# Patient Record
Sex: MALE | Race: WHITE | Employment: FULL TIME | URBAN - METROPOLITAN AREA
[De-identification: names, ages, dates, MRNs, and addresses within clinical notes are randomized per-mention and may not be internally consistent; named-entity substitution may affect disease eponyms.]

---

## 2017-11-24 ENCOUNTER — HOSPITAL ENCOUNTER (EMERGENCY)
Age: 52
Discharge: HOME | End: 2017-11-24
Attending: EMERGENCY MEDICINE
Payer: COMMERCIAL

## 2017-11-24 VITALS
BODY MASS INDEX: 46.65 KG/M2 | WEIGHT: 315 LBS | OXYGEN SATURATION: 97 % | HEIGHT: 69 IN | DIASTOLIC BLOOD PRESSURE: 102 MMHG | HEART RATE: 94 BPM | TEMPERATURE: 98.4 F | RESPIRATION RATE: 14 BRPM | SYSTOLIC BLOOD PRESSURE: 193 MMHG

## 2017-11-24 DIAGNOSIS — M25.472 LEFT ANKLE SWELLING: Primary | ICD-10-CM

## 2017-11-24 PROCEDURE — 99283 EMERGENCY DEPT VISIT LOW MDM: CPT

## 2017-11-24 PROCEDURE — 6370000000 HC RX 637 (ALT 250 FOR IP): Performed by: PHYSICIAN ASSISTANT

## 2017-11-24 RX ORDER — HYDROCODONE BITARTRATE AND ACETAMINOPHEN 5; 325 MG/1; MG/1
2 TABLET ORAL ONCE
Status: COMPLETED | OUTPATIENT
Start: 2017-11-24 | End: 2017-11-24

## 2017-11-24 RX ORDER — HYDROCODONE BITARTRATE AND ACETAMINOPHEN 5; 325 MG/1; MG/1
1 TABLET ORAL EVERY 6 HOURS PRN
Qty: 10 TABLET | Refills: 0 | Status: SHIPPED | OUTPATIENT
Start: 2017-11-24 | End: 2017-11-24

## 2017-11-24 RX ORDER — PREDNISONE 20 MG/1
60 TABLET ORAL ONCE
Status: COMPLETED | OUTPATIENT
Start: 2017-11-24 | End: 2017-11-24

## 2017-11-24 RX ORDER — HYDROCODONE BITARTRATE AND ACETAMINOPHEN 5; 325 MG/1; MG/1
1 TABLET ORAL EVERY 6 HOURS PRN
Qty: 20 TABLET | Refills: 0 | Status: SHIPPED | OUTPATIENT
Start: 2017-11-24 | End: 2017-12-01

## 2017-11-24 RX ORDER — IBUPROFEN 200 MG
200 TABLET ORAL EVERY 6 HOURS PRN
Status: ON HOLD | COMMUNITY
End: 2019-03-11 | Stop reason: HOSPADM

## 2017-11-24 RX ORDER — INDOMETHACIN 50 MG/1
50 CAPSULE ORAL 2 TIMES DAILY WITH MEALS
Qty: 14 CAPSULE | Refills: 0 | Status: SHIPPED | OUTPATIENT
Start: 2017-11-24 | End: 2017-11-24 | Stop reason: CLARIF

## 2017-11-24 RX ADMIN — HYDROCODONE BITARTRATE AND ACETAMINOPHEN 2 TABLET: 5; 325 TABLET ORAL at 12:20

## 2017-11-24 RX ADMIN — PREDNISONE 60 MG: 20 TABLET ORAL at 12:20

## 2017-11-24 ASSESSMENT — PAIN DESCRIPTION - FREQUENCY: FREQUENCY: CONTINUOUS

## 2017-11-24 ASSESSMENT — PAIN DESCRIPTION - LOCATION: LOCATION: FOOT

## 2017-11-24 ASSESSMENT — PAIN DESCRIPTION - ORIENTATION: ORIENTATION: LEFT

## 2017-11-24 ASSESSMENT — PAIN DESCRIPTION - DESCRIPTORS: DESCRIPTORS: ACHING

## 2017-11-24 ASSESSMENT — PAIN SCALES - GENERAL
PAINLEVEL_OUTOF10: 10
PAINLEVEL_OUTOF10: 10

## 2017-11-24 NOTE — ED PROVIDER NOTES
[facility] ED  Emergency Department  Independent Attestation     Pt Name: Mi Barney  MRN: [id number]  [place] 1965  Date of evaluation: 11/24/17       Mi Barney is a 54 y.o. male who presents with No chief complaint on file. I was personally available for consultation in the Emergency Department. I have reviewed the chart and agree with the documentation as recorded by the [facility] AND [facility], including the assessment, treatment plan and disposition.     Gemma Parry DO  Attending Emergency Physician  [facility] ED      (Please note that portions of this note were completed with a voice recognition program.  Efforts were made to edit the dictations but occasionally words are mis-transcribed.)        Gemma Parry DO  11/24/17 1200

## 2017-11-24 NOTE — ED PROVIDER NOTES
[facility]  eMERGENCY dEPARTMENT eNCOUnter      Pt Name: Corina Gomes  MRN: [id number]  [place] 1965  Date of evaluation: 11/24/2017  Provider: Benita Sellers 128 COMPLAINT       Chief Complaint   Patient presents with    Foot Pain           HISTORY OF PRESENT ILLNESS  (Location/Symptom, Timing/Onset, Context/Setting, Quality, Duration, Modifying Factors, Severity.)   Corina Gomes is a 54 y.o. male who presents to the emergency department Complaining of left ankle pain for the past 2 days. Patient has a history of gout feels similar previous. Denies any injury, states that he just woke up with left ankle pain without any injury     Quality: aching  Duration: constant  Modifying Factors: worse with walking, better with rest and elevation  Severity: mild-moderate    Nursing Notes were reviewed. REVIEW OF SYSTEMS    (2-9 systems for level 4, 10 or more for level 5)     Review of Systems   Constitutional: Negative. Musculoskeletal: left ankle pain. Except as noted above the remainder of the review of systems was reviewed and negative.        PAST MEDICAL HISTORY         Diagnosis Date    Red's esophagus 15 years ago     gets scoped every 2-3 years    GERD (gastroesophageal reflux disease)     Gout     Hyperlipidemia     Hypertension     Morbid obesity ([facility] [address]     KVNG (obstructive sleep apnea)      None otherwise stated in nurses note    SURGICAL HISTORY           Procedure Laterality Date    ENDOSCOPY, COLON, DIAGNOSTIC      mulitple EGDs    HERNIA REPAIR  1982    hiatal hernia repair    TONSILLECTOMY       None otherwise stated in nurses note    CURRENT MEDICATIONS       Current Discharge Medication List      CONTINUE these medications which have NOT CHANGED    Details   ibuprofen (ADVIL;MOTRIN) 200 MG tablet Take 200 mg by mouth every 6 hours as needed for Pain      lansoprazole (PREVACID) 30 MG capsule Take 30 mg by mouth daily      hydrALAZINE (APRESOLINE) 25 MG tablet Take 1 tablet by mouth every 8 hours  Qty: 90 tablet, Refills: 0      diphenhydrAMINE (BENADRYL) 25 MG tablet Take 25 mg by mouth every 6 hours as needed for Itching             ALLERGIES     Lisinopril    FAMILY HISTORY           Problem Relation Age of Onset    Cancer Mother     Heart Disease Father     High Blood Pressure Father     Other Brother      parkinsons    Cancer Maternal Uncle     Diabetes Paternal Cousin      Family Status   Relation Status    Mother     Father     Brother     Maternal Uncle     Paternal Cousin       None otherwise stated in nurses note    SOCIAL HISTORY      reports that he has been smoking Cigars. He has never used smokeless tobacco. He reports that he does not drink alcohol or use drugs. Lives at home with others    PHYSICAL EXAM    (up to 7 for level 4, 8 or more for level 5)     ED Triage Vitals [11/24/17 1154]   BP Temp Temp Source Pulse Resp SpO2 Height Weight   (!) 181/97 98.4 °F (36.9 °C) Oral 98 14 97 % 5' 9\" (1.753 m) (!) 360 lb (163.3 kg)       Physical Exam   Nursing note and vitals reviewed. Constitutional: Oriented to person, place, and time and well-developed, well-nourished, and in no distress. Head: Normocephalic and atraumatic. Ear: External ears normal.   Nose: Nose normal and midline. Eyes: Conjunctivae and EOM are normal. Pupils are equal, round, and reactive to light. Cardiovascular: Normal rate, regular rhythm, normal heart sounds and intact distal pulses. Pulmonary/Chest: Effort normal and breath sounds normal. No respiratory distress. No wheezes. No rales. No chest tenderness. Musculoskeletal: Tender palpation lateral left ankle, range of motion is painful, slightly warm to touch. No rashes, no obvious deformity dislocations present. Neurological: Alert and oriented to person, place, and time. GCS score is 15. Skin: Skin is warm and dry. No rash noted. No erythema. No pallor.              DIAGNOSTIC RESULTS     RADIOLOGY:   All plain film, CT, MRI, and formal ultrasound images (except ED bedside ultrasound) are read by the radiologist and the images and interpretations are directly viewed by the emergency physician. No orders to display           No results found. LABS:  Labs Reviewed - No data to display    All other labs were within normal range or not returned as of this dictation. EMERGENCY DEPARTMENT COURSE and DIFFERENTIAL DIAGNOSIS/MDM:   Vitals:    Vitals:    11/24/17 1154 11/24/17 1217 11/24/17 1243   BP: (!) 181/97  (!) 193/102   Pulse: 98 92 94   Resp: 14     Temp: 98.4 °F (36.9 °C)     TempSrc: Oral     SpO2: 97%  97%   Weight: (!) 360 lb (163.3 kg)     Height: 5' 9\" (1.753 m)         Pre-hypertension/Hypertension: The patient has been informed that they may have pre-hypertension or Hypertension based on a blood pressure reading in the emergency department. I recommend that the patient call the primary care provider listed on their discharge instructions or a physician of their choice this week to arrange follow up for further evaluation of possible pre-hypertension or Hypertension    Patient hasn't had his blood pressure medication over a year, instructed him that he needs to see his doctor, will avoid prescribing any NSAIDs    Patient instructed to return to the emergency room if symptoms worsen, return, or any other concern right away which is agreed. MEDS  Orders Placed This Encounter   Medications    predniSONE (DELTASONE) tablet 60 mg    HYDROcodone-acetaminophen (NORCO) 5-325 MG per tablet 2 tablet    DISCONTD: indomethacin (INDOCIN) 50 MG capsule     Sig: Take 1 capsule by mouth 2 times daily (with meals)     Dispense:  14 capsule     Refill:  0    DISCONTD: HYDROcodone-acetaminophen (NORCO) 5-325 MG per tablet     Sig: Take 1 tablet by mouth every 6 hours as needed for Pain .      Dispense:  10 tablet     Refill:  0    HYDROcodone-acetaminophen (NORCO) 5-325 MG per tablet Sig: Take 1 tablet by mouth every 6 hours as needed for Pain . Dispense:  20 tablet     Refill:  0         CONSULTS:  None    PROCEDURES:  None        FINAL IMPRESSION      1. Left ankle swelling          DISPOSITION/PLAN   DISPOSITION Decision To Discharge    PATIENT REFERRED TO:  Mikki Win, [address] [place] [address] [address] [employer] [address] [facility]  [phone]    Schedule an appointment as soon as possible for a visit       [facility] ED  [address]  [address] [place] [address]  [phone]    For worsening symptoms, or any other concern      DISCHARGE MEDICATIONS:  Current Discharge Medication List      START taking these medications    Details   HYDROcodone-acetaminophen (NORCO) 5-325 MG per tablet Take 1 tablet by mouth every 6 hours as needed for Pain .   Qty: 20 tablet, Refills: 0             (Please note that portions of this note were completed with a voice recognition program.  Efforts were made to edit the dictations but occasionally words are mis-transcribed.)    TERE Reddy PA  11/24/17 1300

## 2018-01-04 ENCOUNTER — HOSPITAL ENCOUNTER (INPATIENT)
Age: 53
LOS: 5 days | Discharge: HOME | DRG: 554 | End: 2018-01-09
Attending: INTERNAL MEDICINE | Admitting: INTERNAL MEDICINE
Payer: COMMERCIAL

## 2018-01-04 ENCOUNTER — OFFICE VISIT (OUTPATIENT)
Dept: INTERNAL MEDICINE CLINIC | Age: 53
End: 2018-01-04
Payer: COMMERCIAL

## 2018-01-04 VITALS — RESPIRATION RATE: 18 BRPM | HEIGHT: 69 IN | HEART RATE: 86 BPM

## 2018-01-04 DIAGNOSIS — E66.01 MORBIDLY OBESE (HCC): ICD-10-CM

## 2018-01-04 DIAGNOSIS — M25.572 ACUTE LEFT ANKLE PAIN: Primary | ICD-10-CM

## 2018-01-04 DIAGNOSIS — I10 ESSENTIAL HYPERTENSION: ICD-10-CM

## 2018-01-04 PROBLEM — M00.9 SEPTIC ARTHRITIS OF ANKLE (HCC): Status: ACTIVE | Noted: 2018-01-04

## 2018-01-04 LAB
ABSOLUTE EOS #: 0.3 K/UL (ref 0–0.4)
ABSOLUTE IMMATURE GRANULOCYTE: ABNORMAL K/UL (ref 0–0.3)
ABSOLUTE LYMPH #: 2.4 K/UL (ref 1–4.8)
ABSOLUTE MONO #: 1 K/UL (ref 0.1–1.3)
ANION GAP SERPL CALCULATED.3IONS-SCNC: 12 MMOL/L (ref 9–17)
BASOPHILS # BLD: 1 % (ref 0–2)
BASOPHILS ABSOLUTE: 0.1 K/UL (ref 0–0.2)
BUN BLDV-MCNC: 14 MG/DL (ref 6–20)
BUN/CREAT BLD: ABNORMAL (ref 9–20)
C-REACTIVE PROTEIN: 14.1 MG/L (ref 0–5)
CALCIUM SERPL-MCNC: 9.1 MG/DL (ref 8.6–10.4)
CHLORIDE BLD-SCNC: 101 MMOL/L (ref 98–107)
CO2: 25 MMOL/L (ref 20–31)
CREAT SERPL-MCNC: 1.03 MG/DL (ref 0.7–1.2)
CREAT SERPL-MCNC: 1.05 MG/DL (ref 0.7–1.2)
DIFFERENTIAL TYPE: ABNORMAL
EOSINOPHILS RELATIVE PERCENT: 3 % (ref 0–4)
GFR AFRICAN AMERICAN: >60 ML/MIN
GFR AFRICAN AMERICAN: >60 ML/MIN
GFR NON-AFRICAN AMERICAN: >60 ML/MIN
GFR NON-AFRICAN AMERICAN: >60 ML/MIN
GFR SERPL CREATININE-BSD FRML MDRD: ABNORMAL ML/MIN/{1.73_M2}
GFR SERPL CREATININE-BSD FRML MDRD: ABNORMAL ML/MIN/{1.73_M2}
GFR SERPL CREATININE-BSD FRML MDRD: NORMAL ML/MIN/{1.73_M2}
GFR SERPL CREATININE-BSD FRML MDRD: NORMAL ML/MIN/{1.73_M2}
GLUCOSE BLD-MCNC: 148 MG/DL (ref 70–99)
HCT VFR BLD CALC: 41.1 % (ref 41–53)
HEMOGLOBIN: 13.7 G/DL (ref 13.5–17.5)
IMMATURE GRANULOCYTES: ABNORMAL %
LYMPHOCYTES # BLD: 21 % (ref 24–44)
MCH RBC QN AUTO: 27.8 PG (ref 26–34)
MCHC RBC AUTO-ENTMCNC: 33.5 G/DL (ref 31–37)
MCV RBC AUTO: 83.1 FL (ref 80–100)
MONOCYTES # BLD: 9 % (ref 1–7)
PDW BLD-RTO: 13.2 % (ref 11.5–14.9)
PLATELET # BLD: 248 K/UL (ref 150–450)
PLATELET ESTIMATE: ABNORMAL
PMV BLD AUTO: 8.2 FL (ref 6–12)
POTASSIUM SERPL-SCNC: 4.1 MMOL/L (ref 3.7–5.3)
RBC # BLD: 4.95 M/UL (ref 4.5–5.9)
RBC # BLD: ABNORMAL 10*6/UL
SEDIMENTATION RATE, ERYTHROCYTE: 22 MM (ref 0–15)
SEG NEUTROPHILS: 66 % (ref 36–66)
SEGMENTED NEUTROPHILS ABSOLUTE COUNT: 7.3 K/UL (ref 1.3–9.1)
SODIUM BLD-SCNC: 138 MMOL/L (ref 135–144)
URIC ACID: 6.8 MG/DL (ref 3.4–7)
WBC # BLD: 11.1 K/UL (ref 3.5–11)
WBC # BLD: ABNORMAL 10*3/UL

## 2018-01-04 PROCEDURE — 4004F PT TOBACCO SCREEN RCVD TLK: CPT | Performed by: INTERNAL MEDICINE

## 2018-01-04 PROCEDURE — 99214 OFFICE O/P EST MOD 30 MIN: CPT | Performed by: INTERNAL MEDICINE

## 2018-01-04 PROCEDURE — G8417 CALC BMI ABV UP PARAM F/U: HCPCS | Performed by: INTERNAL MEDICINE

## 2018-01-04 PROCEDURE — 1200000000 HC SEMI PRIVATE

## 2018-01-04 PROCEDURE — 36415 COLL VENOUS BLD VENIPUNCTURE: CPT

## 2018-01-04 PROCEDURE — 2580000003 HC RX 258: Performed by: INTERNAL MEDICINE

## 2018-01-04 PROCEDURE — 2580000003 HC RX 258: Performed by: FAMILY MEDICINE

## 2018-01-04 PROCEDURE — 3017F COLORECTAL CA SCREEN DOC REV: CPT | Performed by: INTERNAL MEDICINE

## 2018-01-04 PROCEDURE — 80048 BASIC METABOLIC PNL TOTAL CA: CPT

## 2018-01-04 PROCEDURE — G8484 FLU IMMUNIZE NO ADMIN: HCPCS | Performed by: INTERNAL MEDICINE

## 2018-01-04 PROCEDURE — 85651 RBC SED RATE NONAUTOMATED: CPT

## 2018-01-04 PROCEDURE — 82565 ASSAY OF CREATININE: CPT

## 2018-01-04 PROCEDURE — 84550 ASSAY OF BLOOD/URIC ACID: CPT

## 2018-01-04 PROCEDURE — 85025 COMPLETE CBC W/AUTO DIFF WBC: CPT

## 2018-01-04 PROCEDURE — 6370000000 HC RX 637 (ALT 250 FOR IP): Performed by: FAMILY MEDICINE

## 2018-01-04 PROCEDURE — 94660 CPAP INITIATION&MGMT: CPT

## 2018-01-04 PROCEDURE — 83036 HEMOGLOBIN GLYCOSYLATED A1C: CPT

## 2018-01-04 PROCEDURE — G8427 DOCREV CUR MEDS BY ELIG CLIN: HCPCS | Performed by: INTERNAL MEDICINE

## 2018-01-04 PROCEDURE — 86140 C-REACTIVE PROTEIN: CPT

## 2018-01-04 PROCEDURE — 6360000002 HC RX W HCPCS: Performed by: FAMILY MEDICINE

## 2018-01-04 PROCEDURE — 6360000002 HC RX W HCPCS: Performed by: INTERNAL MEDICINE

## 2018-01-04 PROCEDURE — 6370000000 HC RX 637 (ALT 250 FOR IP): Performed by: INTERNAL MEDICINE

## 2018-01-04 RX ORDER — LORAZEPAM 2 MG/ML
1 INJECTION INTRAMUSCULAR
Status: ACTIVE | OUTPATIENT
Start: 2018-01-04 | End: 2018-01-04

## 2018-01-04 RX ORDER — IBUPROFEN 400 MG/1
200 TABLET ORAL EVERY 6 HOURS PRN
Status: DISCONTINUED | OUTPATIENT
Start: 2018-01-04 | End: 2018-01-05

## 2018-01-04 RX ORDER — POTASSIUM CHLORIDE 20 MEQ/1
40 TABLET, EXTENDED RELEASE ORAL PRN
Status: DISCONTINUED | OUTPATIENT
Start: 2018-01-04 | End: 2018-01-09 | Stop reason: HOSPADM

## 2018-01-04 RX ORDER — DOCUSATE SODIUM 100 MG/1
100 CAPSULE, LIQUID FILLED ORAL 2 TIMES DAILY
Status: DISCONTINUED | OUTPATIENT
Start: 2018-01-04 | End: 2018-01-09 | Stop reason: HOSPADM

## 2018-01-04 RX ORDER — POTASSIUM CHLORIDE 7.45 MG/ML
10 INJECTION INTRAVENOUS PRN
Status: DISCONTINUED | OUTPATIENT
Start: 2018-01-04 | End: 2018-01-09 | Stop reason: HOSPADM

## 2018-01-04 RX ORDER — ONDANSETRON 2 MG/ML
4 INJECTION INTRAMUSCULAR; INTRAVENOUS EVERY 6 HOURS PRN
Status: DISCONTINUED | OUTPATIENT
Start: 2018-01-04 | End: 2018-01-09 | Stop reason: HOSPADM

## 2018-01-04 RX ORDER — ACETAMINOPHEN 325 MG/1
650 TABLET ORAL EVERY 4 HOURS PRN
Status: DISCONTINUED | OUTPATIENT
Start: 2018-01-04 | End: 2018-01-09 | Stop reason: HOSPADM

## 2018-01-04 RX ORDER — FAMOTIDINE 20 MG/1
20 TABLET, FILM COATED ORAL 2 TIMES DAILY
Status: DISCONTINUED | OUTPATIENT
Start: 2018-01-04 | End: 2018-01-07 | Stop reason: CLARIF

## 2018-01-04 RX ORDER — HYDRALAZINE HYDROCHLORIDE 25 MG/1
25 TABLET, FILM COATED ORAL EVERY 8 HOURS SCHEDULED
Status: DISCONTINUED | OUTPATIENT
Start: 2018-01-04 | End: 2018-01-04 | Stop reason: SDUPTHER

## 2018-01-04 RX ORDER — SODIUM CHLORIDE 0.9 % (FLUSH) 0.9 %
10 SYRINGE (ML) INJECTION PRN
Status: DISCONTINUED | OUTPATIENT
Start: 2018-01-04 | End: 2018-01-09 | Stop reason: HOSPADM

## 2018-01-04 RX ORDER — HYDRALAZINE HYDROCHLORIDE 25 MG/1
25 TABLET, FILM COATED ORAL EVERY 8 HOURS SCHEDULED
Status: DISCONTINUED | OUTPATIENT
Start: 2018-01-04 | End: 2018-01-09 | Stop reason: HOSPADM

## 2018-01-04 RX ORDER — SODIUM CHLORIDE 0.9 % (FLUSH) 0.9 %
10 SYRINGE (ML) INJECTION EVERY 12 HOURS SCHEDULED
Status: DISCONTINUED | OUTPATIENT
Start: 2018-01-04 | End: 2018-01-09 | Stop reason: HOSPADM

## 2018-01-04 RX ORDER — HYDROCODONE BITARTRATE AND ACETAMINOPHEN 5; 325 MG/1; MG/1
1 TABLET ORAL EVERY 6 HOURS PRN
Status: DISCONTINUED | OUTPATIENT
Start: 2018-01-04 | End: 2018-01-06

## 2018-01-04 RX ORDER — POTASSIUM CHLORIDE 20MEQ/15ML
40 LIQUID (ML) ORAL PRN
Status: DISCONTINUED | OUTPATIENT
Start: 2018-01-04 | End: 2018-01-09 | Stop reason: HOSPADM

## 2018-01-04 RX ORDER — OXYCODONE HYDROCHLORIDE AND ACETAMINOPHEN 5; 325 MG/1; MG/1
1 TABLET ORAL EVERY 4 HOURS PRN
Status: DISCONTINUED | OUTPATIENT
Start: 2018-01-04 | End: 2018-01-09 | Stop reason: HOSPADM

## 2018-01-04 RX ADMIN — FAMOTIDINE 20 MG: 20 TABLET ORAL at 20:16

## 2018-01-04 RX ADMIN — OXYCODONE HYDROCHLORIDE AND ACETAMINOPHEN 1 TABLET: 5; 325 TABLET ORAL at 20:15

## 2018-01-04 RX ADMIN — ENOXAPARIN SODIUM 40 MG: 40 INJECTION SUBCUTANEOUS at 20:16

## 2018-01-04 RX ADMIN — VANCOMYCIN HYDROCHLORIDE 1500 MG: 1 INJECTION, POWDER, LYOPHILIZED, FOR SOLUTION INTRAVENOUS at 20:22

## 2018-01-04 RX ADMIN — Medication 10 ML: at 20:16

## 2018-01-04 RX ADMIN — DOCUSATE SODIUM 100 MG: 100 CAPSULE, LIQUID FILLED ORAL at 20:16

## 2018-01-04 RX ADMIN — HYDRALAZINE HYDROCHLORIDE 25 MG: 25 TABLET, FILM COATED ORAL at 18:13

## 2018-01-04 RX ADMIN — HYDROCODONE BITARTRATE AND ACETAMINOPHEN 1 TABLET: 5; 325 TABLET ORAL at 17:44

## 2018-01-04 ASSESSMENT — PAIN SCALES - GENERAL
PAINLEVEL_OUTOF10: 10
PAINLEVEL_OUTOF10: 7
PAINLEVEL_OUTOF10: 10
PAINLEVEL_OUTOF10: 10

## 2018-01-04 ASSESSMENT — PAIN DESCRIPTION - PAIN TYPE
TYPE: ACUTE PAIN
TYPE: ACUTE PAIN

## 2018-01-04 ASSESSMENT — ENCOUNTER SYMPTOMS
EYE PAIN: 0
DIARRHEA: 0
COUGH: 0
WHEEZING: 0
ABDOMINAL DISTENTION: 0
DIARRHEA: 0
COLOR CHANGE: 0
SHORTNESS OF BREATH: 0
COUGH: 0
NAUSEA: 0
EYE DISCHARGE: 0
VOMITING: 0
SHORTNESS OF BREATH: 0
BLOOD IN STOOL: 0
ABDOMINAL PAIN: 0
TROUBLE SWALLOWING: 0
HEARTBURN: 1

## 2018-01-04 ASSESSMENT — PATIENT HEALTH QUESTIONNAIRE - PHQ9
SUM OF ALL RESPONSES TO PHQ QUESTIONS 1-9: 0
2. FEELING DOWN, DEPRESSED OR HOPELESS: 0
1. LITTLE INTEREST OR PLEASURE IN DOING THINGS: 0
SUM OF ALL RESPONSES TO PHQ9 QUESTIONS 1 & 2: 0

## 2018-01-04 ASSESSMENT — PAIN DESCRIPTION - ORIENTATION
ORIENTATION: LEFT
ORIENTATION: LEFT

## 2018-01-04 ASSESSMENT — PAIN DESCRIPTION - LOCATION
LOCATION: FOOT
LOCATION: ANKLE

## 2018-01-04 ASSESSMENT — PAIN DESCRIPTION - DESCRIPTORS: DESCRIPTORS: THROBBING;PINS AND NEEDLES

## 2018-01-04 NOTE — PROGRESS NOTES
ADMISSION NOTE       Patient admitted to room  2037. Time of admit:  [address] from:  Direct admit from Dr. Royce Deras    Reason for admission:  Septic arthritis? Where patient has been residing for the last 24 hrs:  Private residence     Has the patient been admitted to any facility in the last 4 weeks, which one:  no    Family at bedside:  no    Patient is currently resting in bed, vitals obtained, /110, bedside RN notified. no distress noted. Patient has been oriented to room, educated on how to use call light, and to call for assistance prior to getting up. Bed in lowest and locked position. 2 siderails up for safety. Call light within reach.

## 2018-01-04 NOTE — PROGRESS NOTES
for polydipsia and polyphagia. Genitourinary: Negative for difficulty urinating and frequency. Musculoskeletal: Positive for arthralgias. Negative for gait problem, myalgias and neck pain. Skin: Negative for color change and rash. Allergic/Immunologic: Negative for environmental allergies and food allergies. Neurological: Negative for dizziness and headaches. Hematological: Negative for adenopathy. Does not bruise/bleed easily. Psychiatric/Behavioral: Negative for behavioral problems and sleep disturbance. I have personally reviewed and agree with the patient Keri PAIGE Zackary is a 50 y.o. male who presents today for follow up on his chronic medical conditions as noted below. Venetta Plume is c/o of   Chief Complaint   Patient presents with    Ankle Pain     Left ankle pain. NKI to the area. pt did [facility] ER 2-3 weeks ago for this. No meds given.         Patient Active Problem List:     Morbidly obese (HCC)     GERD (gastroesophageal reflux disease)     HLD (hyperlipidemia)     HTN (hypertension)     KVNG (obstructive sleep apnea)     Anaphylactic reaction     Angioedema     Tongue swelling     Past Medical History:   Diagnosis Date    Red's esophagus 15 years ago     gets scoped every 2-3 years    GERD (gastroesophageal reflux disease)     Gout     Hyperlipidemia     Hypertension     Morbid obesity ([address]     KVNG (obstructive sleep apnea)       Past Surgical History:   Procedure Laterality Date    ENDOSCOPY, COLON, DIAGNOSTIC      mulitple EGDs    HERNIA REPAIR  1982    hiatal hernia repair    TONSILLECTOMY       Family History   Problem Relation Age of Onset    Cancer Mother     Heart Disease Father     High Blood Pressure Father     Other Brother      parkinsons    Cancer Maternal Uncle     Diabetes Paternal Cousin      Current Outpatient Prescriptions   Medication Sig Dispense Refill    lansoprazole (PREVACID) 30 MG capsule Take 30 mg by Right eye exhibits normal extraocular motion. Left eye exhibits normal extraocular motion. Neck: Normal range of motion. Neck supple. No JVD present. No edema and no erythema present. No thyroid mass and no thyromegaly present. Cardiovascular: Normal rate and regular rhythm. Exam reveals no friction rub. No murmur heard. Pulmonary/Chest: Effort normal and breath sounds normal. No accessory muscle usage. No tachypnea and no bradypnea. No respiratory distress. He has no wheezes. He has no rales. Abdominal: Soft. Bowel sounds are normal. He exhibits no distension. There is no tenderness. There is no rebound. Musculoskeletal: Normal range of motion. He exhibits no edema or tenderness. Left ankle pain swollen warm  Unable tobear wt on it     Lymphadenopathy:        Head (right side): No submental and no submandibular adenopathy present. Head (left side): No submental and no submandibular adenopathy present. He has no cervical adenopathy. Neurological: He is alert and oriented to person, place, and time. He displays no atrophy. No cranial nerve deficit or sensory deficit. He exhibits normal muscle tone. Coordination normal.   Skin: Skin is warm. No bruising, no ecchymosis and no rash noted. He is not diaphoretic. No pallor. Psychiatric: He has a normal mood and affect. His behavior is normal. His mood appears not anxious. His affect is not angry. His speech is not slurred. He is not aggressive. Cognition and memory are not impaired. He expresses no homicidal ideation. 1. Acute left ankle pain     2. Morbidly obese ([address]     3. Essential hypertension         Assessment / Plan:       No Follow-up on file. No orders of the defined types were placed in this encounter. No orders of the defined types were placed in this encounter.   Ankle pain left sever for two weeks  aggravted with walking  Ana with rest  No fever  No chills    gout vs sepitc ankle    Admit to [facility] for iv abx  Mri

## 2018-01-05 ENCOUNTER — APPOINTMENT (OUTPATIENT)
Dept: MRI IMAGING | Age: 53
DRG: 554 | End: 2018-01-05
Attending: INTERNAL MEDICINE
Payer: COMMERCIAL

## 2018-01-05 ENCOUNTER — APPOINTMENT (OUTPATIENT)
Dept: INTERVENTIONAL RADIOLOGY/VASCULAR | Age: 53
DRG: 554 | End: 2018-01-05
Attending: INTERNAL MEDICINE
Payer: COMMERCIAL

## 2018-01-05 LAB
ABSOLUTE EOS #: 0.4 K/UL (ref 0–0.4)
ABSOLUTE IMMATURE GRANULOCYTE: ABNORMAL K/UL (ref 0–0.3)
ABSOLUTE LYMPH #: 1.9 K/UL (ref 1–4.8)
ABSOLUTE MONO #: 0.7 K/UL (ref 0.1–1.3)
ANION GAP SERPL CALCULATED.3IONS-SCNC: 12 MMOL/L (ref 9–17)
BASOPHILS # BLD: 1 % (ref 0–2)
BASOPHILS ABSOLUTE: 0.1 K/UL (ref 0–0.2)
BUN BLDV-MCNC: 13 MG/DL (ref 6–20)
BUN/CREAT BLD: ABNORMAL (ref 9–20)
CALCIUM SERPL-MCNC: 8.6 MG/DL (ref 8.6–10.4)
CHLORIDE BLD-SCNC: 99 MMOL/L (ref 98–107)
CHOLESTEROL/HDL RATIO: 5.9
CHOLESTEROL: 177 MG/DL
CO2: 26 MMOL/L (ref 20–31)
CREAT SERPL-MCNC: 0.93 MG/DL (ref 0.7–1.2)
CULTURE: NORMAL
DIFFERENTIAL TYPE: ABNORMAL
DIRECT EXAM: NORMAL
EOSINOPHILS RELATIVE PERCENT: 4 % (ref 0–4)
ESTIMATED AVERAGE GLUCOSE: 128 MG/DL
GFR AFRICAN AMERICAN: >60 ML/MIN
GFR NON-AFRICAN AMERICAN: >60 ML/MIN
GFR SERPL CREATININE-BSD FRML MDRD: ABNORMAL ML/MIN/{1.73_M2}
GFR SERPL CREATININE-BSD FRML MDRD: ABNORMAL ML/MIN/{1.73_M2}
GLUCOSE BLD-MCNC: 171 MG/DL (ref 70–99)
HBA1C MFR BLD: 6.1 % (ref 4–6)
HCT VFR BLD CALC: 43 % (ref 41–53)
HDLC SERPL-MCNC: 30 MG/DL
HEMOGLOBIN: 14.3 G/DL (ref 13.5–17.5)
IMMATURE GRANULOCYTES: ABNORMAL %
LDL CHOLESTEROL: 108 MG/DL (ref 0–130)
LYMPHOCYTES # BLD: 23 % (ref 24–44)
Lab: NORMAL
MCH RBC QN AUTO: 28.1 PG (ref 26–34)
MCHC RBC AUTO-ENTMCNC: 33.3 G/DL (ref 31–37)
MCV RBC AUTO: 84.4 FL (ref 80–100)
MONOCYTES # BLD: 8 % (ref 1–7)
PDW BLD-RTO: 13.1 % (ref 11.5–14.9)
PLATELET # BLD: 245 K/UL (ref 150–450)
PLATELET ESTIMATE: ABNORMAL
PMV BLD AUTO: 8.1 FL (ref 6–12)
POTASSIUM SERPL-SCNC: 3.7 MMOL/L (ref 3.7–5.3)
RBC # BLD: 5.09 M/UL (ref 4.5–5.9)
RBC # BLD: ABNORMAL 10*6/UL
SEG NEUTROPHILS: 64 % (ref 36–66)
SEGMENTED NEUTROPHILS ABSOLUTE COUNT: 5.4 K/UL (ref 1.3–9.1)
SODIUM BLD-SCNC: 137 MMOL/L (ref 135–144)
SPECIMEN DESCRIPTION: NORMAL
SPECIMEN DESCRIPTION: NORMAL
STATUS: NORMAL
TRIGL SERPL-MCNC: 193 MG/DL
VANCOMYCIN TROUGH DATE LAST DOSE: NORMAL
VANCOMYCIN TROUGH DOSE AMOUNT: 1500
VANCOMYCIN TROUGH TIME LAST DOSE: 1237
VANCOMYCIN TROUGH: 14.3 UG/ML (ref 10–20)
VLDLC SERPL CALC-MCNC: ABNORMAL MG/DL (ref 1–30)
WBC # BLD: 8.3 K/UL (ref 3.5–11)
WBC # BLD: ABNORMAL 10*3/UL

## 2018-01-05 PROCEDURE — 87075 CULTR BACTERIA EXCEPT BLOOD: CPT

## 2018-01-05 PROCEDURE — 0Y9L30Z DRAINAGE OF LEFT ANKLE REGION WITH DRAINAGE DEVICE, PERCUTANEOUS APPROACH: ICD-10-PCS | Performed by: RADIOLOGY

## 2018-01-05 PROCEDURE — 73723 MRI JOINT LWR EXTR W/O&W/DYE: CPT

## 2018-01-05 PROCEDURE — 6360000002 HC RX W HCPCS: Performed by: FAMILY MEDICINE

## 2018-01-05 PROCEDURE — 36415 COLL VENOUS BLD VENIPUNCTURE: CPT

## 2018-01-05 PROCEDURE — 93971 EXTREMITY STUDY: CPT

## 2018-01-05 PROCEDURE — 6360000002 HC RX W HCPCS: Performed by: INTERNAL MEDICINE

## 2018-01-05 PROCEDURE — 80061 LIPID PANEL: CPT

## 2018-01-05 PROCEDURE — 87070 CULTURE OTHR SPECIMN AEROBIC: CPT

## 2018-01-05 PROCEDURE — 87205 SMEAR GRAM STAIN: CPT

## 2018-01-05 PROCEDURE — 80202 ASSAY OF VANCOMYCIN: CPT

## 2018-01-05 PROCEDURE — 6370000000 HC RX 637 (ALT 250 FOR IP): Performed by: INTERNAL MEDICINE

## 2018-01-05 PROCEDURE — 99254 IP/OBS CNSLTJ NEW/EST MOD 60: CPT | Performed by: INTERNAL MEDICINE

## 2018-01-05 PROCEDURE — 6360000004 HC RX CONTRAST MEDICATION: Performed by: INTERNAL MEDICINE

## 2018-01-05 PROCEDURE — 6370000000 HC RX 637 (ALT 250 FOR IP): Performed by: FAMILY MEDICINE

## 2018-01-05 PROCEDURE — 1200000000 HC SEMI PRIVATE

## 2018-01-05 PROCEDURE — 6360000002 HC RX W HCPCS: Performed by: NURSE PRACTITIONER

## 2018-01-05 PROCEDURE — 89060 EXAM SYNOVIAL FLUID CRYSTALS: CPT

## 2018-01-05 PROCEDURE — A9579 GAD-BASE MR CONTRAST NOS,1ML: HCPCS | Performed by: INTERNAL MEDICINE

## 2018-01-05 PROCEDURE — 87040 BLOOD CULTURE FOR BACTERIA: CPT

## 2018-01-05 PROCEDURE — 2580000003 HC RX 258: Performed by: INTERNAL MEDICINE

## 2018-01-05 PROCEDURE — 94660 CPAP INITIATION&MGMT: CPT

## 2018-01-05 PROCEDURE — 2580000003 HC RX 258: Performed by: FAMILY MEDICINE

## 2018-01-05 PROCEDURE — 77002 NEEDLE LOCALIZATION BY XRAY: CPT | Performed by: RADIOLOGY

## 2018-01-05 PROCEDURE — 10160 PNXR ASPIR ABSC HMTMA BULLA: CPT | Performed by: RADIOLOGY

## 2018-01-05 PROCEDURE — 99223 1ST HOSP IP/OBS HIGH 75: CPT | Performed by: INTERNAL MEDICINE

## 2018-01-05 PROCEDURE — 85025 COMPLETE CBC W/AUTO DIFF WBC: CPT

## 2018-01-05 PROCEDURE — 80048 BASIC METABOLIC PNL TOTAL CA: CPT

## 2018-01-05 RX ORDER — KETOROLAC TROMETHAMINE 30 MG/ML
30 INJECTION, SOLUTION INTRAMUSCULAR; INTRAVENOUS EVERY 6 HOURS PRN
Status: DISCONTINUED | OUTPATIENT
Start: 2018-01-05 | End: 2018-01-09 | Stop reason: HOSPADM

## 2018-01-05 RX ORDER — SODIUM CHLORIDE 0.9 % (FLUSH) 0.9 %
10 SYRINGE (ML) INJECTION PRN
Status: DISCONTINUED | OUTPATIENT
Start: 2018-01-05 | End: 2018-01-09 | Stop reason: HOSPADM

## 2018-01-05 RX ADMIN — VANCOMYCIN HYDROCHLORIDE 1500 MG: 1 INJECTION, POWDER, LYOPHILIZED, FOR SOLUTION INTRAVENOUS at 03:12

## 2018-01-05 RX ADMIN — IOHEXOL 20 ML: 180 INJECTION INTRAVENOUS at 16:58

## 2018-01-05 RX ADMIN — OXYCODONE HYDROCHLORIDE AND ACETAMINOPHEN 1 TABLET: 5; 325 TABLET ORAL at 06:00

## 2018-01-05 RX ADMIN — VANCOMYCIN HYDROCHLORIDE 1500 MG: 1 INJECTION, POWDER, LYOPHILIZED, FOR SOLUTION INTRAVENOUS at 12:37

## 2018-01-05 RX ADMIN — HYDRALAZINE HYDROCHLORIDE 25 MG: 25 TABLET, FILM COATED ORAL at 06:00

## 2018-01-05 RX ADMIN — ENOXAPARIN SODIUM 40 MG: 40 INJECTION SUBCUTANEOUS at 20:38

## 2018-01-05 RX ADMIN — KETOROLAC TROMETHAMINE 30 MG: 30 INJECTION, SOLUTION INTRAMUSCULAR at 20:39

## 2018-01-05 RX ADMIN — KETOROLAC TROMETHAMINE 30 MG: 30 INJECTION, SOLUTION INTRAMUSCULAR at 10:11

## 2018-01-05 RX ADMIN — DOCUSATE SODIUM 100 MG: 100 CAPSULE, LIQUID FILLED ORAL at 20:39

## 2018-01-05 RX ADMIN — VANCOMYCIN HYDROCHLORIDE 1500 MG: 1 INJECTION, POWDER, LYOPHILIZED, FOR SOLUTION INTRAVENOUS at 20:38

## 2018-01-05 RX ADMIN — FAMOTIDINE 20 MG: 20 TABLET ORAL at 20:40

## 2018-01-05 RX ADMIN — KETOROLAC TROMETHAMINE 30 MG: 30 INJECTION, SOLUTION INTRAMUSCULAR at 03:12

## 2018-01-05 RX ADMIN — HYDRALAZINE HYDROCHLORIDE 25 MG: 25 TABLET, FILM COATED ORAL at 23:07

## 2018-01-05 RX ADMIN — HYDRALAZINE HYDROCHLORIDE 25 MG: 25 TABLET, FILM COATED ORAL at 12:37

## 2018-01-05 RX ADMIN — Medication 10 ML: at 08:06

## 2018-01-05 RX ADMIN — HYDROCODONE BITARTRATE AND ACETAMINOPHEN 1 TABLET: 5; 325 TABLET ORAL at 20:40

## 2018-01-05 RX ADMIN — FAMOTIDINE 20 MG: 20 TABLET ORAL at 08:06

## 2018-01-05 RX ADMIN — OXYCODONE HYDROCHLORIDE AND ACETAMINOPHEN 1 TABLET: 5; 325 TABLET ORAL at 00:17

## 2018-01-05 RX ADMIN — OXYCODONE HYDROCHLORIDE AND ACETAMINOPHEN 1 TABLET: 5; 325 TABLET ORAL at 10:11

## 2018-01-05 RX ADMIN — DOCUSATE SODIUM 100 MG: 100 CAPSULE, LIQUID FILLED ORAL at 08:06

## 2018-01-05 RX ADMIN — ENOXAPARIN SODIUM 40 MG: 40 INJECTION SUBCUTANEOUS at 08:06

## 2018-01-05 RX ADMIN — HYDROCODONE BITARTRATE AND ACETAMINOPHEN 1 TABLET: 5; 325 TABLET ORAL at 15:43

## 2018-01-05 RX ADMIN — GADOTERIDOL 20 ML: 279.3 INJECTION, SOLUTION INTRAVENOUS at 12:19

## 2018-01-05 ASSESSMENT — PAIN DESCRIPTION - FREQUENCY
FREQUENCY: CONTINUOUS
FREQUENCY: CONTINUOUS

## 2018-01-05 ASSESSMENT — PAIN DESCRIPTION - LOCATION
LOCATION: FOOT

## 2018-01-05 ASSESSMENT — PAIN DESCRIPTION - DESCRIPTORS
DESCRIPTORS: SHOOTING;THROBBING
DESCRIPTORS: CONSTANT;THROBBING
DESCRIPTORS: CONSTANT;THROBBING

## 2018-01-05 ASSESSMENT — PAIN DESCRIPTION - ONSET
ONSET: ON-GOING
ONSET: ON-GOING

## 2018-01-05 ASSESSMENT — PAIN SCALES - GENERAL
PAINLEVEL_OUTOF10: 7
PAINLEVEL_OUTOF10: 4
PAINLEVEL_OUTOF10: 6
PAINLEVEL_OUTOF10: 7
PAINLEVEL_OUTOF10: 0
PAINLEVEL_OUTOF10: 7
PAINLEVEL_OUTOF10: 7
PAINLEVEL_OUTOF10: 3
PAINLEVEL_OUTOF10: 9
PAINLEVEL_OUTOF10: 7

## 2018-01-05 ASSESSMENT — PAIN DESCRIPTION - ORIENTATION
ORIENTATION: LEFT

## 2018-01-05 ASSESSMENT — PAIN DESCRIPTION - PAIN TYPE
TYPE: ACUTE PAIN

## 2018-01-05 ASSESSMENT — PAIN DESCRIPTION - PROGRESSION
CLINICAL_PROGRESSION: GRADUALLY WORSENING
CLINICAL_PROGRESSION: GRADUALLY IMPROVING

## 2018-01-05 NOTE — PROGRESS NOTES
Pt placed on Cpap 15 at this time. Pulse Ox 100% on Room Air   BS DIM     Skin score 0     Nasal gel pad available at bedside. Pt awake/alert/no distress noted.

## 2018-01-05 NOTE — FLOWSHEET NOTE
SC visit with patient and his parents;     01/05/18 8375   Encounter Summary   Services provided to: Patient and family together   Referral/Consult From: Rounding   Support System Parent   Continue Visiting (1/5/18)   Complexity of Encounter Low   Length of Encounter 15 minutes   Spiritual Assessment Completed Yes   Routine   Type Initial   Assessment Approachable; Hopeful;Coping   Intervention Active listening;Nurtured hope;Prayer;Sustaining presence/ Ministry of presence; Discussed illness/injury and it's impact; Discussed belief system/Anabaptist practices/maninder   Outcome Expressed gratitude;Engaged in conversation;Expressed feelings/needs/concerns;Coping; Hopeful;Receptive

## 2018-01-05 NOTE — PROGRESS NOTES
Pharmacy Note  Vancomycin Consult    Erickson Watts is a 50 y.o. male started on Vancomycin for septic arthritis; consult received from Dr. Sayra Rodriguez MD to manage therapy. Also receiving the following antibiotics: no other antibiotics. Patient Active Problem List   Diagnosis    Morbidly obese (HCC)    GERD (gastroesophageal reflux disease)    HLD (hyperlipidemia)    HTN (hypertension)    KVNG (obstructive sleep apnea)    Anaphylactic reaction    Angioedema    Tongue swelling    Septic arthritis of ankle (HCC)       Allergies:  Lisinopril     Temp max: 37.4    Recent Labs      01/04/18   1737   BUN  14       Recent Labs      01/04/18   1737   CREATININE  1.05  1.03       Recent Labs      01/04/18   1737   WBC  11.1*       No intake or output data in the 24 hours ending 01/04/18 1914    Culture Date      Source                       Results  None ordered    Ht Readings from Last 1 Encounters:   01/04/18 5' 9\" (1.753 m)        Wt Readings from Last 1 Encounters:   01/04/18 (!) 377 lb 3.2 oz (171.1 kg)         Body mass index is 55.7 kg/m². Estimated Creatinine Clearance: 132 mL/min (based on SCr of 1.05 mg/dL). Assessment/Plan:  Will initiate vancomycin 1500 mg IV every 8 hours. Timing of trough level will be determined based on culture results, renal function, and clinical response. Infectious disease consult ordered by physician. Thank you for the consult. Will continue to follow.   Alok Lombardi, [address]  1/4/2018  7:21 PM

## 2018-01-05 NOTE — PLAN OF CARE
Problem: Falls - Risk of  Goal: Absence of falls  Outcome: Ongoing  Pt remains free from falls this shift. Wheels of bed locked, bed in lowest position, bedside table and call light within reach. Will continue to monitor. Assessment remains the same at this time, RN will continue to monitor. Problem: Pain:  Goal: Pain level will decrease  Pain level will decrease   Outcome: Ongoing  Pt medicated per orders, see MAR for details.

## 2018-01-05 NOTE — CARE COORDINATION
CASE MANAGEMENT NOTE:    Admission Date:  1/4/2018 Farooq Tejeda is a 50 y.o.  male    Admitted for : Septic arthritis of ankle ([facility] [address] [M00.9]    Met with:  Patient    PCP:  Dr Linh Roberts:  [facility],       Current Residence/ Living Arrangements:  independently at home  - alone in [facility], 8-10 steps up           Current Services PTA:  No    Is patient agreeable to VNS: No    Freedom of choice provided: Yes         VNS chosen:  No    DME:  Cpap    Home Oxygen: No    Nebulizer: No    Supplier: N/A    Potential Assistance Needed: Yes- may need crutches / walker    SNF needed: No    Pharmacy:  [address] [place] [address] in [place]       Does Patient want to use MEDS to BEDS? No     Family Members/Caregivers that pt would like involved in their care:    No    If yes, list name here:      Transportation Provider:  Patient                      Discharge Plan:  Return home, pt is here for poss septic left ankle                Readmission Risk              Readmission Risk:        5       Age 72 or Greater:  0    Admitted from SNF or Requires Paid or Family Care:  0    Currently has CHF,COPD,ARF,CRI,or is on dialysis:  0    Takes more than 5 Prescription Medications:  0    Takes Digoxin,Insulin,Anticoagulants,Narcotics or ASA/Plavix:  [facility] in Past 12 Months:  0    On Disability:  0    Patient Considers own Health:  5          Electronically signed by:  Dheeraj Crowley RN on 1/5/2018 at 11:00 AM

## 2018-01-06 LAB
ABSOLUTE EOS #: 0.3 K/UL (ref 0–0.4)
ABSOLUTE IMMATURE GRANULOCYTE: ABNORMAL K/UL (ref 0–0.3)
ABSOLUTE LYMPH #: 1.7 K/UL (ref 1–4.8)
ABSOLUTE MONO #: 0.7 K/UL (ref 0.1–1.3)
ANION GAP SERPL CALCULATED.3IONS-SCNC: 11 MMOL/L (ref 9–17)
BASOPHILS # BLD: 1 % (ref 0–2)
BASOPHILS ABSOLUTE: 0.1 K/UL (ref 0–0.2)
BUN BLDV-MCNC: 14 MG/DL (ref 6–20)
BUN/CREAT BLD: ABNORMAL (ref 9–20)
CALCIUM SERPL-MCNC: 9.1 MG/DL (ref 8.6–10.4)
CHLORIDE BLD-SCNC: 100 MMOL/L (ref 98–107)
CO2: 27 MMOL/L (ref 20–31)
CREAT SERPL-MCNC: 0.82 MG/DL (ref 0.7–1.2)
DIFFERENTIAL TYPE: ABNORMAL
EOSINOPHILS RELATIVE PERCENT: 4 % (ref 0–4)
GFR AFRICAN AMERICAN: >60 ML/MIN
GFR NON-AFRICAN AMERICAN: >60 ML/MIN
GFR SERPL CREATININE-BSD FRML MDRD: ABNORMAL ML/MIN/{1.73_M2}
GFR SERPL CREATININE-BSD FRML MDRD: ABNORMAL ML/MIN/{1.73_M2}
GLUCOSE BLD-MCNC: 122 MG/DL (ref 70–99)
HCT VFR BLD CALC: 43.3 % (ref 41–53)
HEMOGLOBIN: 14.7 G/DL (ref 13.5–17.5)
IMMATURE GRANULOCYTES: ABNORMAL %
LYMPHOCYTES # BLD: 21 % (ref 24–44)
MCH RBC QN AUTO: 28.4 PG (ref 26–34)
MCHC RBC AUTO-ENTMCNC: 33.9 G/DL (ref 31–37)
MCV RBC AUTO: 83.9 FL (ref 80–100)
MONOCYTES # BLD: 8 % (ref 1–7)
PDW BLD-RTO: 13.3 % (ref 11.5–14.9)
PLATELET # BLD: 237 K/UL (ref 150–450)
PLATELET ESTIMATE: ABNORMAL
PMV BLD AUTO: 8 FL (ref 6–12)
POTASSIUM SERPL-SCNC: 4.1 MMOL/L (ref 3.7–5.3)
RBC # BLD: 5.16 M/UL (ref 4.5–5.9)
RBC # BLD: ABNORMAL 10*6/UL
SEG NEUTROPHILS: 66 % (ref 36–66)
SEGMENTED NEUTROPHILS ABSOLUTE COUNT: 5.3 K/UL (ref 1.3–9.1)
SODIUM BLD-SCNC: 138 MMOL/L (ref 135–144)
WBC # BLD: 8.1 K/UL (ref 3.5–11)
WBC # BLD: ABNORMAL 10*3/UL

## 2018-01-06 PROCEDURE — 6370000000 HC RX 637 (ALT 250 FOR IP): Performed by: FAMILY MEDICINE

## 2018-01-06 PROCEDURE — 94762 N-INVAS EAR/PLS OXIMTRY CONT: CPT

## 2018-01-06 PROCEDURE — 97530 THERAPEUTIC ACTIVITIES: CPT

## 2018-01-06 PROCEDURE — 36415 COLL VENOUS BLD VENIPUNCTURE: CPT

## 2018-01-06 PROCEDURE — G8980 MOBILITY D/C STATUS: HCPCS

## 2018-01-06 PROCEDURE — G8979 MOBILITY GOAL STATUS: HCPCS

## 2018-01-06 PROCEDURE — 80048 BASIC METABOLIC PNL TOTAL CA: CPT

## 2018-01-06 PROCEDURE — 2580000003 HC RX 258: Performed by: INTERNAL MEDICINE

## 2018-01-06 PROCEDURE — G8978 MOBILITY CURRENT STATUS: HCPCS

## 2018-01-06 PROCEDURE — 2580000003 HC RX 258: Performed by: FAMILY MEDICINE

## 2018-01-06 PROCEDURE — 99232 SBSQ HOSP IP/OBS MODERATE 35: CPT | Performed by: INTERNAL MEDICINE

## 2018-01-06 PROCEDURE — 85025 COMPLETE CBC W/AUTO DIFF WBC: CPT

## 2018-01-06 PROCEDURE — 6360000002 HC RX W HCPCS: Performed by: FAMILY MEDICINE

## 2018-01-06 PROCEDURE — 6370000000 HC RX 637 (ALT 250 FOR IP): Performed by: INTERNAL MEDICINE

## 2018-01-06 PROCEDURE — 94660 CPAP INITIATION&MGMT: CPT

## 2018-01-06 PROCEDURE — 1200000000 HC SEMI PRIVATE

## 2018-01-06 PROCEDURE — 6360000002 HC RX W HCPCS: Performed by: INTERNAL MEDICINE

## 2018-01-06 PROCEDURE — 99233 SBSQ HOSP IP/OBS HIGH 50: CPT | Performed by: INTERNAL MEDICINE

## 2018-01-06 PROCEDURE — 97165 OT EVAL LOW COMPLEX 30 MIN: CPT

## 2018-01-06 PROCEDURE — 97116 GAIT TRAINING THERAPY: CPT

## 2018-01-06 PROCEDURE — 97162 PT EVAL MOD COMPLEX 30 MIN: CPT

## 2018-01-06 RX ORDER — METHYLPREDNISOLONE SODIUM SUCCINATE 125 MG/2ML
80 INJECTION, POWDER, LYOPHILIZED, FOR SOLUTION INTRAMUSCULAR; INTRAVENOUS EVERY 6 HOURS
Status: DISCONTINUED | OUTPATIENT
Start: 2018-01-06 | End: 2018-01-09 | Stop reason: HOSPADM

## 2018-01-06 RX ORDER — COLCHICINE 0.6 MG/1
0.6 TABLET ORAL DAILY
Status: DISCONTINUED | OUTPATIENT
Start: 2018-01-06 | End: 2018-01-09 | Stop reason: HOSPADM

## 2018-01-06 RX ADMIN — HYDRALAZINE HYDROCHLORIDE 25 MG: 25 TABLET, FILM COATED ORAL at 22:14

## 2018-01-06 RX ADMIN — DOCUSATE SODIUM 100 MG: 100 CAPSULE, LIQUID FILLED ORAL at 09:03

## 2018-01-06 RX ADMIN — VANCOMYCIN HYDROCHLORIDE 1500 MG: 1 INJECTION, POWDER, LYOPHILIZED, FOR SOLUTION INTRAVENOUS at 19:39

## 2018-01-06 RX ADMIN — Medication 10 ML: at 19:40

## 2018-01-06 RX ADMIN — DOCUSATE SODIUM 100 MG: 100 CAPSULE, LIQUID FILLED ORAL at 22:14

## 2018-01-06 RX ADMIN — OXYCODONE HYDROCHLORIDE AND ACETAMINOPHEN 1 TABLET: 5; 325 TABLET ORAL at 11:17

## 2018-01-06 RX ADMIN — VANCOMYCIN HYDROCHLORIDE 1500 MG: 1 INJECTION, POWDER, LYOPHILIZED, FOR SOLUTION INTRAVENOUS at 04:17

## 2018-01-06 RX ADMIN — METHYLPREDNISOLONE SODIUM SUCCINATE 80 MG: 125 INJECTION, POWDER, FOR SOLUTION INTRAMUSCULAR; INTRAVENOUS at 19:39

## 2018-01-06 RX ADMIN — ENOXAPARIN SODIUM 40 MG: 40 INJECTION SUBCUTANEOUS at 22:14

## 2018-01-06 RX ADMIN — OXYCODONE HYDROCHLORIDE AND ACETAMINOPHEN 1 TABLET: 5; 325 TABLET ORAL at 15:23

## 2018-01-06 RX ADMIN — HYDRALAZINE HYDROCHLORIDE 25 MG: 25 TABLET, FILM COATED ORAL at 14:30

## 2018-01-06 RX ADMIN — HYDROCODONE BITARTRATE AND ACETAMINOPHEN 1 TABLET: 5; 325 TABLET ORAL at 04:17

## 2018-01-06 RX ADMIN — VANCOMYCIN HYDROCHLORIDE 1500 MG: 1 INJECTION, POWDER, LYOPHILIZED, FOR SOLUTION INTRAVENOUS at 11:51

## 2018-01-06 RX ADMIN — HYDRALAZINE HYDROCHLORIDE 25 MG: 25 TABLET, FILM COATED ORAL at 06:49

## 2018-01-06 RX ADMIN — FAMOTIDINE 20 MG: 20 TABLET ORAL at 22:14

## 2018-01-06 RX ADMIN — Medication 10 ML: at 09:03

## 2018-01-06 RX ADMIN — COLCHICINE 0.6 MG: 0.6 TABLET, FILM COATED ORAL at 15:23

## 2018-01-06 RX ADMIN — ENOXAPARIN SODIUM 40 MG: 40 INJECTION SUBCUTANEOUS at 09:03

## 2018-01-06 RX ADMIN — METHYLPREDNISOLONE SODIUM SUCCINATE 80 MG: 125 INJECTION, POWDER, FOR SOLUTION INTRAMUSCULAR; INTRAVENOUS at 14:31

## 2018-01-06 RX ADMIN — FAMOTIDINE 20 MG: 20 TABLET ORAL at 09:03

## 2018-01-06 RX ADMIN — OXYCODONE HYDROCHLORIDE AND ACETAMINOPHEN 1 TABLET: 5; 325 TABLET ORAL at 19:39

## 2018-01-06 ASSESSMENT — PAIN DESCRIPTION - LOCATION
LOCATION: ANKLE;FOOT
LOCATION: ANKLE

## 2018-01-06 ASSESSMENT — PAIN DESCRIPTION - ONSET
ONSET: ON-GOING

## 2018-01-06 ASSESSMENT — PAIN DESCRIPTION - ORIENTATION
ORIENTATION: LEFT
ORIENTATION: LEFT;OUTER
ORIENTATION: LEFT
ORIENTATION: LEFT

## 2018-01-06 ASSESSMENT — PAIN SCALES - GENERAL
PAINLEVEL_OUTOF10: 8
PAINLEVEL_OUTOF10: 10
PAINLEVEL_OUTOF10: 9
PAINLEVEL_OUTOF10: 4
PAINLEVEL_OUTOF10: 7
PAINLEVEL_OUTOF10: 8
PAINLEVEL_OUTOF10: 8
PAINLEVEL_OUTOF10: 7
PAINLEVEL_OUTOF10: 8
PAINLEVEL_OUTOF10: 7

## 2018-01-06 ASSESSMENT — PAIN DESCRIPTION - FREQUENCY
FREQUENCY: CONTINUOUS

## 2018-01-06 ASSESSMENT — PAIN DESCRIPTION - DESCRIPTORS
DESCRIPTORS: NAGGING;SORE
DESCRIPTORS: ACHING
DESCRIPTORS: SORE;ACHING
DESCRIPTORS: ACHING;SHARP

## 2018-01-06 ASSESSMENT — PAIN DESCRIPTION - PAIN TYPE
TYPE: ACUTE PAIN

## 2018-01-06 ASSESSMENT — PAIN DESCRIPTION - PROGRESSION
CLINICAL_PROGRESSION: GRADUALLY WORSENING
CLINICAL_PROGRESSION: NOT CHANGED
CLINICAL_PROGRESSION: GRADUALLY IMPROVING
CLINICAL_PROGRESSION: NOT CHANGED

## 2018-01-06 NOTE — PLAN OF CARE
Problem: Falls - Risk of  Goal: Absence of falls  Outcome: Met This Shift  Call light in reach, 2 top side rails are up. Bed is locked and in lowest position. Uses call light appropriately. Safety maintained and will continue to monitor. Up per self with steady gait noted. Problem: Infection:  Goal: Will remain free from infection  Will remain free from infection   Outcome: Ongoing  Patient remains afebrile. Redness to top of left foot has decreased somewhat. Remains slightly swollen. No drainage from puncture site. Problem: Pain:  Goal: Pain level will decrease  Pain level will decrease   Outcome: Ongoing  Pain assessed with each interaction. Medication offered as needed. Assisted with/encouraged position of comfort. Pain reassessment done and will continue to monitor. Medicated as able. Patient states that medication helps \"a little bit\".

## 2018-01-06 NOTE — CARE COORDINATION
ONGOING DISCHARGE PLAN:    Spoke with patient regarding discharge plan and patient confirms that plan is still home alone. The patient declines VNS and discharge needs. Patient stated that a cane was affordable to him to purchase over the counter per physical therapy's recommendation. The patient had a left ankle aspiration yesterday and is on IV vanco.     Will continue to follow for additional discharge needs.     Electronically signed by Steph Gaviria RN on 1/6/2018 at 10:31 AM

## 2018-01-06 NOTE — PROGRESS NOTES
Infectious disease Consult Note      Patient: Catalino Trujillo  : 1965  Acct#:  [id number]     Date:  2018    Subjective:       History of Present Illness  Patient is a 50 y.o.  male admitted with Septic arthritis of ankle who is seen in consult for the same . He presented with left ankle swelling ,pain and redness worsening over 2 weeks to the point he was not able to walk , no other joints involvement. Patient denied fever or chills. MRI showed   Moderate ankle joint effusion with mild increased enhancement could indicate   septic arthritis     S/P joint aspiration yesterday . No new complaints . Past Medical History:   Diagnosis Date    Red's esophagus 15 years ago     gets scoped every 2-3 years    GERD (gastroesophageal reflux disease)     Gout     Hyperlipidemia     Hypertension     Morbid obesity ([facility] [address]     KVNG (obstructive sleep apnea)       Past Surgical History:   Procedure Laterality Date    ENDOSCOPY, COLON, DIAGNOSTIC      mulitple EGDs    HERNIA REPAIR      hiatal hernia repair    TONSILLECTOMY            Admission Meds  No current facility-administered medications on file prior to encounter.       Current Outpatient Prescriptions on File Prior to Encounter   Medication Sig Dispense Refill    lansoprazole (PREVACID) 30 MG capsule Take 30 mg by mouth daily      ibuprofen (ADVIL;MOTRIN) 200 MG tablet Take 200 mg by mouth every 6 hours as needed for Pain      hydrALAZINE (APRESOLINE) 25 MG tablet Take 1 tablet by mouth every 8 hours (Patient taking differently: Take 25 mg by mouth every 8 hours Out for about 1 year) 90 tablet 0           Allergies  Allergies   Allergen Reactions    Lisinopril Anaphylaxis        Social   Social History   Substance Use Topics    Smoking status: Current Some Day Smoker     Types: Cigars    Smokeless tobacco: Never Used    Alcohol use Yes      Comment: used to be heavy drinker 15 years ago - occasionally

## 2018-01-06 NOTE — PROGRESS NOTES
Physical Therapy    Facility/Department: [facility] MED SURG  Initial Assessment    NAME: Robin Castellanos  : 1965  MRN: [id number]    Date of Service: 2018    Patient Diagnosis(es): There were no encounter diagnoses. has a past medical history of Red's esophagus; GERD (gastroesophageal reflux disease); Gout; Hyperlipidemia; Hypertension; Morbid obesity ([address] and KVNG (obstructive sleep apnea).   has a past surgical history that includes Tonsillectomy; Endoscopy, colon, diagnostic; and hernia repair (). Pt admitted with L ankle pain and swelling 18, underwent aspiration via IR. Awaiting results. MRI indicating septic arthritis, no fracture. Restrictions  Restrictions/Precautions  Restrictions/Precautions: General Precautions, Fall Risk (no weight bearing restrictions L ankle)  Required Braces or Orthoses?: No  Vision/Hearing  Vision: Within Functional Limits  Hearing: Within functional limits     Subjective  General  Chart Reviewed: Yes  Patient assessed for rehabilitation services?: Yes  Response To Previous Treatment: Not applicable  Family / Caregiver Present: No  Referral Date : 18  Diagnosis: L ankle pain, aspiration  L ankle per IR  Follows Commands: Within Functional Limits  Subjective  Subjective: Pt seen for PT eval with OT, pt up at EOB following, awaiting shower. Ortho consult is pending. Per Emil Newell RN, pt has been OOB and ambulating in room per self  Pain Screening  Patient Currently in Pain: Yes  Pain Assessment  Pain Assessment: 0-10  Pain Level: 8  Pain Type: Acute pain  Pain Location: Ankle  Pain Orientation: Left; Outer  Pain Descriptors: Aching; Sharp  Pain Frequency: Continuous  Pain Onset: On-going  Clinical Progression: Gradually worsening (due to ambulation, increased to 9/10, pain meds not due)  Effect of Pain on Daily Activities: painful mobility  Patient's Stated Pain Goal: No pain  Pain Intervention(s): Cold applied;Elevation;Repositioned  Response to Pain Intervention: None  Vital Signs  Patient Currently in Pain: Yes       Orientation     Pt is alert and oriented x4, mildly impulsivity with mobility. Social/Functional History  Social/Functional History  Lives With: Alone  Type of Home: Apartment  Home Layout: One level  Home Access: Stairs to enter with rails  Entrance Stairs - Number of Steps: 10  Entrance Stairs - Rails:  (unclear, likely 1 rail)  Bathroom Shower/Tub: Tub/Shower unit  Bathroom Toilet: Standard  Bathroom Equipment:  (NO DME)  Bathroom Accessibility: Zee Ignacio accessible (He thinks?)  Home Equipment:  (none)  Receives Help From:  (pt states he has no outside help, he helps his parents)  ADL Assistance: Independent  Homemaking Assistance: Independent  Homemaking Responsibilities: Yes  Ambulation Assistance: Independent  Transfer Assistance: Independent  Active : Yes  Mode of Transportation: Car  Occupation: Full time employment  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 Objective     Observation/Palpation  Observation: pt appears anxious, and in pain. Moves about quickly in room despite his ankle pain and swelling  Edema: L ankle and lower leg 2+    AROM RLE (degrees)  RLE AROM: WNL  AROM LLE (degrees)  LLE AROM : Exceptions  L Ankle Dorsiflexion 0-20: observed moving to  Neutral only, on observation only.  Pt unable to tolerate passive stretch  AROM RUE (degrees)  RUE AROM : WNL  AROM LUE (degrees)  LUE AROM : WNL  Strength RLE  Strength RLE: WNL  Strength LLE  Strength LLE: Exception  L Ankle Dorsiflexion: 3-/5  L Ankle Plantar Flexion: 3-/5  L Ankle Inversion: 3-/5  L Ankle Eversion: 3-/5  Strength RUE  Strength RUE: WNL  Strength LUE  Strength LUE: WNL  Tone RLE  RLE Tone: Normotonic  Tone LLE  LLE Tone: Normotonic  Sensation  Overall Sensation Status: Impaired  Additional Comments: numbness

## 2018-01-07 LAB
ABSOLUTE EOS #: 0 K/UL (ref 0–0.4)
ABSOLUTE IMMATURE GRANULOCYTE: ABNORMAL K/UL (ref 0–0.3)
ABSOLUTE LYMPH #: 0.7 K/UL (ref 1–4.8)
ABSOLUTE MONO #: 0.1 K/UL (ref 0.1–1.3)
ANION GAP SERPL CALCULATED.3IONS-SCNC: 16 MMOL/L (ref 9–17)
BASOPHILS # BLD: 1 % (ref 0–2)
BASOPHILS ABSOLUTE: 0.1 K/UL (ref 0–0.2)
BUN BLDV-MCNC: 12 MG/DL (ref 6–20)
BUN/CREAT BLD: ABNORMAL (ref 9–20)
CALCIUM SERPL-MCNC: 9.5 MG/DL (ref 8.6–10.4)
CHLORIDE BLD-SCNC: 99 MMOL/L (ref 98–107)
CO2: 20 MMOL/L (ref 20–31)
CREAT SERPL-MCNC: 0.79 MG/DL (ref 0.7–1.2)
DIFFERENTIAL TYPE: ABNORMAL
EOSINOPHILS RELATIVE PERCENT: 0 % (ref 0–4)
GFR AFRICAN AMERICAN: >60 ML/MIN
GFR NON-AFRICAN AMERICAN: >60 ML/MIN
GFR SERPL CREATININE-BSD FRML MDRD: ABNORMAL ML/MIN/{1.73_M2}
GFR SERPL CREATININE-BSD FRML MDRD: ABNORMAL ML/MIN/{1.73_M2}
GLUCOSE BLD-MCNC: 202 MG/DL (ref 70–99)
HCT VFR BLD CALC: 46.1 % (ref 41–53)
HEMOGLOBIN: 15.6 G/DL (ref 13.5–17.5)
IMMATURE GRANULOCYTES: ABNORMAL %
LYMPHOCYTES # BLD: 9 % (ref 24–44)
MCH RBC QN AUTO: 28.3 PG (ref 26–34)
MCHC RBC AUTO-ENTMCNC: 33.7 G/DL (ref 31–37)
MCV RBC AUTO: 84 FL (ref 80–100)
MONOCYTES # BLD: 1 % (ref 1–7)
PDW BLD-RTO: 12.8 % (ref 11.5–14.9)
PLATELET # BLD: 307 K/UL (ref 150–450)
PLATELET ESTIMATE: ABNORMAL
PMV BLD AUTO: 8.5 FL (ref 6–12)
POTASSIUM SERPL-SCNC: 4.5 MMOL/L (ref 3.7–5.3)
RBC # BLD: 5.49 M/UL (ref 4.5–5.9)
RBC # BLD: ABNORMAL 10*6/UL
SEG NEUTROPHILS: 89 % (ref 36–66)
SEGMENTED NEUTROPHILS ABSOLUTE COUNT: 7.1 K/UL (ref 1.3–9.1)
SODIUM BLD-SCNC: 135 MMOL/L (ref 135–144)
WBC # BLD: 7.9 K/UL (ref 3.5–11)
WBC # BLD: ABNORMAL 10*3/UL

## 2018-01-07 PROCEDURE — 94660 CPAP INITIATION&MGMT: CPT

## 2018-01-07 PROCEDURE — 97116 GAIT TRAINING THERAPY: CPT

## 2018-01-07 PROCEDURE — 2580000003 HC RX 258: Performed by: INTERNAL MEDICINE

## 2018-01-07 PROCEDURE — 2580000003 HC RX 258: Performed by: FAMILY MEDICINE

## 2018-01-07 PROCEDURE — 85025 COMPLETE CBC W/AUTO DIFF WBC: CPT

## 2018-01-07 PROCEDURE — 94762 N-INVAS EAR/PLS OXIMTRY CONT: CPT

## 2018-01-07 PROCEDURE — 20605 DRAIN/INJ JOINT/BURSA W/O US: CPT | Performed by: PODIATRIST

## 2018-01-07 PROCEDURE — 6370000000 HC RX 637 (ALT 250 FOR IP): Performed by: INTERNAL MEDICINE

## 2018-01-07 PROCEDURE — 99232 SBSQ HOSP IP/OBS MODERATE 35: CPT | Performed by: INTERNAL MEDICINE

## 2018-01-07 PROCEDURE — 6360000002 HC RX W HCPCS: Performed by: INTERNAL MEDICINE

## 2018-01-07 PROCEDURE — 6370000000 HC RX 637 (ALT 250 FOR IP): Performed by: FAMILY MEDICINE

## 2018-01-07 PROCEDURE — 99233 SBSQ HOSP IP/OBS HIGH 50: CPT | Performed by: INTERNAL MEDICINE

## 2018-01-07 PROCEDURE — 99223 1ST HOSP IP/OBS HIGH 75: CPT | Performed by: PODIATRIST

## 2018-01-07 PROCEDURE — 1200000000 HC SEMI PRIVATE

## 2018-01-07 PROCEDURE — 6360000002 HC RX W HCPCS: Performed by: FAMILY MEDICINE

## 2018-01-07 PROCEDURE — 80048 BASIC METABOLIC PNL TOTAL CA: CPT

## 2018-01-07 PROCEDURE — 36415 COLL VENOUS BLD VENIPUNCTURE: CPT

## 2018-01-07 PROCEDURE — 97110 THERAPEUTIC EXERCISES: CPT

## 2018-01-07 RX ORDER — CIPROFLOXACIN 500 MG/1
500 TABLET, FILM COATED ORAL EVERY 12 HOURS SCHEDULED
Status: DISCONTINUED | OUTPATIENT
Start: 2018-01-07 | End: 2018-01-09 | Stop reason: HOSPADM

## 2018-01-07 RX ADMIN — VANCOMYCIN HYDROCHLORIDE 1500 MG: 1 INJECTION, POWDER, LYOPHILIZED, FOR SOLUTION INTRAVENOUS at 12:43

## 2018-01-07 RX ADMIN — OXYCODONE HYDROCHLORIDE AND ACETAMINOPHEN 1 TABLET: 5; 325 TABLET ORAL at 17:05

## 2018-01-07 RX ADMIN — METHYLPREDNISOLONE SODIUM SUCCINATE 80 MG: 125 INJECTION, POWDER, FOR SOLUTION INTRAMUSCULAR; INTRAVENOUS at 08:07

## 2018-01-07 RX ADMIN — HYDRALAZINE HYDROCHLORIDE 25 MG: 25 TABLET, FILM COATED ORAL at 06:37

## 2018-01-07 RX ADMIN — METHYLPREDNISOLONE SODIUM SUCCINATE 80 MG: 125 INJECTION, POWDER, FOR SOLUTION INTRAMUSCULAR; INTRAVENOUS at 21:35

## 2018-01-07 RX ADMIN — COLCHICINE 0.6 MG: 0.6 TABLET, FILM COATED ORAL at 08:07

## 2018-01-07 RX ADMIN — Medication 10 ML: at 21:36

## 2018-01-07 RX ADMIN — ENOXAPARIN SODIUM 40 MG: 40 INJECTION SUBCUTANEOUS at 21:36

## 2018-01-07 RX ADMIN — LANSOPRAZOLE 30 MG: 30 TABLET, ORALLY DISINTEGRATING, DELAYED RELEASE ORAL at 21:36

## 2018-01-07 RX ADMIN — ENOXAPARIN SODIUM 40 MG: 40 INJECTION SUBCUTANEOUS at 08:07

## 2018-01-07 RX ADMIN — HYDRALAZINE HYDROCHLORIDE 25 MG: 25 TABLET, FILM COATED ORAL at 15:45

## 2018-01-07 RX ADMIN — CIPROFLOXACIN HYDROCHLORIDE 500 MG: 500 TABLET, FILM COATED ORAL at 21:35

## 2018-01-07 RX ADMIN — OXYCODONE HYDROCHLORIDE AND ACETAMINOPHEN 1 TABLET: 5; 325 TABLET ORAL at 21:35

## 2018-01-07 RX ADMIN — OXYCODONE HYDROCHLORIDE AND ACETAMINOPHEN 1 TABLET: 5; 325 TABLET ORAL at 12:43

## 2018-01-07 RX ADMIN — VANCOMYCIN HYDROCHLORIDE 1500 MG: 1 INJECTION, POWDER, LYOPHILIZED, FOR SOLUTION INTRAVENOUS at 21:36

## 2018-01-07 RX ADMIN — DOCUSATE SODIUM 100 MG: 100 CAPSULE, LIQUID FILLED ORAL at 08:06

## 2018-01-07 RX ADMIN — METHYLPREDNISOLONE SODIUM SUCCINATE 80 MG: 125 INJECTION, POWDER, FOR SOLUTION INTRAMUSCULAR; INTRAVENOUS at 02:22

## 2018-01-07 RX ADMIN — HYDRALAZINE HYDROCHLORIDE 25 MG: 25 TABLET, FILM COATED ORAL at 21:35

## 2018-01-07 RX ADMIN — DOCUSATE SODIUM 100 MG: 100 CAPSULE, LIQUID FILLED ORAL at 21:35

## 2018-01-07 RX ADMIN — Medication 10 ML: at 08:13

## 2018-01-07 RX ADMIN — FAMOTIDINE 20 MG: 20 TABLET ORAL at 08:06

## 2018-01-07 RX ADMIN — METHYLPREDNISOLONE SODIUM SUCCINATE 80 MG: 125 INJECTION, POWDER, FOR SOLUTION INTRAMUSCULAR; INTRAVENOUS at 14:30

## 2018-01-07 RX ADMIN — OXYCODONE HYDROCHLORIDE AND ACETAMINOPHEN 1 TABLET: 5; 325 TABLET ORAL at 08:06

## 2018-01-07 RX ADMIN — VANCOMYCIN HYDROCHLORIDE 1500 MG: 1 INJECTION, POWDER, LYOPHILIZED, FOR SOLUTION INTRAVENOUS at 06:01

## 2018-01-07 ASSESSMENT — PAIN SCALES - GENERAL
PAINLEVEL_OUTOF10: 0
PAINLEVEL_OUTOF10: 3
PAINLEVEL_OUTOF10: 3
PAINLEVEL_OUTOF10: 6
PAINLEVEL_OUTOF10: 7
PAINLEVEL_OUTOF10: 4
PAINLEVEL_OUTOF10: 6

## 2018-01-07 ASSESSMENT — PAIN DESCRIPTION - LOCATION
LOCATION: ANKLE

## 2018-01-07 ASSESSMENT — PAIN DESCRIPTION - ORIENTATION
ORIENTATION: LEFT

## 2018-01-07 ASSESSMENT — PAIN DESCRIPTION - PAIN TYPE
TYPE: ACUTE PAIN
TYPE: ACUTE PAIN

## 2018-01-07 NOTE — PROGRESS NOTES
Physical Therapy  [address]   Physical Therapy Progress Note    Date: 18  Patient Name: Erickson Watts       Room: 5126/1868-91  MRN: [id number]   Account: [de-identified]   : 1965  (50 y.o.)   Gender: male     Referring Practitioner: Dr. Kingston Ramachandran  Diagnosis: Septic arthritis L ankle; s/p aspiration 18  Restrictions/Precautions: General Precautions, Fall Risk (no weight bearing restrictions L ankle)   Past Medical History:  has a past medical history of Red's esophagus; GERD (gastroesophageal reflux disease); Gout; Hyperlipidemia; Hypertension; Morbid obesity ([facility] [address] and KVNG (obstructive sleep apnea). Past Surgical History:   has a past surgical history that includes Tonsillectomy; Endoscopy, colon, diagnostic; and hernia repair (). Restrictions/Precautions  Restrictions/Precautions: General Precautions; Fall Risk (no weight bearing restrictions L ankle)  Required Braces or Orthoses?: No            Vital Signs  Patient Currently in Pain: Yes  Pain Assessment: 0-10  Pain Level: 6  Pain Location: Ankle  Pain Orientation: Left  Pain Intervention(s): Medication (see eMar)  Response to Pain Intervention: Patient Satisfied         Bed Mobility:   Bed Mobility  Bridging: Independent  Scooting: Independent  Bed mobility  Bridging: Independent  Scooting: Independent    Transfers:  Sit to Stand: Modified independent  Stand to sit: Modified independent  Bed to Chair: Modified independent      Ambulation 1  Surface: level tile  Device:  (IV pole)  Assistance: Stand by assistance  Quality of Gait: wide RICK , 1 large weight shift to right due to right ankle supination  Distance: 200 ft        Stairs/Curb  Stairs?: No (pt deferred reviewed step to sequence)        Posture: Good  Sitting - Static: Good  Sitting - Dynamic: Good  Standing - Static: Fair;+  Standing - Dynamic: Fair;+     Other exercises 1: left ankle AROM DF/PF , inversion/eversion , CW/CCW           Activity Tolerance: Patient limited by pain; Patient Tolerated treatment well;Patient limited by endurance  Activity Tolerance: pt SOB, possibly related to pain and body habitus  PT Equipment Recommendations  Equipment Needed: Yes  Vendor Name: straight cane  Cane: Straight Cane       Assessment  Activity Tolerance: Patient limited by pain; Patient Tolerated treatment well;Patient limited by endurance   Body structures, Functions, Activity limitations: Decreased functional mobility ; Decreased strength;Decreased ROM; Decreased balance  Assessment: pt with L ankle pain and edema, s/p aspiration of the joint. Pt reports new numbness in both feet. Prognosis: Good  Discharge Recommendations: Home independently; Outpatient PT (unless deferred by MD)     Type of devices: All fall risk precautions in place; Patient at risk for falls; Left in bed;Call light within reach;Nurse notified     Plan  Times per week: 5  Times per day: Daily  Current Treatment Recommendations: Strengthening, ROM, Stair training, Gait Training, Balance Training, Functional Mobility Training, Safety Education & Training, Home Exercise Program    Patient Education  New Education Provided: ankle ex, RICE  ( ice pack provided) stair climbing techniques  Learner:patient  Method: explanation       Outcome: demonstrated understanding     Goals  Short term goals  Time Frame for Short term goals: 2-3 days  Short term goal 1: mod ind with straight cane community distances with safe technique  Short term goal 2: independent with HEP     PT Individual Minutes  Time In: (P) 7306  Time Out: (P) 0840  Minutes: (P) 29    Electronically signed by Palak Renner PTA on 1/7/18 at 8:38 AM         01/07/18 0837   PT Individual Minutes   Time In 8744   Time Out 0840   Minutes 29

## 2018-01-08 ENCOUNTER — ANESTHESIA EVENT (OUTPATIENT)
Dept: OPERATING ROOM | Age: 53
DRG: 554 | End: 2018-01-08
Payer: COMMERCIAL

## 2018-01-08 ENCOUNTER — ANESTHESIA (OUTPATIENT)
Dept: OPERATING ROOM | Age: 53
DRG: 554 | End: 2018-01-08
Payer: COMMERCIAL

## 2018-01-08 LAB
ABSOLUTE EOS #: 0 K/UL (ref 0–0.4)
ABSOLUTE IMMATURE GRANULOCYTE: ABNORMAL K/UL (ref 0–0.3)
ABSOLUTE LYMPH #: 1.26 K/UL (ref 1–4.8)
ABSOLUTE MONO #: 0.31 K/UL (ref 0.1–1.3)
ANION GAP SERPL CALCULATED.3IONS-SCNC: 13 MMOL/L (ref 9–17)
BASOPHILS # BLD: 0 % (ref 0–2)
BASOPHILS ABSOLUTE: 0 K/UL (ref 0–0.2)
BUN BLDV-MCNC: 20 MG/DL (ref 6–20)
BUN/CREAT BLD: ABNORMAL (ref 9–20)
CALCIUM SERPL-MCNC: 8.9 MG/DL (ref 8.6–10.4)
CHLORIDE BLD-SCNC: 101 MMOL/L (ref 98–107)
CO2: 22 MMOL/L (ref 20–31)
CREAT SERPL-MCNC: 0.97 MG/DL (ref 0.7–1.2)
CRYSTALS, FLUID: POSITIVE
DIFFERENTIAL TYPE: ABNORMAL
EOSINOPHILS RELATIVE PERCENT: 0 % (ref 0–4)
GFR AFRICAN AMERICAN: >60 ML/MIN
GFR NON-AFRICAN AMERICAN: >60 ML/MIN
GFR SERPL CREATININE-BSD FRML MDRD: ABNORMAL ML/MIN/{1.73_M2}
GFR SERPL CREATININE-BSD FRML MDRD: ABNORMAL ML/MIN/{1.73_M2}
GLUCOSE BLD-MCNC: 202 MG/DL (ref 70–99)
HCT VFR BLD CALC: 41.4 % (ref 41–53)
HEMOGLOBIN: 13.7 G/DL (ref 13.5–17.5)
IMMATURE GRANULOCYTES: ABNORMAL %
LYMPHOCYTES # BLD: 8 % (ref 24–44)
MCH RBC QN AUTO: 28 PG (ref 26–34)
MCHC RBC AUTO-ENTMCNC: 33 G/DL (ref 31–37)
MCV RBC AUTO: 84.8 FL (ref 80–100)
MONOCYTES # BLD: 2 % (ref 1–7)
MORPHOLOGY: NORMAL
PDW BLD-RTO: 13.1 % (ref 11.5–14.9)
PLATELET # BLD: 302 K/UL (ref 150–450)
PLATELET ESTIMATE: ABNORMAL
PMV BLD AUTO: 8.7 FL (ref 6–12)
POTASSIUM SERPL-SCNC: 4.5 MMOL/L (ref 3.7–5.3)
RBC # BLD: 4.89 M/UL (ref 4.5–5.9)
RBC # BLD: ABNORMAL 10*6/UL
SEG NEUTROPHILS: 90 % (ref 36–66)
SEGMENTED NEUTROPHILS ABSOLUTE COUNT: 14.13 K/UL (ref 1.3–9.1)
SODIUM BLD-SCNC: 136 MMOL/L (ref 135–144)
SPECIMEN TYPE: NORMAL
WBC # BLD: 15.7 K/UL (ref 3.5–11)
WBC # BLD: ABNORMAL 10*3/UL

## 2018-01-08 PROCEDURE — 2580000003 HC RX 258: Performed by: INTERNAL MEDICINE

## 2018-01-08 PROCEDURE — 6360000002 HC RX W HCPCS: Performed by: FAMILY MEDICINE

## 2018-01-08 PROCEDURE — 6370000000 HC RX 637 (ALT 250 FOR IP): Performed by: INTERNAL MEDICINE

## 2018-01-08 PROCEDURE — 6370000000 HC RX 637 (ALT 250 FOR IP): Performed by: FAMILY MEDICINE

## 2018-01-08 PROCEDURE — 99232 SBSQ HOSP IP/OBS MODERATE 35: CPT | Performed by: INTERNAL MEDICINE

## 2018-01-08 PROCEDURE — 6370000000 HC RX 637 (ALT 250 FOR IP): Performed by: STUDENT IN AN ORGANIZED HEALTH CARE EDUCATION/TRAINING PROGRAM

## 2018-01-08 PROCEDURE — 94660 CPAP INITIATION&MGMT: CPT

## 2018-01-08 PROCEDURE — 6360000002 HC RX W HCPCS: Performed by: INTERNAL MEDICINE

## 2018-01-08 PROCEDURE — 80048 BASIC METABOLIC PNL TOTAL CA: CPT

## 2018-01-08 PROCEDURE — 2580000003 HC RX 258: Performed by: FAMILY MEDICINE

## 2018-01-08 PROCEDURE — 6360000002 HC RX W HCPCS: Performed by: NURSE PRACTITIONER

## 2018-01-08 PROCEDURE — 85025 COMPLETE CBC W/AUTO DIFF WBC: CPT

## 2018-01-08 PROCEDURE — 94762 N-INVAS EAR/PLS OXIMTRY CONT: CPT

## 2018-01-08 PROCEDURE — 36415 COLL VENOUS BLD VENIPUNCTURE: CPT

## 2018-01-08 PROCEDURE — 1200000000 HC SEMI PRIVATE

## 2018-01-08 PROCEDURE — 99233 SBSQ HOSP IP/OBS HIGH 50: CPT | Performed by: INTERNAL MEDICINE

## 2018-01-08 PROCEDURE — 97116 GAIT TRAINING THERAPY: CPT

## 2018-01-08 RX ORDER — FEBUXOSTAT 40 MG/1
40 TABLET, FILM COATED ORAL DAILY
Status: DISCONTINUED | OUTPATIENT
Start: 2018-01-08 | End: 2018-01-09 | Stop reason: HOSPADM

## 2018-01-08 RX ADMIN — DOCUSATE SODIUM 100 MG: 100 CAPSULE, LIQUID FILLED ORAL at 08:04

## 2018-01-08 RX ADMIN — OXYCODONE HYDROCHLORIDE AND ACETAMINOPHEN 1 TABLET: 5; 325 TABLET ORAL at 03:49

## 2018-01-08 RX ADMIN — CIPROFLOXACIN HYDROCHLORIDE 500 MG: 500 TABLET, FILM COATED ORAL at 21:17

## 2018-01-08 RX ADMIN — LANSOPRAZOLE 30 MG: 30 TABLET, ORALLY DISINTEGRATING, DELAYED RELEASE ORAL at 08:03

## 2018-01-08 RX ADMIN — METHYLPREDNISOLONE SODIUM SUCCINATE 80 MG: 125 INJECTION, POWDER, FOR SOLUTION INTRAMUSCULAR; INTRAVENOUS at 21:18

## 2018-01-08 RX ADMIN — COLCHICINE 0.6 MG: 0.6 TABLET, FILM COATED ORAL at 08:04

## 2018-01-08 RX ADMIN — Medication 10 ML: at 21:36

## 2018-01-08 RX ADMIN — OXYCODONE HYDROCHLORIDE AND ACETAMINOPHEN 1 TABLET: 5; 325 TABLET ORAL at 21:17

## 2018-01-08 RX ADMIN — VANCOMYCIN HYDROCHLORIDE 1500 MG: 1 INJECTION, POWDER, LYOPHILIZED, FOR SOLUTION INTRAVENOUS at 21:31

## 2018-01-08 RX ADMIN — VANCOMYCIN HYDROCHLORIDE 1500 MG: 1 INJECTION, POWDER, LYOPHILIZED, FOR SOLUTION INTRAVENOUS at 04:52

## 2018-01-08 RX ADMIN — FEBUXOSTAT 40 MG: 40 TABLET ORAL at 17:27

## 2018-01-08 RX ADMIN — OXYCODONE HYDROCHLORIDE AND ACETAMINOPHEN 1 TABLET: 5; 325 TABLET ORAL at 08:02

## 2018-01-08 RX ADMIN — DOCUSATE SODIUM 100 MG: 100 CAPSULE, LIQUID FILLED ORAL at 21:17

## 2018-01-08 RX ADMIN — METHYLPREDNISOLONE SODIUM SUCCINATE 80 MG: 125 INJECTION, POWDER, FOR SOLUTION INTRAMUSCULAR; INTRAVENOUS at 03:42

## 2018-01-08 RX ADMIN — ENOXAPARIN SODIUM 40 MG: 40 INJECTION SUBCUTANEOUS at 08:05

## 2018-01-08 RX ADMIN — KETOROLAC TROMETHAMINE 30 MG: 30 INJECTION, SOLUTION INTRAMUSCULAR at 11:55

## 2018-01-08 RX ADMIN — HYDRALAZINE HYDROCHLORIDE 25 MG: 25 TABLET, FILM COATED ORAL at 06:31

## 2018-01-08 RX ADMIN — ENOXAPARIN SODIUM 40 MG: 40 INJECTION SUBCUTANEOUS at 21:17

## 2018-01-08 RX ADMIN — METHYLPREDNISOLONE SODIUM SUCCINATE 80 MG: 125 INJECTION, POWDER, FOR SOLUTION INTRAMUSCULAR; INTRAVENOUS at 10:00

## 2018-01-08 RX ADMIN — VANCOMYCIN HYDROCHLORIDE 1500 MG: 1 INJECTION, POWDER, LYOPHILIZED, FOR SOLUTION INTRAVENOUS at 12:18

## 2018-01-08 RX ADMIN — OXYCODONE HYDROCHLORIDE AND ACETAMINOPHEN 1 TABLET: 5; 325 TABLET ORAL at 17:27

## 2018-01-08 RX ADMIN — HYDRALAZINE HYDROCHLORIDE 25 MG: 25 TABLET, FILM COATED ORAL at 21:17

## 2018-01-08 RX ADMIN — HYDRALAZINE HYDROCHLORIDE 25 MG: 25 TABLET, FILM COATED ORAL at 17:27

## 2018-01-08 RX ADMIN — CIPROFLOXACIN HYDROCHLORIDE 500 MG: 500 TABLET, FILM COATED ORAL at 08:03

## 2018-01-08 ASSESSMENT — PAIN DESCRIPTION - LOCATION: LOCATION: ANKLE

## 2018-01-08 ASSESSMENT — PAIN SCALES - GENERAL
PAINLEVEL_OUTOF10: 6
PAINLEVEL_OUTOF10: 6
PAINLEVEL_OUTOF10: 3
PAINLEVEL_OUTOF10: 3
PAINLEVEL_OUTOF10: 4
PAINLEVEL_OUTOF10: 0
PAINLEVEL_OUTOF10: 6
PAINLEVEL_OUTOF10: 7
PAINLEVEL_OUTOF10: 6
PAINLEVEL_OUTOF10: 6

## 2018-01-08 ASSESSMENT — LIFESTYLE VARIABLES: SMOKING_STATUS: 1

## 2018-01-08 ASSESSMENT — PAIN DESCRIPTION - ORIENTATION: ORIENTATION: LEFT

## 2018-01-08 ASSESSMENT — PAIN - FUNCTIONAL ASSESSMENT: PAIN_FUNCTIONAL_ASSESSMENT: 0-10

## 2018-01-08 ASSESSMENT — PAIN DESCRIPTION - PAIN TYPE: TYPE: ACUTE PAIN

## 2018-01-08 NOTE — PROGRESS NOTES
Podiatry Progress Note. HPI:     Pt seen and examined at bedside. Pt reports swelling has improved to left ankle. Denies N/F/V/C     Review of Systems  All systems reviewed as negative unless otherwise stated in HPI    PHYSICAL EXAM:     Vitals:   Vitals:    01/08/18 0607   BP: (!) 150/95   Pulse: 76   Resp: 16   Temp: 97.3 °F (36.3 °C)   SpO2: 97%     General: AAO x 3 in NAD. Lower Extremity Physical Exam:    Vascular: DP/PT pulses are palpable Bilateral.  CFT <3 seconds to all digits. Global edema present, Left. Erythema to lateral ankle associated with mild increase in calor. Neurological: Protective sensation intact level of digits    Musculoskeletal: Muscle strength 5/5 for all lower extremity muscle groups, Bilateral.  Pain present upon palpation of anterior  Lateral ankle, Left. No POP to posterior talus. No POP to sinus tarsi, left        Dermatologic: Skin is noted to be warm, dry, and supple, Bilateral.  Interdigital maceration noted to 1-4, nadia. No open wounds noted to feet or ankle, nadia. Global non-pitting edema noted to L ankle. Erythema noted to lateral left ankle with mild increase in calor. No fluctuance, crepitus, or induration. Labs:  Lab Results   Component Value Date    WBC 15.7 (H) 01/08/2018    HGB 13.7 01/08/2018    HCT 41.4 01/08/2018     01/08/2018    LYMPHOPCT 8 (L) 01/08/2018    MONOPCT 2 01/08/2018    BASOPCT 0 01/08/2018     Lab Results   Component Value Date     01/08/2018    K 4.5 01/08/2018     01/08/2018    CO2 22 01/08/2018    BUN 20 01/08/2018    CREATININE 0.97 01/08/2018    GLUCOSE 202 (H) 01/08/2018    CALCIUM 8.9 01/08/2018       Imaging:   IR ARTHR/ASP/INJ INT JT/BURSA WO US   Final Result   Successful fluoroscopy guided fluid aspiration left ankle joint with limited   arthrogram, as above.          VL Lower Extremity Venous Left   Final Result      MRI ANKLE LEFT W WO CONTRAST   Final Result   Moderate ankle joint effusion with mild

## 2018-01-08 NOTE — PROGRESS NOTES
Physical Therapy  Facility/Department: [facility] MED SURG  Daily Treatment Note  NAME: Catalino Trujillo  : 1965  MRN: [id number]    Date of Service: 2018    Patient Diagnosis(es):   Patient Active Problem List    Diagnosis Date Noted    Septic arthritis of left ankle ([facility] [address]     Septic arthritis of ankle ([facility] 2018    Anaphylactic reaction 2015    Angioedema     Tongue swelling     Morbidly obese ([facility] 2015    GERD (gastroesophageal reflux disease) 2015    HLD (hyperlipidemia) 2015    HTN (hypertension) 2015    KVNG (obstructive sleep apnea) 2015       Past Medical History:   Diagnosis Date    Red's esophagus 15 years ago     gets scoped every 2-3 years    GERD (gastroesophageal reflux disease)     Gout     Hyperlipidemia     Hypertension     Morbid obesity ([facility]     KVNG (obstructive sleep apnea)      Past Surgical History:   Procedure Laterality Date    ENDOSCOPY, COLON, DIAGNOSTIC      mulitple EGDs    HERNIA REPAIR  1982    hiatal hernia repair    TONSILLECTOMY         Restrictions  Restrictions/Precautions  Restrictions/Precautions: General Precautions, Fall Risk  Required Braces or Orthoses?: No  Subjective   Pain Screening  Patient Currently in Pain: Yes  Pain Assessment  Pain Assessment: 0-10  Pain Level: 3  Pain Type: Acute pain  Pain Location: Ankle  Pain Orientation: Left  Vital Signs  Patient Currently in Pain: Yes       Orientation  Orientation  Overall Orientation Status: Within Functional Limits  Objective      Transfers  Sit to Stand: Modified independent  Stand to sit: Modified independent  Bed to Chair: Modified independent  Ambulation  Ambulation?: Yes  More Ambulation?: No  Ambulation 1  Surface: level tile  Device: No Device  Assistance: Supervision  Quality of Gait: even gait, no loss of balance  Distance: 200 ft, 40 ft  Stairs/Curb  Stairs?: Yes  Stairs  # Steps : 6  Stairs Height: 6\"  Rails: Bilateral  Assistance: Supervision  Comment: tolerated well     Balance  Posture: Good  Sitting - Static: Good  Sitting - Dynamic: Good  Standing - Static: Good  Standing - Dynamic: Fair;+  Other exercises  Other exercises 1: left ankle AROM DF/PF , inversion/eversion , CW/CCW  Other exercises 2: Nustep level 2 x 4 minutes            Assessment   Body structures, Functions, Activity limitations: Decreased functional mobility ; Decreased strength;Decreased ROM; Decreased balance  Assessment: Pt ambulating indep in halls, going for open L ankle arthrotomy later this afternoon, will see after for changes in gait/mobility  REQUIRES PT FOLLOW UP: Yes  Activity Tolerance  Activity Tolerance: Patient Tolerated treatment well  PT Equipment Recommendations  Equipment Needed: No       Discharge Recommendations:  Home independently, Outpatient PT    G-Code    Goals  Short term goals  Time Frame for Short term goals: 2-3 days  Short term goal 1: mod ind with straight cane community distances with safe technique  Short term goal 2: independent with HEP   Patient Goals   Patient goals : return home with minimal ankle pain    Plan    Plan  Times per week: 5  Times per day: Daily  Current Treatment Recommendations: Strengthening, ROM, Stair training, Gait Training, Balance Training, Functional Mobility Training, Safety Education & Training, Home Exercise Program  Safety Devices  Type of devices: Patient at risk for falls, Left in bed, Call light within reach, Nurse notified     Therapy Time   Individual Concurrent Group Co-treatment   Time In 1035         Time Out 1050         Minutes Abi, PT

## 2018-01-08 NOTE — PROGRESS NOTES
Surgery cancelled. Crystal fluid analysis came back positive for uric acid crystals. Recommend continuing Colcrys and starting Uloric.     Electronically signed by Delmar Chaidez DPM on 1/8/2018 at 4:12 PM

## 2018-01-08 NOTE — ANESTHESIA PRE PROCEDURE
last 72 hours. Coags:   Lab Results   Component Value Date    PROTIME 10.3 09/23/2015    INR 1.0 09/23/2015       HCG (If Applicable): No results found for: PREGTESTUR, PREGSERUM, HCG, HCGQUANT     ABGs: No results found for: PHART, PO2ART, KER8FLJ, XVT5GFM, BEART, Q2TZXCIP     Type & Screen (If Applicable):  No results found for: LABABO, [address]    Anesthesia Evaluation  Patient summary reviewed and Nursing notes reviewed no history of anesthetic complications:   Airway: Mallampati: III  TM distance: >3 FB   Neck ROM: full  Mouth opening: > = 3 FB Dental: normal exam         Pulmonary:normal exam  breath sounds clear to auscultation  (+) sleep apnea:  current smoker                           Cardiovascular:    (+) hypertension:, hyperlipidemia        Rhythm: regular  Rate: normal                    Neuro/Psych:   Negative Neuro/Psych ROS              GI/Hepatic/Renal:   (+) GERD:, morbid obesity          Endo/Other: Negative Endo/Other ROS                    Abdominal:   (+) obese,         Vascular: negative vascular ROS. Anesthesia Plan      general     ASA 3     (Patient had Lovenox this morning, will do GA and patient requests GA too.)  Induction: intravenous. MIPS: Postoperative opioids intended and Prophylactic antiemetics administered. Anesthetic plan and risks discussed with patient.                       Flora Morin MD   1/8/2018

## 2018-01-08 NOTE — PROGRESS NOTES
Comment: used to be heavy drinker 15 years ago - occasionally             Family History   Problem Relation Age of Onset    Cancer Mother     Heart Disease Father     High Blood Pressure Father     Other Brother      parkinsons    Cancer Maternal Uncle     Diabetes Paternal Cousin           Review of Systems  No fever / chills / sweats. No oral lesion, sore throat, dysphagia. Denies cough / sputum. Denies chest pain, palpitations. Tolerating antibiotics. Physical Exam  BP (!) 147/82   Pulse 70   Temp 97.7 °F (36.5 °C) (Oral)   Resp 16   Ht 5' 9\" (1.753 m)   Wt (!) 377 lb 3.2 oz (171.1 kg)   SpO2 98%   BMI 55.70 kg/m²           General Appearance: alert and oriented to person, place and time, well-developed and well-nourished, in no acute distress  Skin: warm and dry, no rash   Head: normocephalic and atraumatic  Eyes: pupils equal, round, and reactive to light  ENT: hearing grossly normal bilaterally  Neck: neck supple and non tender    Pulmonary/Chest: clear to auscultation bilaterally- no   Abdomen: soft, non-tender, non-distended. Extremities: no cyanosis, clubbing   Musculoskeletal:  left ankle swelling with decreased range of motion normal range of motion,  joint swelling, and tenderness, mild warmth and erythema better . Neurologic:  muscle strength normal    Data Review:    Recent Labs      01/06/18   0733  01/07/18   0648  01/08/18   0623   WBC  8.1  7.9  15.7*   HGB  14.7  15.6  13.7   HCT  43.3  46.1  41.4   MCV  83.9  84.0  84.8   PLT  237  307  302     Recent Labs      01/06/18   0929  01/07/18   0648  01/08/18   0623   NA  138  135  136   K  4.1  4.5  4.5   CL  100  99  101   CO2  27  20  22   BUN  14  12  20   CREATININE  0.82  0.79  0.97         Imaging Studies:                           All appropriate imaging studies and reports reviewed: Yes                 Assessment:     Left ankle possible septic arthritis vs gout   .   Leukocytosis today ,may be steroids

## 2018-01-08 NOTE — PROGRESS NOTES
[address]    PROGRESS NOTE             Date:   1/8/2018  Patient name:  Franco Kidd  Date of admission:  1/4/2018  3:11 PM  MRN:   [id number]  YOB: 1965    CHIEF COMPLAINT     History Obtained From:  Patient and chart review. HISTORY OF PRESENT ILLNESS      The patient is a 50 y.o.  male who is admitted to the hospital for rt septic ankle   no fever   podiatry seingpost drainage    On colchicine   ? Gout     PAST MEDICAL HISTORY       has a past medical history of Red's esophagus; GERD (gastroesophageal reflux disease); Gout; Hyperlipidemia; Hypertension; Morbid obesity ([facility] [address] and KVNG (obstructive sleep apnea). PAST SURGICAL HISTORY       has a past surgical history that includes Tonsillectomy; Endoscopy, colon, diagnostic; and hernia repair (1982). HOME MEDICATIONS        Prior to Admission medications    Medication Sig Start Date End Date Taking? Authorizing Provider   lansoprazole (PREVACID) 30 MG capsule Take 30 mg by mouth daily   Yes Historical Provider, MD   ibuprofen (ADVIL;MOTRIN) 200 MG tablet Take 200 mg by mouth every 6 hours as needed for Pain    Historical Provider, MD   hydrALAZINE (APRESOLINE) 25 MG tablet Take 1 tablet by mouth every 8 hours  Patient taking differently: Take 25 mg by mouth every 8 hours Out for about 1 year 9/24/15   Yvon Barlow MD       ALLERGIES      Lisinopril    SOCIAL HISTORY       reports that he has been smoking Cigars. He has never used smokeless tobacco. He reports that he drinks alcohol. He reports that he does not use drugs. FAMILY HISTORY      family history includes Cancer in his maternal uncle and mother; Diabetes in his paternal cousin; Heart Disease in his father; High Blood Pressure in his father; Other in his brother.     REVIEW OF SYSTEMS      · Constitutional: Negative for weight loss  · Eyes: Negative for visual changes, diplopia, scleral icterus. · ENT: Negative for Headaches, hearing loss, vertigo, mouth sores, sore throat. · Cardiovascular: Negative for lightheadedness/orthostatic symptoms ,chest pain, dyspnea on exertion, palpitations or loss of consciousness. · Respiratory: Negative for cough or wheezing, sputum production, hemoptysis, pleuritic pain. · Gastrointestinal: Negative for nausea/vomiting, change in bowel habits, abdominal pain, dysphagia/appetite loss, hematemesis, blood in stools. · Genitourinary:Negative for change in bladder habits, dysuria, trouble voiding, hematuria. · Musculoskeletal: Negative for gait disturbance, weakness, joint complaints. · Integumentary: Negative for rash, pruritis. · Neurological: Negative for headache, dizziness, change in muscle strength, numbness/tingling, change in gait, balance, coordination,   · Psychiatric: negative for change in mood, affect, memory, mentation, behavior. · Endocrine: negative for temperature intolerance, excessive thirst, fluid intake, or urination, tremor. · Hematologic/Lymphatic: negative for abnormal bruising or bleeding, blood clots, swollen lymph nodes. · Allergic/Immunologic: negative for nasal congestion, pruritis, hives. PHYSICAL EXAM      BP (!) 147/82   Pulse 70   Temp 97.7 °F (36.5 °C) (Oral)   Resp 16   Ht 5' 9\" (1.753 m)   Wt (!) 377 lb 3.2 oz (171.1 kg)   SpO2 98%   BMI 55.70 kg/m²      · General appearance: well nourished  · HEENT: Head: Normocephalic, no lesions, without obvious abnormality. · Lungs: clear to auscultation bilaterally  · Heart: regular rate and rhythm, S1, S2 normal, no murmur, click, rub or gallop  · Abdomen: soft, non-tender; bowel sounds normal; no masses,  no organomegaly  · Extremities: extremities normal, atraumatic, no cyanosis or edema  · Neurological: Gait normal. Reflexes normal and symmetric.  Sensation grossly normal  · Skin - no rash, no lump   · Eye no icterus no redness  · Psych-normal affect   · NEURO-no limb

## 2018-01-09 VITALS
BODY MASS INDEX: 46.65 KG/M2 | RESPIRATION RATE: 18 BRPM | HEIGHT: 69 IN | HEART RATE: 81 BPM | DIASTOLIC BLOOD PRESSURE: 79 MMHG | WEIGHT: 315 LBS | SYSTOLIC BLOOD PRESSURE: 152 MMHG | OXYGEN SATURATION: 99 % | TEMPERATURE: 97.5 F

## 2018-01-09 LAB
ABSOLUTE EOS #: 0 K/UL (ref 0–0.4)
ABSOLUTE IMMATURE GRANULOCYTE: ABNORMAL K/UL (ref 0–0.3)
ABSOLUTE LYMPH #: 1 K/UL (ref 1–4.8)
ABSOLUTE MONO #: 0.5 K/UL (ref 0.1–1.3)
ANION GAP SERPL CALCULATED.3IONS-SCNC: 14 MMOL/L (ref 9–17)
BASOPHILS # BLD: 0 % (ref 0–2)
BASOPHILS ABSOLUTE: 0 K/UL (ref 0–0.2)
BUN BLDV-MCNC: 30 MG/DL (ref 6–20)
BUN/CREAT BLD: ABNORMAL (ref 9–20)
CALCIUM SERPL-MCNC: 9.1 MG/DL (ref 8.6–10.4)
CHLORIDE BLD-SCNC: 102 MMOL/L (ref 98–107)
CO2: 23 MMOL/L (ref 20–31)
CREAT SERPL-MCNC: 1.2 MG/DL (ref 0.7–1.2)
DIFFERENTIAL TYPE: ABNORMAL
EOSINOPHILS RELATIVE PERCENT: 0 % (ref 0–4)
GFR AFRICAN AMERICAN: >60 ML/MIN
GFR NON-AFRICAN AMERICAN: >60 ML/MIN
GFR SERPL CREATININE-BSD FRML MDRD: ABNORMAL ML/MIN/{1.73_M2}
GFR SERPL CREATININE-BSD FRML MDRD: ABNORMAL ML/MIN/{1.73_M2}
GLUCOSE BLD-MCNC: 222 MG/DL (ref 70–99)
HCT VFR BLD CALC: 42.2 % (ref 41–53)
HEMOGLOBIN: 13.9 G/DL (ref 13.5–17.5)
IMMATURE GRANULOCYTES: ABNORMAL %
LYMPHOCYTES # BLD: 7 % (ref 24–44)
MCH RBC QN AUTO: 28.2 PG (ref 26–34)
MCHC RBC AUTO-ENTMCNC: 32.9 G/DL (ref 31–37)
MCV RBC AUTO: 85.6 FL (ref 80–100)
MONOCYTES # BLD: 4 % (ref 1–7)
PDW BLD-RTO: 13.5 % (ref 11.5–14.9)
PLATELET # BLD: 290 K/UL (ref 150–450)
PLATELET ESTIMATE: ABNORMAL
PMV BLD AUTO: 9 FL (ref 6–12)
POTASSIUM SERPL-SCNC: 4.8 MMOL/L (ref 3.7–5.3)
RBC # BLD: 4.93 M/UL (ref 4.5–5.9)
RBC # BLD: ABNORMAL 10*6/UL
SEG NEUTROPHILS: 89 % (ref 36–66)
SEGMENTED NEUTROPHILS ABSOLUTE COUNT: 11.7 K/UL (ref 1.3–9.1)
SODIUM BLD-SCNC: 139 MMOL/L (ref 135–144)
WBC # BLD: 13.1 K/UL (ref 3.5–11)
WBC # BLD: ABNORMAL 10*3/UL

## 2018-01-09 PROCEDURE — 36415 COLL VENOUS BLD VENIPUNCTURE: CPT

## 2018-01-09 PROCEDURE — 6370000000 HC RX 637 (ALT 250 FOR IP): Performed by: INTERNAL MEDICINE

## 2018-01-09 PROCEDURE — 6360000002 HC RX W HCPCS: Performed by: INTERNAL MEDICINE

## 2018-01-09 PROCEDURE — 80048 BASIC METABOLIC PNL TOTAL CA: CPT

## 2018-01-09 PROCEDURE — 99239 HOSP IP/OBS DSCHRG MGMT >30: CPT | Performed by: INTERNAL MEDICINE

## 2018-01-09 PROCEDURE — 6370000000 HC RX 637 (ALT 250 FOR IP): Performed by: STUDENT IN AN ORGANIZED HEALTH CARE EDUCATION/TRAINING PROGRAM

## 2018-01-09 PROCEDURE — 6370000000 HC RX 637 (ALT 250 FOR IP): Performed by: FAMILY MEDICINE

## 2018-01-09 PROCEDURE — 6360000002 HC RX W HCPCS: Performed by: FAMILY MEDICINE

## 2018-01-09 PROCEDURE — 2580000003 HC RX 258: Performed by: INTERNAL MEDICINE

## 2018-01-09 PROCEDURE — 85025 COMPLETE CBC W/AUTO DIFF WBC: CPT

## 2018-01-09 RX ORDER — METHYLPREDNISOLONE 4 MG/1
TABLET ORAL
Qty: 1 KIT | Refills: 0 | Status: SHIPPED | OUTPATIENT
Start: 2018-01-09 | End: 2018-01-15

## 2018-01-09 RX ORDER — COLCHICINE 0.6 MG/1
0.6 TABLET ORAL DAILY
Qty: 30 TABLET | Refills: 3 | Status: SHIPPED | OUTPATIENT
Start: 2018-01-10 | End: 2019-11-19

## 2018-01-09 RX ORDER — FEBUXOSTAT 40 MG/1
40 TABLET, FILM COATED ORAL DAILY
Qty: 30 TABLET | Refills: 2 | Status: SHIPPED | OUTPATIENT
Start: 2018-01-10 | End: 2019-11-19

## 2018-01-09 RX ORDER — COLCHICINE 0.6 MG/1
0.6 CAPSULE ORAL DAILY
Qty: 30 CAPSULE | Refills: 3 | Status: ON HOLD | OUTPATIENT
Start: 2018-01-09 | End: 2019-03-11 | Stop reason: HOSPADM

## 2018-01-09 RX ADMIN — DOCUSATE SODIUM 100 MG: 100 CAPSULE, LIQUID FILLED ORAL at 10:00

## 2018-01-09 RX ADMIN — METHYLPREDNISOLONE SODIUM SUCCINATE 80 MG: 125 INJECTION, POWDER, FOR SOLUTION INTRAMUSCULAR; INTRAVENOUS at 03:56

## 2018-01-09 RX ADMIN — OXYCODONE HYDROCHLORIDE AND ACETAMINOPHEN 1 TABLET: 5; 325 TABLET ORAL at 10:59

## 2018-01-09 RX ADMIN — ENOXAPARIN SODIUM 40 MG: 40 INJECTION SUBCUTANEOUS at 10:00

## 2018-01-09 RX ADMIN — OXYCODONE HYDROCHLORIDE AND ACETAMINOPHEN 1 TABLET: 5; 325 TABLET ORAL at 06:10

## 2018-01-09 RX ADMIN — LANSOPRAZOLE 30 MG: 30 TABLET, ORALLY DISINTEGRATING, DELAYED RELEASE ORAL at 06:10

## 2018-01-09 RX ADMIN — HYDRALAZINE HYDROCHLORIDE 25 MG: 25 TABLET, FILM COATED ORAL at 06:10

## 2018-01-09 RX ADMIN — FEBUXOSTAT 40 MG: 40 TABLET ORAL at 09:59

## 2018-01-09 RX ADMIN — CIPROFLOXACIN HYDROCHLORIDE 500 MG: 500 TABLET, FILM COATED ORAL at 10:00

## 2018-01-09 RX ADMIN — METHYLPREDNISOLONE SODIUM SUCCINATE 80 MG: 125 INJECTION, POWDER, FOR SOLUTION INTRAMUSCULAR; INTRAVENOUS at 10:00

## 2018-01-09 RX ADMIN — VANCOMYCIN HYDROCHLORIDE 1500 MG: 1 INJECTION, POWDER, LYOPHILIZED, FOR SOLUTION INTRAVENOUS at 03:58

## 2018-01-09 RX ADMIN — COLCHICINE 0.6 MG: 0.6 TABLET, FILM COATED ORAL at 09:59

## 2018-01-09 ASSESSMENT — PAIN SCALES - GENERAL
PAINLEVEL_OUTOF10: 6
PAINLEVEL_OUTOF10: 3
PAINLEVEL_OUTOF10: 4

## 2018-01-09 NOTE — CARE COORDINATION
ONGOING DISCHARGE PLAN:    Spoke with patient regarding discharge plan and patient confirms that plan is still home without needs. Pt did NOT end up having open left ankle arthrotomy yesterday as cultures came back positive for uric acid crystals. Pt on Colchicine and Uloric, and will need to continue as outpatient. Ok to be discharged per podiatry stand point and follow up with Dr. Adelita Herrera in one week. Will continue to follow for additional discharge needs. Electronically signed by Noelle Bar RN on 1/9/2018 at 11:12 AM     Follow up appt made with Dr. Adelita Herrera for 1/17 @ 1130am.  Attempted to make follow up with Dr. Maya Stewart however office was closed.     Electronically signed by Noelle Bar RN on 1/9/2018 at 11:21 AM

## 2018-01-10 ENCOUNTER — TELEPHONE (OUTPATIENT)
Dept: INTERNAL MEDICINE CLINIC | Age: 53
End: 2018-01-10

## 2018-01-10 LAB
CULTURE: ABNORMAL
CULTURE: ABNORMAL
DIRECT EXAM: ABNORMAL
DIRECT EXAM: ABNORMAL
Lab: ABNORMAL
SPECIMEN DESCRIPTION: ABNORMAL
STATUS: ABNORMAL

## 2018-01-12 LAB
CULTURE: NORMAL
CULTURE: NORMAL
Lab: NORMAL
Lab: NORMAL
SPECIMEN DESCRIPTION: NORMAL
SPECIMEN DESCRIPTION: NORMAL
STATUS: NORMAL

## 2018-11-01 ENCOUNTER — TELEPHONE (OUTPATIENT)
Dept: GASTROENTEROLOGY | Age: 53
End: 2018-11-01

## 2019-03-08 ENCOUNTER — HOSPITAL ENCOUNTER (INPATIENT)
Age: 54
LOS: 3 days | Discharge: HOME | DRG: 305 | End: 2019-03-11
Attending: INTERNAL MEDICINE | Admitting: INTERNAL MEDICINE
Payer: COMMERCIAL

## 2019-03-08 ENCOUNTER — APPOINTMENT (OUTPATIENT)
Dept: CT IMAGING | Age: 54
End: 2019-03-08
Payer: COMMERCIAL

## 2019-03-08 ENCOUNTER — HOSPITAL ENCOUNTER (EMERGENCY)
Age: 54
Discharge: ANOTHER HOSPITAL | End: 2019-03-08
Attending: EMERGENCY MEDICINE
Payer: COMMERCIAL

## 2019-03-08 ENCOUNTER — APPOINTMENT (OUTPATIENT)
Dept: GENERAL RADIOLOGY | Age: 54
End: 2019-03-08
Payer: COMMERCIAL

## 2019-03-08 VITALS
SYSTOLIC BLOOD PRESSURE: 132 MMHG | WEIGHT: 315 LBS | OXYGEN SATURATION: 90 % | HEIGHT: 69 IN | HEART RATE: 72 BPM | DIASTOLIC BLOOD PRESSURE: 71 MMHG | BODY MASS INDEX: 46.65 KG/M2 | TEMPERATURE: 98.9 F | RESPIRATION RATE: 16 BRPM

## 2019-03-08 DIAGNOSIS — I16.0 HYPERTENSIVE URGENCY: Primary | ICD-10-CM

## 2019-03-08 DIAGNOSIS — I77.9 AORTIC DISEASE (HCC): ICD-10-CM

## 2019-03-08 DIAGNOSIS — R10.84 GENERALIZED ABDOMINAL PAIN: ICD-10-CM

## 2019-03-08 PROBLEM — I16.1 HYPERTENSIVE EMERGENCY: Status: ACTIVE | Noted: 2019-03-08

## 2019-03-08 LAB
-: ABNORMAL
ALBUMIN SERPL-MCNC: 4.6 G/DL (ref 3.5–5.2)
ALBUMIN/GLOBULIN RATIO: ABNORMAL (ref 1–2.5)
ALP BLD-CCNC: 109 U/L (ref 40–129)
ALT SERPL-CCNC: 36 U/L (ref 5–41)
AMORPHOUS: ABNORMAL
ANION GAP SERPL CALCULATED.3IONS-SCNC: 17 MMOL/L (ref 9–17)
AST SERPL-CCNC: 30 U/L
BACTERIA: ABNORMAL
BILIRUB SERPL-MCNC: 0.86 MG/DL (ref 0.3–1.2)
BILIRUBIN URINE: ABNORMAL
BUN BLDV-MCNC: 19 MG/DL (ref 6–20)
BUN/CREAT BLD: ABNORMAL (ref 9–20)
CALCIUM SERPL-MCNC: 9.7 MG/DL (ref 8.6–10.4)
CASTS UA: ABNORMAL /LPF
CHLORIDE BLD-SCNC: 99 MMOL/L (ref 98–107)
CO2: 22 MMOL/L (ref 20–31)
COLOR: ABNORMAL
COMMENT UA: ABNORMAL
CREAT SERPL-MCNC: 1.17 MG/DL (ref 0.7–1.2)
CRYSTALS, UA: ABNORMAL /HPF
EPITHELIAL CELLS UA: ABNORMAL /HPF
GFR AFRICAN AMERICAN: >60 ML/MIN
GFR NON-AFRICAN AMERICAN: >60 ML/MIN
GFR SERPL CREATININE-BSD FRML MDRD: ABNORMAL ML/MIN/{1.73_M2}
GFR SERPL CREATININE-BSD FRML MDRD: ABNORMAL ML/MIN/{1.73_M2}
GLUCOSE BLD-MCNC: 133 MG/DL (ref 70–99)
GLUCOSE URINE: NEGATIVE
HCT VFR BLD CALC: 50.9 % (ref 41–53)
HEMOGLOBIN: 17.4 G/DL (ref 13.5–17.5)
KETONES, URINE: ABNORMAL
LEUKOCYTE ESTERASE, URINE: NEGATIVE
LIPASE: 21 U/L (ref 13–60)
MAGNESIUM: 2.3 MG/DL (ref 1.6–2.6)
MCH RBC QN AUTO: 27.9 PG (ref 26–34)
MCHC RBC AUTO-ENTMCNC: 34.2 G/DL (ref 31–37)
MCV RBC AUTO: 81.6 FL (ref 80–100)
MUCUS: ABNORMAL
NITRITE, URINE: NEGATIVE
NRBC AUTOMATED: ABNORMAL PER 100 WBC
OTHER OBSERVATIONS UA: ABNORMAL
PDW BLD-RTO: 13.9 % (ref 11.5–14.9)
PH UA: 5.5 (ref 5–8)
PLATELET # BLD: 254 K/UL (ref 150–450)
PMV BLD AUTO: 8.6 FL (ref 6–12)
POTASSIUM SERPL-SCNC: 3.5 MMOL/L (ref 3.7–5.3)
PROTEIN UA: ABNORMAL
RBC # BLD: 6.24 M/UL (ref 4.5–5.9)
RBC UA: ABNORMAL /HPF
RENAL EPITHELIAL, UA: ABNORMAL /HPF
SODIUM BLD-SCNC: 138 MMOL/L (ref 135–144)
SPECIFIC GRAVITY UA: 1.08 (ref 1–1.03)
TOTAL PROTEIN: 7.8 G/DL (ref 6.4–8.3)
TRICHOMONAS: ABNORMAL
TROPONIN INTERP: NORMAL
TROPONIN INTERP: NORMAL
TROPONIN T: NORMAL NG/ML
TROPONIN T: NORMAL NG/ML
TROPONIN, HIGH SENSITIVITY: 10 NG/L (ref 0–22)
TROPONIN, HIGH SENSITIVITY: 10 NG/L (ref 0–22)
TURBIDITY: CLEAR
URINE HGB: NEGATIVE
UROBILINOGEN, URINE: NORMAL
WBC # BLD: 11.6 K/UL (ref 3.5–11)
WBC UA: ABNORMAL /HPF
YEAST: ABNORMAL

## 2019-03-08 PROCEDURE — 71275 CT ANGIOGRAPHY CHEST: CPT

## 2019-03-08 PROCEDURE — 96365 THER/PROPH/DIAG IV INF INIT: CPT

## 2019-03-08 PROCEDURE — 84484 ASSAY OF TROPONIN QUANT: CPT

## 2019-03-08 PROCEDURE — 96375 TX/PRO/DX INJ NEW DRUG ADDON: CPT

## 2019-03-08 PROCEDURE — 83735 ASSAY OF MAGNESIUM: CPT

## 2019-03-08 PROCEDURE — 2060000000 HC ICU INTERMEDIATE R&B

## 2019-03-08 PROCEDURE — 96376 TX/PRO/DX INJ SAME DRUG ADON: CPT

## 2019-03-08 PROCEDURE — 74174 CTA ABD&PLVS W/CONTRAST: CPT

## 2019-03-08 PROCEDURE — 80053 COMPREHEN METABOLIC PANEL: CPT

## 2019-03-08 PROCEDURE — 2580000003 HC RX 258: Performed by: EMERGENCY MEDICINE

## 2019-03-08 PROCEDURE — 6360000004 HC RX CONTRAST MEDICATION: Performed by: EMERGENCY MEDICINE

## 2019-03-08 PROCEDURE — 71045 X-RAY EXAM CHEST 1 VIEW: CPT

## 2019-03-08 PROCEDURE — 99285 EMERGENCY DEPT VISIT HI MDM: CPT

## 2019-03-08 PROCEDURE — 6360000002 HC RX W HCPCS: Performed by: EMERGENCY MEDICINE

## 2019-03-08 PROCEDURE — 83690 ASSAY OF LIPASE: CPT

## 2019-03-08 PROCEDURE — 85027 COMPLETE CBC AUTOMATED: CPT

## 2019-03-08 PROCEDURE — 6370000000 HC RX 637 (ALT 250 FOR IP): Performed by: EMERGENCY MEDICINE

## 2019-03-08 PROCEDURE — 81001 URINALYSIS AUTO W/SCOPE: CPT

## 2019-03-08 PROCEDURE — 96366 THER/PROPH/DIAG IV INF ADDON: CPT

## 2019-03-08 PROCEDURE — 2500000003 HC RX 250 WO HCPCS: Performed by: EMERGENCY MEDICINE

## 2019-03-08 PROCEDURE — 93005 ELECTROCARDIOGRAM TRACING: CPT

## 2019-03-08 PROCEDURE — 36415 COLL VENOUS BLD VENIPUNCTURE: CPT

## 2019-03-08 RX ORDER — AMLODIPINE BESYLATE 5 MG/1
5 TABLET ORAL DAILY
Status: DISCONTINUED | OUTPATIENT
Start: 2019-03-09 | End: 2019-03-10

## 2019-03-08 RX ORDER — SODIUM CHLORIDE 0.9 % (FLUSH) 0.9 %
10 SYRINGE (ML) INJECTION EVERY 12 HOURS SCHEDULED
Status: DISCONTINUED | OUTPATIENT
Start: 2019-03-09 | End: 2019-03-11 | Stop reason: HOSPADM

## 2019-03-08 RX ORDER — 0.9 % SODIUM CHLORIDE 0.9 %
80 INTRAVENOUS SOLUTION INTRAVENOUS ONCE
Status: COMPLETED | OUTPATIENT
Start: 2019-03-08 | End: 2019-03-08

## 2019-03-08 RX ORDER — MORPHINE SULFATE 2 MG/ML
2 INJECTION, SOLUTION INTRAMUSCULAR; INTRAVENOUS
Status: DISCONTINUED | OUTPATIENT
Start: 2019-03-08 | End: 2019-03-09

## 2019-03-08 RX ORDER — FEBUXOSTAT 40 MG/1
40 TABLET, FILM COATED ORAL DAILY
Status: DISCONTINUED | OUTPATIENT
Start: 2019-03-09 | End: 2019-03-11 | Stop reason: HOSPADM

## 2019-03-08 RX ORDER — ONDANSETRON 2 MG/ML
4 INJECTION INTRAMUSCULAR; INTRAVENOUS ONCE
Status: COMPLETED | OUTPATIENT
Start: 2019-03-08 | End: 2019-03-08

## 2019-03-08 RX ORDER — SODIUM CHLORIDE 0.9 % (FLUSH) 0.9 %
10 SYRINGE (ML) INJECTION PRN
Status: DISCONTINUED | OUTPATIENT
Start: 2019-03-08 | End: 2019-03-08 | Stop reason: HOSPADM

## 2019-03-08 RX ORDER — PANTOPRAZOLE SODIUM 40 MG/1
40 TABLET, DELAYED RELEASE ORAL
Status: DISCONTINUED | OUTPATIENT
Start: 2019-03-09 | End: 2019-03-11 | Stop reason: HOSPADM

## 2019-03-08 RX ORDER — ASPIRIN 81 MG/1
324 TABLET, CHEWABLE ORAL ONCE
Status: COMPLETED | OUTPATIENT
Start: 2019-03-08 | End: 2019-03-08

## 2019-03-08 RX ORDER — LABETALOL HYDROCHLORIDE 5 MG/ML
20 INJECTION, SOLUTION INTRAVENOUS ONCE
Status: COMPLETED | OUTPATIENT
Start: 2019-03-08 | End: 2019-03-08

## 2019-03-08 RX ORDER — ONDANSETRON 2 MG/ML
4 INJECTION INTRAMUSCULAR; INTRAVENOUS EVERY 6 HOURS PRN
Status: DISCONTINUED | OUTPATIENT
Start: 2019-03-08 | End: 2019-03-11 | Stop reason: HOSPADM

## 2019-03-08 RX ORDER — MORPHINE SULFATE 4 MG/ML
4 INJECTION, SOLUTION INTRAMUSCULAR; INTRAVENOUS ONCE
Status: COMPLETED | OUTPATIENT
Start: 2019-03-08 | End: 2019-03-08

## 2019-03-08 RX ORDER — COLCHICINE 0.6 MG/1
0.6 TABLET ORAL DAILY
Status: DISCONTINUED | OUTPATIENT
Start: 2019-03-09 | End: 2019-03-11 | Stop reason: HOSPADM

## 2019-03-08 RX ORDER — LABETALOL HYDROCHLORIDE 5 MG/ML
20 INJECTION, SOLUTION INTRAVENOUS ONCE
Status: DISCONTINUED | OUTPATIENT
Start: 2019-03-08 | End: 2019-03-08 | Stop reason: HOSPADM

## 2019-03-08 RX ORDER — SODIUM CHLORIDE 0.9 % (FLUSH) 0.9 %
10 SYRINGE (ML) INJECTION PRN
Status: DISCONTINUED | OUTPATIENT
Start: 2019-03-08 | End: 2019-03-11 | Stop reason: HOSPADM

## 2019-03-08 RX ADMIN — NICARDIPINE HYDROCHLORIDE 5 MG/HR: 0.2 INJECTION, SOLUTION INTRAVENOUS at 17:24

## 2019-03-08 RX ADMIN — IOVERSOL 100 ML: 741 INJECTION INTRA-ARTERIAL; INTRAVENOUS at 13:48

## 2019-03-08 RX ADMIN — SODIUM CHLORIDE 80 ML: 9 INJECTION, SOLUTION INTRAVENOUS at 13:48

## 2019-03-08 RX ADMIN — ONDANSETRON 4 MG: 2 INJECTION INTRAMUSCULAR; INTRAVENOUS at 18:05

## 2019-03-08 RX ADMIN — LABETALOL 20 MG/4 ML (5 MG/ML) INTRAVENOUS SYRINGE 20 MG: at 16:29

## 2019-03-08 RX ADMIN — Medication 10 ML: at 13:49

## 2019-03-08 RX ADMIN — HYDROMORPHONE HYDROCHLORIDE 1 MG: 1 INJECTION, SOLUTION INTRAMUSCULAR; INTRAVENOUS; SUBCUTANEOUS at 18:05

## 2019-03-08 RX ADMIN — MORPHINE SULFATE 4 MG: 4 INJECTION INTRAVENOUS at 13:19

## 2019-03-08 RX ADMIN — ASPIRIN 81 MG 324 MG: 81 TABLET ORAL at 12:44

## 2019-03-08 RX ADMIN — HYDROMORPHONE HYDROCHLORIDE 0.5 MG: 1 INJECTION, SOLUTION INTRAMUSCULAR; INTRAVENOUS; SUBCUTANEOUS at 20:59

## 2019-03-08 RX ADMIN — MORPHINE SULFATE 4 MG: 4 INJECTION INTRAVENOUS at 15:20

## 2019-03-08 RX ADMIN — ONDANSETRON 4 MG: 2 INJECTION INTRAMUSCULAR; INTRAVENOUS at 13:53

## 2019-03-08 RX ADMIN — LABETALOL 20 MG/4 ML (5 MG/ML) INTRAVENOUS SYRINGE 20 MG: at 15:10

## 2019-03-08 ASSESSMENT — ENCOUNTER SYMPTOMS
BACK PAIN: 0
VOMITING: 0
CHEST TIGHTNESS: 1
SHORTNESS OF BREATH: 1
DIARRHEA: 0
NAUSEA: 1
ABDOMINAL PAIN: 1

## 2019-03-08 ASSESSMENT — PAIN SCALES - GENERAL
PAINLEVEL_OUTOF10: 8
PAINLEVEL_OUTOF10: 1

## 2019-03-08 ASSESSMENT — PAIN DESCRIPTION - LOCATION
LOCATION: ABDOMEN
LOCATION: ABDOMEN
LOCATION: CHEST;ABDOMEN

## 2019-03-08 ASSESSMENT — PAIN DESCRIPTION - FREQUENCY: FREQUENCY: CONTINUOUS

## 2019-03-08 ASSESSMENT — PAIN DESCRIPTION - PAIN TYPE
TYPE: ACUTE PAIN
TYPE: ACUTE PAIN

## 2019-03-08 ASSESSMENT — PAIN DESCRIPTION - DESCRIPTORS
DESCRIPTORS: SHOOTING;SPASM
DESCRIPTORS: SHOOTING

## 2019-03-08 ASSESSMENT — PAIN DESCRIPTION - ONSET: ONSET: ON-GOING

## 2019-03-08 ASSESSMENT — PAIN DESCRIPTION - ORIENTATION: ORIENTATION: MID

## 2019-03-09 LAB
ANION GAP SERPL CALCULATED.3IONS-SCNC: 17 MMOL/L (ref 9–17)
BUN BLDV-MCNC: 19 MG/DL (ref 6–20)
BUN/CREAT BLD: ABNORMAL (ref 9–20)
CALCIUM SERPL-MCNC: 9.3 MG/DL (ref 8.6–10.4)
CHLORIDE BLD-SCNC: 102 MMOL/L (ref 98–107)
CO2: 22 MMOL/L (ref 20–31)
CREAT SERPL-MCNC: 1.29 MG/DL (ref 0.7–1.2)
GFR AFRICAN AMERICAN: >60 ML/MIN
GFR NON-AFRICAN AMERICAN: 59 ML/MIN
GFR SERPL CREATININE-BSD FRML MDRD: ABNORMAL ML/MIN/{1.73_M2}
GFR SERPL CREATININE-BSD FRML MDRD: ABNORMAL ML/MIN/{1.73_M2}
GLUCOSE BLD-MCNC: 127 MG/DL (ref 70–99)
HCT VFR BLD CALC: 45.1 % (ref 40.7–50.3)
HEMOGLOBIN: 15.1 G/DL (ref 13–17)
LACTIC ACID, WHOLE BLOOD: 1.9 MMOL/L (ref 0.7–2.1)
LACTIC ACID, WHOLE BLOOD: 3.4 MMOL/L (ref 0.7–2.1)
LACTIC ACID, WHOLE BLOOD: 5.4 MMOL/L (ref 0.7–2.1)
MAGNESIUM: 2.5 MG/DL (ref 1.6–2.6)
MCH RBC QN AUTO: 28.6 PG (ref 25.2–33.5)
MCHC RBC AUTO-ENTMCNC: 33.5 G/DL (ref 28.4–34.8)
MCV RBC AUTO: 85.4 FL (ref 82.6–102.9)
NRBC AUTOMATED: 0 PER 100 WBC
PDW BLD-RTO: 13.4 % (ref 11.8–14.4)
PLATELET # BLD: 230 K/UL (ref 138–453)
PMV BLD AUTO: 10.4 FL (ref 8.1–13.5)
POTASSIUM SERPL-SCNC: 3.5 MMOL/L (ref 3.7–5.3)
RBC # BLD: 5.28 M/UL (ref 4.21–5.77)
SODIUM BLD-SCNC: 141 MMOL/L (ref 135–144)
TROPONIN INTERP: NORMAL
TROPONIN T: NORMAL NG/ML
TROPONIN, HIGH SENSITIVITY: 10 NG/L (ref 0–22)
WBC # BLD: 10.7 K/UL (ref 3.5–11.3)

## 2019-03-09 PROCEDURE — 99254 IP/OBS CNSLTJ NEW/EST MOD 60: CPT | Performed by: INTERNAL MEDICINE

## 2019-03-09 PROCEDURE — 83735 ASSAY OF MAGNESIUM: CPT

## 2019-03-09 PROCEDURE — 93005 ELECTROCARDIOGRAM TRACING: CPT

## 2019-03-09 PROCEDURE — 80307 DRUG TEST PRSMV CHEM ANLYZR: CPT

## 2019-03-09 PROCEDURE — 94660 CPAP INITIATION&MGMT: CPT

## 2019-03-09 PROCEDURE — 6360000002 HC RX W HCPCS: Performed by: INTERNAL MEDICINE

## 2019-03-09 PROCEDURE — APPNB15 APP NON BILLABLE TIME 0-15 MINS: Performed by: INTERNAL MEDICINE

## 2019-03-09 PROCEDURE — 6370000000 HC RX 637 (ALT 250 FOR IP): Performed by: STUDENT IN AN ORGANIZED HEALTH CARE EDUCATION/TRAINING PROGRAM

## 2019-03-09 PROCEDURE — 2060000000 HC ICU INTERMEDIATE R&B

## 2019-03-09 PROCEDURE — 2580000003 HC RX 258: Performed by: INTERNAL MEDICINE

## 2019-03-09 PROCEDURE — 83605 ASSAY OF LACTIC ACID: CPT

## 2019-03-09 PROCEDURE — 36415 COLL VENOUS BLD VENIPUNCTURE: CPT

## 2019-03-09 PROCEDURE — 6370000000 HC RX 637 (ALT 250 FOR IP): Performed by: INTERNAL MEDICINE

## 2019-03-09 PROCEDURE — 94762 N-INVAS EAR/PLS OXIMTRY CONT: CPT

## 2019-03-09 PROCEDURE — 83036 HEMOGLOBIN GLYCOSYLATED A1C: CPT

## 2019-03-09 PROCEDURE — 6360000002 HC RX W HCPCS: Performed by: NURSE PRACTITIONER

## 2019-03-09 PROCEDURE — 80048 BASIC METABOLIC PNL TOTAL CA: CPT

## 2019-03-09 PROCEDURE — 84484 ASSAY OF TROPONIN QUANT: CPT

## 2019-03-09 PROCEDURE — 6370000000 HC RX 637 (ALT 250 FOR IP): Performed by: NURSE PRACTITIONER

## 2019-03-09 PROCEDURE — 99222 1ST HOSP IP/OBS MODERATE 55: CPT | Performed by: INTERNAL MEDICINE

## 2019-03-09 PROCEDURE — 85027 COMPLETE CBC AUTOMATED: CPT

## 2019-03-09 RX ORDER — POTASSIUM CHLORIDE 20 MEQ/1
40 TABLET, EXTENDED RELEASE ORAL PRN
Status: DISCONTINUED | OUTPATIENT
Start: 2019-03-09 | End: 2019-03-11 | Stop reason: HOSPADM

## 2019-03-09 RX ORDER — POLYETHYLENE GLYCOL 3350 17 G/17G
17 POWDER, FOR SOLUTION ORAL ONCE
Status: COMPLETED | OUTPATIENT
Start: 2019-03-09 | End: 2019-03-09

## 2019-03-09 RX ORDER — HYDROCODONE BITARTRATE AND ACETAMINOPHEN 5; 325 MG/1; MG/1
1 TABLET ORAL EVERY 6 HOURS PRN
Status: DISCONTINUED | OUTPATIENT
Start: 2019-03-09 | End: 2019-03-11 | Stop reason: HOSPADM

## 2019-03-09 RX ORDER — POTASSIUM CHLORIDE 20MEQ/15ML
40 LIQUID (ML) ORAL PRN
Status: DISCONTINUED | OUTPATIENT
Start: 2019-03-09 | End: 2019-03-11 | Stop reason: HOSPADM

## 2019-03-09 RX ORDER — DOCUSATE SODIUM 100 MG/1
100 CAPSULE, LIQUID FILLED ORAL 2 TIMES DAILY
Status: DISCONTINUED | OUTPATIENT
Start: 2019-03-09 | End: 2019-03-11 | Stop reason: HOSPADM

## 2019-03-09 RX ORDER — SODIUM CHLORIDE 9 MG/ML
INJECTION, SOLUTION INTRAVENOUS CONTINUOUS
Status: DISCONTINUED | OUTPATIENT
Start: 2019-03-09 | End: 2019-03-10

## 2019-03-09 RX ORDER — SUCRALFATE 1 G/1
1 TABLET ORAL EVERY 6 HOURS SCHEDULED
Status: DISCONTINUED | OUTPATIENT
Start: 2019-03-09 | End: 2019-03-11 | Stop reason: HOSPADM

## 2019-03-09 RX ORDER — POTASSIUM CHLORIDE 7.45 MG/ML
10 INJECTION INTRAVENOUS PRN
Status: DISCONTINUED | OUTPATIENT
Start: 2019-03-09 | End: 2019-03-11 | Stop reason: HOSPADM

## 2019-03-09 RX ADMIN — HYDROCODONE BITARTRATE AND ACETAMINOPHEN 1 TABLET: 5; 325 TABLET ORAL at 20:30

## 2019-03-09 RX ADMIN — POTASSIUM CHLORIDE 40 MEQ: 20 TABLET, EXTENDED RELEASE ORAL at 11:09

## 2019-03-09 RX ADMIN — FEBUXOSTAT 40 MG: 40 TABLET ORAL at 09:38

## 2019-03-09 RX ADMIN — SUCRALFATE 1 G: 1 TABLET ORAL at 23:07

## 2019-03-09 RX ADMIN — POTASSIUM CHLORIDE 40 MEQ: 20 TABLET, EXTENDED RELEASE ORAL at 05:36

## 2019-03-09 RX ADMIN — HYDROMORPHONE HYDROCHLORIDE 0.5 MG: 1 INJECTION, SOLUTION INTRAMUSCULAR; INTRAVENOUS; SUBCUTANEOUS at 16:47

## 2019-03-09 RX ADMIN — COLCHICINE 0.6 MG: 0.6 TABLET, FILM COATED ORAL at 09:39

## 2019-03-09 RX ADMIN — Medication 10 ML: at 09:42

## 2019-03-09 RX ADMIN — POLYETHYLENE GLYCOL 3350 17 G: 17 POWDER, FOR SOLUTION ORAL at 11:51

## 2019-03-09 RX ADMIN — SUCRALFATE 1 G: 1 TABLET ORAL at 16:48

## 2019-03-09 RX ADMIN — PANTOPRAZOLE SODIUM 40 MG: 40 TABLET, DELAYED RELEASE ORAL at 05:36

## 2019-03-09 RX ADMIN — ENOXAPARIN SODIUM 40 MG: 40 INJECTION SUBCUTANEOUS at 09:38

## 2019-03-09 RX ADMIN — HYDROMORPHONE HYDROCHLORIDE 0.5 MG: 1 INJECTION, SOLUTION INTRAMUSCULAR; INTRAVENOUS; SUBCUTANEOUS at 11:09

## 2019-03-09 RX ADMIN — HYDROMORPHONE HYDROCHLORIDE 0.5 MG: 1 INJECTION, SOLUTION INTRAMUSCULAR; INTRAVENOUS; SUBCUTANEOUS at 01:47

## 2019-03-09 RX ADMIN — HYDROMORPHONE HYDROCHLORIDE 0.5 MG: 1 INJECTION, SOLUTION INTRAMUSCULAR; INTRAVENOUS; SUBCUTANEOUS at 07:09

## 2019-03-09 RX ADMIN — AMLODIPINE BESYLATE 5 MG: 5 TABLET ORAL at 09:39

## 2019-03-09 RX ADMIN — SODIUM CHLORIDE: 9 INJECTION, SOLUTION INTRAVENOUS at 11:54

## 2019-03-09 RX ADMIN — DOCUSATE SODIUM 100 MG: 100 CAPSULE, LIQUID FILLED ORAL at 11:50

## 2019-03-09 RX ADMIN — DOCUSATE SODIUM 100 MG: 100 CAPSULE, LIQUID FILLED ORAL at 20:30

## 2019-03-09 ASSESSMENT — PAIN SCALES - GENERAL
PAINLEVEL_OUTOF10: 8
PAINLEVEL_OUTOF10: 8
PAINLEVEL_OUTOF10: 7

## 2019-03-09 ASSESSMENT — PAIN - FUNCTIONAL ASSESSMENT
PAIN_FUNCTIONAL_ASSESSMENT: 0-10

## 2019-03-09 ASSESSMENT — PAIN DESCRIPTION - ONSET: ONSET: ON-GOING

## 2019-03-09 ASSESSMENT — PAIN DESCRIPTION - FREQUENCY: FREQUENCY: CONTINUOUS

## 2019-03-09 ASSESSMENT — PAIN DESCRIPTION - PAIN TYPE: TYPE: ACUTE PAIN

## 2019-03-09 ASSESSMENT — PAIN DESCRIPTION - DESCRIPTORS: DESCRIPTORS: SHOOTING

## 2019-03-09 ASSESSMENT — PAIN DESCRIPTION - LOCATION: LOCATION: ABDOMEN

## 2019-03-10 LAB
ABSOLUTE EOS #: 0.37 K/UL (ref 0–0.44)
ABSOLUTE IMMATURE GRANULOCYTE: <0.03 K/UL (ref 0–0.3)
ABSOLUTE LYMPH #: 2.54 K/UL (ref 1.1–3.7)
ABSOLUTE MONO #: 0.92 K/UL (ref 0.1–1.2)
AMPHETAMINE SCREEN URINE: NEGATIVE
ANION GAP SERPL CALCULATED.3IONS-SCNC: 10 MMOL/L (ref 9–17)
BARBITURATE SCREEN URINE: NEGATIVE
BASOPHILS # BLD: 1 % (ref 0–2)
BASOPHILS ABSOLUTE: 0.09 K/UL (ref 0–0.2)
BENZODIAZEPINE SCREEN, URINE: NEGATIVE
BUN BLDV-MCNC: 18 MG/DL (ref 6–20)
BUN/CREAT BLD: ABNORMAL (ref 9–20)
BUPRENORPHINE URINE: ABNORMAL
CALCIUM SERPL-MCNC: 8.5 MG/DL (ref 8.6–10.4)
CANNABINOID SCREEN URINE: NEGATIVE
CHLORIDE BLD-SCNC: 105 MMOL/L (ref 98–107)
CO2: 22 MMOL/L (ref 20–31)
COCAINE METABOLITE, URINE: POSITIVE
CREAT SERPL-MCNC: 0.98 MG/DL (ref 0.7–1.2)
DIFFERENTIAL TYPE: ABNORMAL
EOSINOPHILS RELATIVE PERCENT: 5 % (ref 1–4)
GFR AFRICAN AMERICAN: >60 ML/MIN
GFR NON-AFRICAN AMERICAN: >60 ML/MIN
GFR SERPL CREATININE-BSD FRML MDRD: ABNORMAL ML/MIN/{1.73_M2}
GFR SERPL CREATININE-BSD FRML MDRD: ABNORMAL ML/MIN/{1.73_M2}
GLUCOSE BLD-MCNC: 110 MG/DL (ref 70–99)
HCT VFR BLD CALC: 45.3 % (ref 40.7–50.3)
HEMOGLOBIN: 14.3 G/DL (ref 13–17)
IMMATURE GRANULOCYTES: 0 %
LACTIC ACID, WHOLE BLOOD: 1.5 MMOL/L (ref 0.7–2.1)
LYMPHOCYTES # BLD: 33 % (ref 24–43)
MCH RBC QN AUTO: 27.9 PG (ref 25.2–33.5)
MCHC RBC AUTO-ENTMCNC: 31.6 G/DL (ref 28.4–34.8)
MCV RBC AUTO: 88.3 FL (ref 82.6–102.9)
MDMA URINE: ABNORMAL
METHADONE SCREEN, URINE: NEGATIVE
METHAMPHETAMINE, URINE: ABNORMAL
MONOCYTES # BLD: 12 % (ref 3–12)
NRBC AUTOMATED: 0 PER 100 WBC
OPIATES, URINE: POSITIVE
OXYCODONE SCREEN URINE: NEGATIVE
PDW BLD-RTO: 13.2 % (ref 11.8–14.4)
PHENCYCLIDINE, URINE: NEGATIVE
PLATELET # BLD: 181 K/UL (ref 138–453)
PLATELET ESTIMATE: ABNORMAL
PMV BLD AUTO: 10.4 FL (ref 8.1–13.5)
POTASSIUM SERPL-SCNC: 3.7 MMOL/L (ref 3.7–5.3)
PROPOXYPHENE, URINE: ABNORMAL
RBC # BLD: 5.13 M/UL (ref 4.21–5.77)
RBC # BLD: ABNORMAL 10*6/UL
SEG NEUTROPHILS: 49 % (ref 36–65)
SEGMENTED NEUTROPHILS ABSOLUTE COUNT: 3.86 K/UL (ref 1.5–8.1)
SODIUM BLD-SCNC: 137 MMOL/L (ref 135–144)
TEST INFORMATION: ABNORMAL
TRICYCLIC ANTIDEPRESSANTS, UR: ABNORMAL
WBC # BLD: 7.8 K/UL (ref 3.5–11.3)
WBC # BLD: ABNORMAL 10*3/UL

## 2019-03-10 PROCEDURE — 6370000000 HC RX 637 (ALT 250 FOR IP): Performed by: INTERNAL MEDICINE

## 2019-03-10 PROCEDURE — 99232 SBSQ HOSP IP/OBS MODERATE 35: CPT | Performed by: INTERNAL MEDICINE

## 2019-03-10 PROCEDURE — 85025 COMPLETE CBC W/AUTO DIFF WBC: CPT

## 2019-03-10 PROCEDURE — 80048 BASIC METABOLIC PNL TOTAL CA: CPT

## 2019-03-10 PROCEDURE — 83605 ASSAY OF LACTIC ACID: CPT

## 2019-03-10 PROCEDURE — 6360000002 HC RX W HCPCS: Performed by: INTERNAL MEDICINE

## 2019-03-10 PROCEDURE — APPNB45 APP NON BILLABLE 31-45 MINUTES: Performed by: INTERNAL MEDICINE

## 2019-03-10 PROCEDURE — 2060000000 HC ICU INTERMEDIATE R&B

## 2019-03-10 PROCEDURE — 6370000000 HC RX 637 (ALT 250 FOR IP): Performed by: STUDENT IN AN ORGANIZED HEALTH CARE EDUCATION/TRAINING PROGRAM

## 2019-03-10 PROCEDURE — 2580000003 HC RX 258: Performed by: INTERNAL MEDICINE

## 2019-03-10 PROCEDURE — 36415 COLL VENOUS BLD VENIPUNCTURE: CPT

## 2019-03-10 RX ORDER — MAGNESIUM HYDROXIDE 1200 MG/15ML
LIQUID ORAL
Status: DISPENSED
Start: 2019-03-10 | End: 2019-03-10

## 2019-03-10 RX ORDER — AMLODIPINE BESYLATE 10 MG/1
10 TABLET ORAL DAILY
Status: DISCONTINUED | OUTPATIENT
Start: 2019-03-11 | End: 2019-03-11 | Stop reason: HOSPADM

## 2019-03-10 RX ORDER — HYDRALAZINE HYDROCHLORIDE 20 MG/ML
10 INJECTION INTRAMUSCULAR; INTRAVENOUS EVERY 6 HOURS PRN
Status: DISCONTINUED | OUTPATIENT
Start: 2019-03-10 | End: 2019-03-11 | Stop reason: HOSPADM

## 2019-03-10 RX ADMIN — HYDROCODONE BITARTRATE AND ACETAMINOPHEN 1 TABLET: 5; 325 TABLET ORAL at 04:05

## 2019-03-10 RX ADMIN — DOCUSATE SODIUM 100 MG: 100 CAPSULE, LIQUID FILLED ORAL at 08:12

## 2019-03-10 RX ADMIN — DOCUSATE SODIUM 100 MG: 100 CAPSULE, LIQUID FILLED ORAL at 19:35

## 2019-03-10 RX ADMIN — COLCHICINE 0.6 MG: 0.6 TABLET, FILM COATED ORAL at 08:12

## 2019-03-10 RX ADMIN — MAGNESIUM HYDROXIDE 30 ML: 400 SUSPENSION ORAL at 10:46

## 2019-03-10 RX ADMIN — FEBUXOSTAT 40 MG: 40 TABLET ORAL at 08:12

## 2019-03-10 RX ADMIN — SUCRALFATE 1 G: 1 TABLET ORAL at 16:52

## 2019-03-10 RX ADMIN — HYDROCODONE BITARTRATE AND ACETAMINOPHEN 1 TABLET: 5; 325 TABLET ORAL at 23:05

## 2019-03-10 RX ADMIN — AMLODIPINE BESYLATE 5 MG: 5 TABLET ORAL at 08:12

## 2019-03-10 RX ADMIN — HYDROCODONE BITARTRATE AND ACETAMINOPHEN 1 TABLET: 5; 325 TABLET ORAL at 16:52

## 2019-03-10 RX ADMIN — SUCRALFATE 1 G: 1 TABLET ORAL at 13:27

## 2019-03-10 RX ADMIN — Medication 10 ML: at 19:35

## 2019-03-10 RX ADMIN — HYDRALAZINE HYDROCHLORIDE 10 MG: 20 INJECTION INTRAMUSCULAR; INTRAVENOUS at 18:31

## 2019-03-10 RX ADMIN — ENOXAPARIN SODIUM 40 MG: 40 INJECTION SUBCUTANEOUS at 08:12

## 2019-03-10 RX ADMIN — PANTOPRAZOLE SODIUM 40 MG: 40 TABLET, DELAYED RELEASE ORAL at 06:02

## 2019-03-10 RX ADMIN — SUCRALFATE 1 G: 1 TABLET ORAL at 06:02

## 2019-03-10 RX ADMIN — HYDROCODONE BITARTRATE AND ACETAMINOPHEN 1 TABLET: 5; 325 TABLET ORAL at 10:47

## 2019-03-10 RX ADMIN — SUCRALFATE 1 G: 1 TABLET ORAL at 23:05

## 2019-03-10 ASSESSMENT — PAIN SCALES - GENERAL
PAINLEVEL_OUTOF10: 6
PAINLEVEL_OUTOF10: 6
PAINLEVEL_OUTOF10: 7
PAINLEVEL_OUTOF10: 7
PAINLEVEL_OUTOF10: 6

## 2019-03-10 ASSESSMENT — PAIN DESCRIPTION - PAIN TYPE: TYPE: ACUTE PAIN

## 2019-03-10 ASSESSMENT — PAIN DESCRIPTION - LOCATION: LOCATION: ABDOMEN

## 2019-03-11 ENCOUNTER — ANESTHESIA (OUTPATIENT)
Dept: ENDOSCOPY | Age: 54
DRG: 305 | End: 2019-03-11
Payer: COMMERCIAL

## 2019-03-11 ENCOUNTER — ANESTHESIA EVENT (OUTPATIENT)
Dept: ENDOSCOPY | Age: 54
DRG: 305 | End: 2019-03-11
Payer: COMMERCIAL

## 2019-03-11 VITALS
TEMPERATURE: 97.5 F | WEIGHT: 315 LBS | SYSTOLIC BLOOD PRESSURE: 155 MMHG | BODY MASS INDEX: 46.65 KG/M2 | HEART RATE: 83 BPM | RESPIRATION RATE: 38 BRPM | OXYGEN SATURATION: 97 % | HEIGHT: 69 IN | DIASTOLIC BLOOD PRESSURE: 88 MMHG

## 2019-03-11 VITALS
SYSTOLIC BLOOD PRESSURE: 149 MMHG | OXYGEN SATURATION: 98 % | RESPIRATION RATE: 19 BRPM | DIASTOLIC BLOOD PRESSURE: 86 MMHG

## 2019-03-11 PROBLEM — I16.1 HYPERTENSIVE EMERGENCY: Status: RESOLVED | Noted: 2019-03-08 | Resolved: 2019-03-11

## 2019-03-11 LAB
ANION GAP SERPL CALCULATED.3IONS-SCNC: 10 MMOL/L (ref 9–17)
BUN BLDV-MCNC: 13 MG/DL (ref 6–20)
BUN/CREAT BLD: ABNORMAL (ref 9–20)
CALCIUM SERPL-MCNC: 8.6 MG/DL (ref 8.6–10.4)
CHLORIDE BLD-SCNC: 108 MMOL/L (ref 98–107)
CO2: 21 MMOL/L (ref 20–31)
CREAT SERPL-MCNC: 0.83 MG/DL (ref 0.7–1.2)
EKG ATRIAL RATE: 66 BPM
EKG P AXIS: 43 DEGREES
EKG P-R INTERVAL: 202 MS
EKG Q-T INTERVAL: 448 MS
EKG QRS DURATION: 108 MS
EKG QTC CALCULATION (BAZETT): 469 MS
EKG R AXIS: 56 DEGREES
EKG T AXIS: 128 DEGREES
EKG VENTRICULAR RATE: 66 BPM
ESTIMATED AVERAGE GLUCOSE: 100 MG/DL
GFR AFRICAN AMERICAN: >60 ML/MIN
GFR NON-AFRICAN AMERICAN: >60 ML/MIN
GFR SERPL CREATININE-BSD FRML MDRD: ABNORMAL ML/MIN/{1.73_M2}
GFR SERPL CREATININE-BSD FRML MDRD: ABNORMAL ML/MIN/{1.73_M2}
GLUCOSE BLD-MCNC: 95 MG/DL (ref 70–99)
HBA1C MFR BLD: 5.1 % (ref 4–6)
LV EF: 55 %
LVEF MODALITY: NORMAL
POTASSIUM SERPL-SCNC: 4.3 MMOL/L (ref 3.7–5.3)
SODIUM BLD-SCNC: 139 MMOL/L (ref 135–144)

## 2019-03-11 PROCEDURE — 88342 IMHCHEM/IMCYTCHM 1ST ANTB: CPT

## 2019-03-11 PROCEDURE — 2580000003 HC RX 258: Performed by: NURSE ANESTHETIST, CERTIFIED REGISTERED

## 2019-03-11 PROCEDURE — 93306 TTE W/DOPPLER COMPLETE: CPT

## 2019-03-11 PROCEDURE — 99238 HOSP IP/OBS DSCHRG MGMT 30/<: CPT | Performed by: INTERNAL MEDICINE

## 2019-03-11 PROCEDURE — 36415 COLL VENOUS BLD VENIPUNCTURE: CPT

## 2019-03-11 PROCEDURE — 2580000003 HC RX 258: Performed by: INTERNAL MEDICINE

## 2019-03-11 PROCEDURE — 7100000010 HC PHASE II RECOVERY - FIRST 15 MIN: Performed by: INTERNAL MEDICINE

## 2019-03-11 PROCEDURE — 3609012400 HC EGD TRANSORAL BIOPSY SINGLE/MULTIPLE: Performed by: INTERNAL MEDICINE

## 2019-03-11 PROCEDURE — 7100000011 HC PHASE II RECOVERY - ADDTL 15 MIN: Performed by: INTERNAL MEDICINE

## 2019-03-11 PROCEDURE — 6370000000 HC RX 637 (ALT 250 FOR IP): Performed by: INTERNAL MEDICINE

## 2019-03-11 PROCEDURE — 6360000002 HC RX W HCPCS: Performed by: INTERNAL MEDICINE

## 2019-03-11 PROCEDURE — 43239 EGD BIOPSY SINGLE/MULTIPLE: CPT | Performed by: INTERNAL MEDICINE

## 2019-03-11 PROCEDURE — 0DB48ZX EXCISION OF ESOPHAGOGASTRIC JUNCTION, VIA NATURAL OR ARTIFICIAL OPENING ENDOSCOPIC, DIAGNOSTIC: ICD-10-PCS | Performed by: INTERNAL MEDICINE

## 2019-03-11 PROCEDURE — 0DB68ZX EXCISION OF STOMACH, VIA NATURAL OR ARTIFICIAL OPENING ENDOSCOPIC, DIAGNOSTIC: ICD-10-PCS | Performed by: INTERNAL MEDICINE

## 2019-03-11 PROCEDURE — 80048 BASIC METABOLIC PNL TOTAL CA: CPT

## 2019-03-11 PROCEDURE — 88305 TISSUE EXAM BY PATHOLOGIST: CPT

## 2019-03-11 PROCEDURE — 2709999900 HC NON-CHARGEABLE SUPPLY: Performed by: INTERNAL MEDICINE

## 2019-03-11 PROCEDURE — 6360000002 HC RX W HCPCS: Performed by: NURSE ANESTHETIST, CERTIFIED REGISTERED

## 2019-03-11 PROCEDURE — 2500000003 HC RX 250 WO HCPCS: Performed by: NURSE ANESTHETIST, CERTIFIED REGISTERED

## 2019-03-11 PROCEDURE — 3700000000 HC ANESTHESIA ATTENDED CARE: Performed by: INTERNAL MEDICINE

## 2019-03-11 RX ORDER — GLYCOPYRROLATE 1 MG/5 ML
SYRINGE (ML) INTRAVENOUS PRN
Status: DISCONTINUED | OUTPATIENT
Start: 2019-03-11 | End: 2019-03-11 | Stop reason: SDUPTHER

## 2019-03-11 RX ORDER — AMLODIPINE BESYLATE 10 MG/1
10 TABLET ORAL DAILY
Qty: 30 TABLET | Refills: 3 | Status: SHIPPED | OUTPATIENT
Start: 2019-03-12 | End: 2019-11-19

## 2019-03-11 RX ORDER — LACTULOSE 10 G/15ML
10 SOLUTION ORAL ONCE
Status: DISCONTINUED | OUTPATIENT
Start: 2019-03-11 | End: 2019-03-11 | Stop reason: HOSPADM

## 2019-03-11 RX ORDER — PROPOFOL 10 MG/ML
INJECTION, EMULSION INTRAVENOUS PRN
Status: DISCONTINUED | OUTPATIENT
Start: 2019-03-11 | End: 2019-03-11 | Stop reason: SDUPTHER

## 2019-03-11 RX ORDER — LIDOCAINE HYDROCHLORIDE 10 MG/ML
INJECTION, SOLUTION INFILTRATION; PERINEURAL PRN
Status: DISCONTINUED | OUTPATIENT
Start: 2019-03-11 | End: 2019-03-11 | Stop reason: SDUPTHER

## 2019-03-11 RX ORDER — SODIUM CHLORIDE 9 MG/ML
INJECTION, SOLUTION INTRAVENOUS CONTINUOUS PRN
Status: DISCONTINUED | OUTPATIENT
Start: 2019-03-11 | End: 2019-03-11 | Stop reason: SDUPTHER

## 2019-03-11 RX ORDER — LANSOPRAZOLE 30 MG/1
30 CAPSULE, DELAYED RELEASE ORAL DAILY
Qty: 30 CAPSULE | Refills: 3 | Status: SHIPPED | OUTPATIENT
Start: 2019-03-11 | End: 2019-11-19 | Stop reason: DRUGHIGH

## 2019-03-11 RX ORDER — PSEUDOEPHEDRINE HCL 30 MG
100 TABLET ORAL 2 TIMES DAILY PRN
Qty: 60 CAPSULE | Refills: 0 | Status: SHIPPED | OUTPATIENT
Start: 2019-03-11 | End: 2019-11-19

## 2019-03-11 RX ADMIN — AMLODIPINE BESYLATE 10 MG: 10 TABLET ORAL at 12:58

## 2019-03-11 RX ADMIN — ENOXAPARIN SODIUM 40 MG: 40 INJECTION SUBCUTANEOUS at 12:57

## 2019-03-11 RX ADMIN — DOCUSATE SODIUM 100 MG: 100 CAPSULE, LIQUID FILLED ORAL at 12:58

## 2019-03-11 RX ADMIN — SUCRALFATE 1 G: 1 TABLET ORAL at 12:57

## 2019-03-11 RX ADMIN — Medication 10 ML: at 08:52

## 2019-03-11 RX ADMIN — PANTOPRAZOLE SODIUM 40 MG: 40 TABLET, DELAYED RELEASE ORAL at 12:59

## 2019-03-11 RX ADMIN — FEBUXOSTAT 40 MG: 40 TABLET ORAL at 12:57

## 2019-03-11 RX ADMIN — HYDROCODONE BITARTRATE AND ACETAMINOPHEN 1 TABLET: 5; 325 TABLET ORAL at 13:22

## 2019-03-11 RX ADMIN — COLCHICINE 0.6 MG: 0.6 TABLET, FILM COATED ORAL at 13:00

## 2019-03-11 RX ADMIN — SODIUM CHLORIDE: 9 INJECTION, SOLUTION INTRAVENOUS at 10:53

## 2019-03-11 RX ADMIN — PROPOFOL 250 MG: 10 INJECTION, EMULSION INTRAVENOUS at 11:02

## 2019-03-11 RX ADMIN — LIDOCAINE HYDROCHLORIDE 100 MG: 10 INJECTION, SOLUTION INFILTRATION; PERINEURAL at 11:02

## 2019-03-11 RX ADMIN — SODIUM CHLORIDE: 9 INJECTION, SOLUTION INTRAVENOUS at 10:50

## 2019-03-11 RX ADMIN — Medication 0.2 MG: at 11:02

## 2019-03-11 ASSESSMENT — PAIN DESCRIPTION - LOCATION
LOCATION: ABDOMEN
LOCATION: ABDOMEN

## 2019-03-11 ASSESSMENT — PAIN SCALES - GENERAL
PAINLEVEL_OUTOF10: 8
PAINLEVEL_OUTOF10: 0
PAINLEVEL_OUTOF10: 6
PAINLEVEL_OUTOF10: 7
PAINLEVEL_OUTOF10: 6
PAINLEVEL_OUTOF10: 6

## 2019-03-11 ASSESSMENT — PAIN DESCRIPTION - PAIN TYPE
TYPE: ACUTE PAIN
TYPE: CHRONIC PAIN

## 2019-03-11 ASSESSMENT — PAIN DESCRIPTION - ORIENTATION: ORIENTATION: UPPER;MID

## 2019-03-11 ASSESSMENT — PAIN DESCRIPTION - FREQUENCY: FREQUENCY: CONTINUOUS

## 2019-03-11 ASSESSMENT — PAIN - FUNCTIONAL ASSESSMENT: PAIN_FUNCTIONAL_ASSESSMENT: 0-10

## 2019-03-11 ASSESSMENT — PAIN DESCRIPTION - ONSET: ONSET: ON-GOING

## 2019-03-11 ASSESSMENT — PAIN DESCRIPTION - DESCRIPTORS: DESCRIPTORS: SHOOTING

## 2019-03-12 ENCOUNTER — TELEPHONE (OUTPATIENT)
Dept: INTERNAL MEDICINE CLINIC | Age: 54
End: 2019-03-12

## 2019-03-13 ENCOUNTER — TELEPHONE (OUTPATIENT)
Dept: INTERNAL MEDICINE CLINIC | Age: 54
End: 2019-03-13

## 2019-03-13 LAB — SURGICAL PATHOLOGY REPORT: NORMAL

## 2019-03-26 LAB
EKG ATRIAL RATE: 95 BPM
EKG P AXIS: 40 DEGREES
EKG P-R INTERVAL: 182 MS
EKG Q-T INTERVAL: 382 MS
EKG QRS DURATION: 98 MS
EKG QTC CALCULATION (BAZETT): 480 MS
EKG R AXIS: -104 DEGREES
EKG T AXIS: 115 DEGREES
EKG VENTRICULAR RATE: 95 BPM

## 2019-11-19 ENCOUNTER — APPOINTMENT (OUTPATIENT)
Dept: CT IMAGING | Age: 54
DRG: 281 | End: 2019-11-19
Payer: COMMERCIAL

## 2019-11-19 ENCOUNTER — HOSPITAL ENCOUNTER (INPATIENT)
Age: 54
LOS: 1 days | Discharge: HOME | DRG: 281 | End: 2019-11-21
Attending: EMERGENCY MEDICINE | Admitting: INTERNAL MEDICINE
Payer: COMMERCIAL

## 2019-11-19 ENCOUNTER — APPOINTMENT (OUTPATIENT)
Dept: GENERAL RADIOLOGY | Age: 54
DRG: 281 | End: 2019-11-19
Payer: COMMERCIAL

## 2019-11-19 DIAGNOSIS — R07.9 CHEST PAIN, UNSPECIFIED TYPE: Primary | ICD-10-CM

## 2019-11-19 DIAGNOSIS — F14.10 NONDEPENDENT COCAINE ABUSE (HCC): ICD-10-CM

## 2019-11-19 DIAGNOSIS — I21.4 NSTEMI (NON-ST ELEVATED MYOCARDIAL INFARCTION) (HCC): ICD-10-CM

## 2019-11-19 DIAGNOSIS — I16.0 HYPERTENSIVE URGENCY: ICD-10-CM

## 2019-11-19 PROBLEM — Z72.0 TOBACCO ABUSE: Status: ACTIVE | Noted: 2019-11-19

## 2019-11-19 LAB
ABSOLUTE EOS #: 0.1 K/UL (ref 0–0.4)
ABSOLUTE IMMATURE GRANULOCYTE: ABNORMAL K/UL (ref 0–0.3)
ABSOLUTE LYMPH #: 2.3 K/UL (ref 1–4.8)
ABSOLUTE MONO #: 0.7 K/UL (ref 0.1–1.3)
ALBUMIN SERPL-MCNC: 4.2 G/DL (ref 3.5–5.2)
ALBUMIN/GLOBULIN RATIO: ABNORMAL (ref 1–2.5)
ALP BLD-CCNC: 92 U/L (ref 40–129)
ALT SERPL-CCNC: 21 U/L (ref 5–41)
AMPHETAMINE SCREEN URINE: NEGATIVE
ANION GAP SERPL CALCULATED.3IONS-SCNC: 15 MMOL/L (ref 9–17)
AST SERPL-CCNC: 19 U/L
BARBITURATE SCREEN URINE: NEGATIVE
BASOPHILS # BLD: 1 % (ref 0–2)
BASOPHILS ABSOLUTE: 0.1 K/UL (ref 0–0.2)
BENZODIAZEPINE SCREEN, URINE: NEGATIVE
BILIRUB SERPL-MCNC: 0.49 MG/DL (ref 0.3–1.2)
BUN BLDV-MCNC: 15 MG/DL (ref 6–20)
BUN/CREAT BLD: ABNORMAL (ref 9–20)
BUPRENORPHINE URINE: ABNORMAL
CALCIUM SERPL-MCNC: 9.3 MG/DL (ref 8.6–10.4)
CANNABINOID SCREEN URINE: NEGATIVE
CHLORIDE BLD-SCNC: 105 MMOL/L (ref 98–107)
CO2: 21 MMOL/L (ref 20–31)
COCAINE METABOLITE, URINE: POSITIVE
CREAT SERPL-MCNC: 0.74 MG/DL (ref 0.7–1.2)
DIFFERENTIAL TYPE: ABNORMAL
EOSINOPHILS RELATIVE PERCENT: 1 % (ref 0–4)
GFR AFRICAN AMERICAN: >60 ML/MIN
GFR NON-AFRICAN AMERICAN: >60 ML/MIN
GFR SERPL CREATININE-BSD FRML MDRD: ABNORMAL ML/MIN/{1.73_M2}
GFR SERPL CREATININE-BSD FRML MDRD: ABNORMAL ML/MIN/{1.73_M2}
GLUCOSE BLD-MCNC: 107 MG/DL (ref 70–99)
HCT VFR BLD CALC: 45.7 % (ref 41–53)
HEMOGLOBIN: 15.2 G/DL (ref 13.5–17.5)
IMMATURE GRANULOCYTES: ABNORMAL %
LYMPHOCYTES # BLD: 19 % (ref 24–44)
MAGNESIUM: 2.2 MG/DL (ref 1.6–2.6)
MCH RBC QN AUTO: 27.9 PG (ref 26–34)
MCHC RBC AUTO-ENTMCNC: 33.3 G/DL (ref 31–37)
MCV RBC AUTO: 83.7 FL (ref 80–100)
MDMA URINE: ABNORMAL
METHADONE SCREEN, URINE: NEGATIVE
METHAMPHETAMINE, URINE: ABNORMAL
MONOCYTES # BLD: 5 % (ref 1–7)
NRBC AUTOMATED: ABNORMAL PER 100 WBC
OPIATES, URINE: NEGATIVE
OXYCODONE SCREEN URINE: NEGATIVE
PDW BLD-RTO: 14.1 % (ref 11.5–14.9)
PHENCYCLIDINE, URINE: NEGATIVE
PLATELET # BLD: 269 K/UL (ref 150–450)
PLATELET ESTIMATE: ABNORMAL
PMV BLD AUTO: 8.5 FL (ref 6–12)
POTASSIUM SERPL-SCNC: 4 MMOL/L (ref 3.7–5.3)
PROPOXYPHENE, URINE: ABNORMAL
RBC # BLD: 5.46 M/UL (ref 4.5–5.9)
RBC # BLD: ABNORMAL 10*6/UL
SEG NEUTROPHILS: 74 % (ref 36–66)
SEGMENTED NEUTROPHILS ABSOLUTE COUNT: 9.1 K/UL (ref 1.3–9.1)
SODIUM BLD-SCNC: 141 MMOL/L (ref 135–144)
TEST INFORMATION: ABNORMAL
TOTAL PROTEIN: 7.1 G/DL (ref 6.4–8.3)
TRICYCLIC ANTIDEPRESSANTS, UR: ABNORMAL
TROPONIN INTERP: ABNORMAL
TROPONIN INTERP: ABNORMAL
TROPONIN INTERP: NORMAL
TROPONIN T: ABNORMAL NG/ML
TROPONIN T: ABNORMAL NG/ML
TROPONIN T: NORMAL NG/ML
TROPONIN, HIGH SENSITIVITY: 16 NG/L (ref 0–22)
TROPONIN, HIGH SENSITIVITY: 55 NG/L (ref 0–22)
TROPONIN, HIGH SENSITIVITY: 70 NG/L (ref 0–22)
WBC # BLD: 12.3 K/UL (ref 3.5–11)
WBC # BLD: ABNORMAL 10*3/UL

## 2019-11-19 PROCEDURE — 6370000000 HC RX 637 (ALT 250 FOR IP): Performed by: STUDENT IN AN ORGANIZED HEALTH CARE EDUCATION/TRAINING PROGRAM

## 2019-11-19 PROCEDURE — 96376 TX/PRO/DX INJ SAME DRUG ADON: CPT

## 2019-11-19 PROCEDURE — 2500000003 HC RX 250 WO HCPCS: Performed by: STUDENT IN AN ORGANIZED HEALTH CARE EDUCATION/TRAINING PROGRAM

## 2019-11-19 PROCEDURE — 6360000004 HC RX CONTRAST MEDICATION: Performed by: STUDENT IN AN ORGANIZED HEALTH CARE EDUCATION/TRAINING PROGRAM

## 2019-11-19 PROCEDURE — 85025 COMPLETE CBC W/AUTO DIFF WBC: CPT

## 2019-11-19 PROCEDURE — 96375 TX/PRO/DX INJ NEW DRUG ADDON: CPT

## 2019-11-19 PROCEDURE — 6360000002 HC RX W HCPCS: Performed by: STUDENT IN AN ORGANIZED HEALTH CARE EDUCATION/TRAINING PROGRAM

## 2019-11-19 PROCEDURE — G0378 HOSPITAL OBSERVATION PER HR: HCPCS

## 2019-11-19 PROCEDURE — 71275 CT ANGIOGRAPHY CHEST: CPT

## 2019-11-19 PROCEDURE — 80307 DRUG TEST PRSMV CHEM ANLYZR: CPT

## 2019-11-19 PROCEDURE — 36415 COLL VENOUS BLD VENIPUNCTURE: CPT

## 2019-11-19 PROCEDURE — 2580000003 HC RX 258: Performed by: STUDENT IN AN ORGANIZED HEALTH CARE EDUCATION/TRAINING PROGRAM

## 2019-11-19 PROCEDURE — 93005 ELECTROCARDIOGRAM TRACING: CPT | Performed by: STUDENT IN AN ORGANIZED HEALTH CARE EDUCATION/TRAINING PROGRAM

## 2019-11-19 PROCEDURE — 96372 THER/PROPH/DIAG INJ SC/IM: CPT

## 2019-11-19 PROCEDURE — 6370000000 HC RX 637 (ALT 250 FOR IP): Performed by: NURSE PRACTITIONER

## 2019-11-19 PROCEDURE — 71045 X-RAY EXAM CHEST 1 VIEW: CPT

## 2019-11-19 PROCEDURE — 80053 COMPREHEN METABOLIC PANEL: CPT

## 2019-11-19 PROCEDURE — 6360000002 HC RX W HCPCS: Performed by: EMERGENCY MEDICINE

## 2019-11-19 PROCEDURE — 84484 ASSAY OF TROPONIN QUANT: CPT

## 2019-11-19 PROCEDURE — 93005 ELECTROCARDIOGRAM TRACING: CPT

## 2019-11-19 PROCEDURE — 83735 ASSAY OF MAGNESIUM: CPT

## 2019-11-19 PROCEDURE — 99285 EMERGENCY DEPT VISIT HI MDM: CPT

## 2019-11-19 PROCEDURE — 96374 THER/PROPH/DIAG INJ IV PUSH: CPT

## 2019-11-19 RX ORDER — LANSOPRAZOLE 15 MG/1
30 CAPSULE, DELAYED RELEASE ORAL DAILY
COMMUNITY
End: 2021-02-18 | Stop reason: SDUPTHER

## 2019-11-19 RX ORDER — SODIUM CHLORIDE 9 MG/ML
25 INJECTION, SOLUTION INTRAVENOUS PRN
Status: DISCONTINUED | OUTPATIENT
Start: 2019-11-19 | End: 2019-11-21 | Stop reason: HOSPADM

## 2019-11-19 RX ORDER — MAGNESIUM SULFATE 1 G/100ML
1 INJECTION INTRAVENOUS PRN
Status: DISCONTINUED | OUTPATIENT
Start: 2019-11-19 | End: 2019-11-21 | Stop reason: HOSPADM

## 2019-11-19 RX ORDER — POTASSIUM CHLORIDE 7.45 MG/ML
10 INJECTION INTRAVENOUS PRN
Status: DISCONTINUED | OUTPATIENT
Start: 2019-11-19 | End: 2019-11-21 | Stop reason: HOSPADM

## 2019-11-19 RX ORDER — LORAZEPAM 2 MG/ML
1 INJECTION INTRAMUSCULAR ONCE
Status: COMPLETED | OUTPATIENT
Start: 2019-11-19 | End: 2019-11-19

## 2019-11-19 RX ORDER — AMLODIPINE BESYLATE 10 MG/1
10 TABLET ORAL DAILY
Status: DISCONTINUED | OUTPATIENT
Start: 2019-11-20 | End: 2019-11-21 | Stop reason: HOSPADM

## 2019-11-19 RX ORDER — PANTOPRAZOLE SODIUM 40 MG/1
40 TABLET, DELAYED RELEASE ORAL
Status: DISCONTINUED | OUTPATIENT
Start: 2019-11-19 | End: 2019-11-21 | Stop reason: HOSPADM

## 2019-11-19 RX ORDER — SODIUM CHLORIDE 0.9 % (FLUSH) 0.9 %
10 SYRINGE (ML) INJECTION PRN
Status: DISCONTINUED | OUTPATIENT
Start: 2019-11-19 | End: 2019-11-21 | Stop reason: HOSPADM

## 2019-11-19 RX ORDER — NITROGLYCERIN 20 MG/100ML
5 INJECTION INTRAVENOUS CONTINUOUS
Status: DISCONTINUED | OUTPATIENT
Start: 2019-11-19 | End: 2019-11-19

## 2019-11-19 RX ORDER — SODIUM CHLORIDE 0.9 % (FLUSH) 0.9 %
10 SYRINGE (ML) INJECTION EVERY 12 HOURS SCHEDULED
Status: DISCONTINUED | OUTPATIENT
Start: 2019-11-20 | End: 2019-11-21 | Stop reason: HOSPADM

## 2019-11-19 RX ORDER — NITROGLYCERIN 20 MG/100ML
5 INJECTION INTRAVENOUS CONTINUOUS
Status: DISCONTINUED | OUTPATIENT
Start: 2019-11-19 | End: 2019-11-21 | Stop reason: HOSPADM

## 2019-11-19 RX ORDER — 0.9 % SODIUM CHLORIDE 0.9 %
80 INTRAVENOUS SOLUTION INTRAVENOUS ONCE
Status: COMPLETED | OUTPATIENT
Start: 2019-11-19 | End: 2019-11-19

## 2019-11-19 RX ORDER — MAGNESIUM HYDROXIDE/ALUMINUM HYDROXICE/SIMETHICONE 120; 1200; 1200 MG/30ML; MG/30ML; MG/30ML
30 SUSPENSION ORAL EVERY 6 HOURS PRN
Status: DISCONTINUED | OUTPATIENT
Start: 2019-11-19 | End: 2019-11-21 | Stop reason: HOSPADM

## 2019-11-19 RX ORDER — ONDANSETRON 2 MG/ML
4 INJECTION INTRAMUSCULAR; INTRAVENOUS EVERY 6 HOURS PRN
Status: DISCONTINUED | OUTPATIENT
Start: 2019-11-19 | End: 2019-11-21 | Stop reason: HOSPADM

## 2019-11-19 RX ORDER — POTASSIUM CHLORIDE 20 MEQ/1
40 TABLET, EXTENDED RELEASE ORAL PRN
Status: DISCONTINUED | OUTPATIENT
Start: 2019-11-19 | End: 2019-11-21 | Stop reason: HOSPADM

## 2019-11-19 RX ORDER — ONDANSETRON 2 MG/ML
4 INJECTION INTRAMUSCULAR; INTRAVENOUS ONCE
Status: COMPLETED | OUTPATIENT
Start: 2019-11-19 | End: 2019-11-19

## 2019-11-19 RX ORDER — LORAZEPAM 2 MG/ML
1 INJECTION INTRAMUSCULAR EVERY 4 HOURS PRN
Status: DISCONTINUED | OUTPATIENT
Start: 2019-11-19 | End: 2019-11-21 | Stop reason: HOSPADM

## 2019-11-19 RX ORDER — PANTOPRAZOLE SODIUM 40 MG/1
40 TABLET, DELAYED RELEASE ORAL
Status: DISCONTINUED | OUTPATIENT
Start: 2019-11-20 | End: 2019-11-19

## 2019-11-19 RX ORDER — ACETAMINOPHEN 325 MG/1
650 TABLET ORAL ONCE
Status: COMPLETED | OUTPATIENT
Start: 2019-11-19 | End: 2019-11-19

## 2019-11-19 RX ORDER — ATORVASTATIN CALCIUM 40 MG/1
40 TABLET, FILM COATED ORAL NIGHTLY
Status: DISCONTINUED | OUTPATIENT
Start: 2019-11-20 | End: 2019-11-21 | Stop reason: HOSPADM

## 2019-11-19 RX ORDER — NITROGLYCERIN 0.4 MG/1
0.4 TABLET SUBLINGUAL EVERY 5 MIN PRN
Status: DISCONTINUED | OUTPATIENT
Start: 2019-11-19 | End: 2019-11-21 | Stop reason: HOSPADM

## 2019-11-19 RX ORDER — ASPIRIN 325 MG
325 TABLET ORAL ONCE
Status: COMPLETED | OUTPATIENT
Start: 2019-11-19 | End: 2019-11-19

## 2019-11-19 RX ADMIN — ALUMINUM HYDROXIDE, MAGNESIUM HYDROXIDE, AND SIMETHICONE 30 ML: 200; 200; 20 SUSPENSION ORAL at 23:23

## 2019-11-19 RX ADMIN — IOPAMIDOL 100 ML: 755 INJECTION, SOLUTION INTRAVENOUS at 17:33

## 2019-11-19 RX ADMIN — NITROGLYCERIN 5 MCG/MIN: 20 INJECTION INTRAVENOUS at 20:54

## 2019-11-19 RX ADMIN — ONDANSETRON 4 MG: 2 INJECTION INTRAMUSCULAR; INTRAVENOUS at 16:44

## 2019-11-19 RX ADMIN — ASPIRIN 325 MG ORAL TABLET 325 MG: 325 PILL ORAL at 16:43

## 2019-11-19 RX ADMIN — ENOXAPARIN SODIUM 160 MG: 100 INJECTION SUBCUTANEOUS at 19:31

## 2019-11-19 RX ADMIN — PANTOPRAZOLE SODIUM 40 MG: 40 TABLET, DELAYED RELEASE ORAL at 23:23

## 2019-11-19 RX ADMIN — LORAZEPAM 1 MG: 2 INJECTION, SOLUTION INTRAMUSCULAR; INTRAVENOUS at 16:51

## 2019-11-19 RX ADMIN — LORAZEPAM 1 MG: 2 INJECTION, SOLUTION INTRAMUSCULAR; INTRAVENOUS at 20:52

## 2019-11-19 RX ADMIN — Medication 10 ML: at 17:33

## 2019-11-19 RX ADMIN — NITROGLYCERIN 0.4 MG: 0.4 TABLET, ORALLY DISINTEGRATING SUBLINGUAL at 16:45

## 2019-11-19 RX ADMIN — SODIUM CHLORIDE 80 ML: 9 INJECTION, SOLUTION INTRAVENOUS at 17:33

## 2019-11-19 RX ADMIN — ACETAMINOPHEN 650 MG: 325 TABLET, FILM COATED ORAL at 18:07

## 2019-11-19 RX ADMIN — NITROGLYCERIN 0.4 MG: 0.4 TABLET, ORALLY DISINTEGRATING SUBLINGUAL at 16:52

## 2019-11-19 ASSESSMENT — PAIN DESCRIPTION - ORIENTATION
ORIENTATION: LEFT
ORIENTATION: LEFT

## 2019-11-19 ASSESSMENT — PAIN DESCRIPTION - LOCATION
LOCATION: CHEST
LOCATION: CHEST

## 2019-11-19 ASSESSMENT — HEART SCORE: ECG: 0

## 2019-11-19 ASSESSMENT — PAIN SCALES - GENERAL
PAINLEVEL_OUTOF10: 6
PAINLEVEL_OUTOF10: 7

## 2019-11-19 ASSESSMENT — PAIN DESCRIPTION - FREQUENCY
FREQUENCY: CONTINUOUS
FREQUENCY: CONTINUOUS

## 2019-11-19 ASSESSMENT — PAIN DESCRIPTION - DESCRIPTORS
DESCRIPTORS: ACHING
DESCRIPTORS: ACHING

## 2019-11-19 ASSESSMENT — PAIN DESCRIPTION - PAIN TYPE
TYPE: ACUTE PAIN
TYPE: ACUTE PAIN

## 2019-11-20 ENCOUNTER — HOSPITAL ENCOUNTER (OUTPATIENT)
Dept: CARDIAC CATH/INVASIVE PROCEDURES | Age: 54
Discharge: HOME | End: 2019-11-20
Payer: COMMERCIAL

## 2019-11-20 VITALS
DIASTOLIC BLOOD PRESSURE: 98 MMHG | HEART RATE: 78 BPM | OXYGEN SATURATION: 94 % | RESPIRATION RATE: 18 BRPM | SYSTOLIC BLOOD PRESSURE: 155 MMHG

## 2019-11-20 PROBLEM — I25.119 CORONARY ARTERY DISEASE INVOLVING NATIVE CORONARY ARTERY WITH ANGINA PECTORIS (HCC): Status: ACTIVE | Noted: 2019-11-20

## 2019-11-20 LAB
ALBUMIN SERPL-MCNC: 3.5 G/DL (ref 3.5–5.2)
ALBUMIN/GLOBULIN RATIO: ABNORMAL (ref 1–2.5)
ALP BLD-CCNC: 78 U/L (ref 40–129)
ALT SERPL-CCNC: 18 U/L (ref 5–41)
ANION GAP SERPL CALCULATED.3IONS-SCNC: 12 MMOL/L (ref 9–17)
AST SERPL-CCNC: 17 U/L
BILIRUB SERPL-MCNC: 0.3 MG/DL (ref 0.3–1.2)
BNP INTERPRETATION: NORMAL
BUN BLDV-MCNC: 15 MG/DL (ref 6–20)
BUN/CREAT BLD: ABNORMAL (ref 9–20)
CALCIUM SERPL-MCNC: 8.9 MG/DL (ref 8.6–10.4)
CHLORIDE BLD-SCNC: 106 MMOL/L (ref 98–107)
CHOLESTEROL/HDL RATIO: 7
CHOLESTEROL: 175 MG/DL
CO2: 24 MMOL/L (ref 20–31)
CREAT SERPL-MCNC: 0.78 MG/DL (ref 0.7–1.2)
EKG ATRIAL RATE: 93 BPM
EKG P AXIS: 55 DEGREES
EKG P-R INTERVAL: 194 MS
EKG Q-T INTERVAL: 392 MS
EKG QRS DURATION: 98 MS
EKG QTC CALCULATION (BAZETT): 487 MS
EKG R AXIS: 107 DEGREES
EKG T AXIS: 77 DEGREES
EKG VENTRICULAR RATE: 93 BPM
GFR AFRICAN AMERICAN: >60 ML/MIN
GFR NON-AFRICAN AMERICAN: >60 ML/MIN
GFR SERPL CREATININE-BSD FRML MDRD: ABNORMAL ML/MIN/{1.73_M2}
GFR SERPL CREATININE-BSD FRML MDRD: ABNORMAL ML/MIN/{1.73_M2}
GLUCOSE BLD-MCNC: 99 MG/DL (ref 70–99)
HCT VFR BLD CALC: 39.3 % (ref 41–53)
HDLC SERPL-MCNC: 25 MG/DL
HEMOGLOBIN: 13.4 G/DL (ref 13.5–17.5)
LDL CHOLESTEROL: 101 MG/DL (ref 0–130)
LV EF: 63 %
LVEF MODALITY: NORMAL
MAGNESIUM: 2.4 MG/DL (ref 1.6–2.6)
MCH RBC QN AUTO: 28.8 PG (ref 26–34)
MCHC RBC AUTO-ENTMCNC: 34 G/DL (ref 31–37)
MCV RBC AUTO: 84.7 FL (ref 80–100)
NRBC AUTOMATED: ABNORMAL PER 100 WBC
PDW BLD-RTO: 13.7 % (ref 11.5–14.9)
PLATELET # BLD: 222 K/UL (ref 150–450)
PMV BLD AUTO: 8.4 FL (ref 6–12)
POTASSIUM SERPL-SCNC: 4 MMOL/L (ref 3.7–5.3)
PRO-BNP: 60 PG/ML
RBC # BLD: 4.64 M/UL (ref 4.5–5.9)
SODIUM BLD-SCNC: 142 MMOL/L (ref 135–144)
TOTAL PROTEIN: 6.3 G/DL (ref 6.4–8.3)
TRIGL SERPL-MCNC: 246 MG/DL
TROPONIN INTERP: ABNORMAL
TROPONIN T: ABNORMAL NG/ML
TROPONIN, HIGH SENSITIVITY: 37 NG/L (ref 0–22)
TSH SERPL DL<=0.05 MIU/L-ACNC: 2.91 MIU/L (ref 0.3–5)
VLDLC SERPL CALC-MCNC: ABNORMAL MG/DL (ref 1–30)
WBC # BLD: 8.9 K/UL (ref 3.5–11)

## 2019-11-20 PROCEDURE — 84443 ASSAY THYROID STIM HORMONE: CPT

## 2019-11-20 PROCEDURE — 80053 COMPREHEN METABOLIC PANEL: CPT

## 2019-11-20 PROCEDURE — C1894 INTRO/SHEATH, NON-LASER: HCPCS

## 2019-11-20 PROCEDURE — 7100000000 HC PACU RECOVERY - FIRST 15 MIN

## 2019-11-20 PROCEDURE — 6360000004 HC RX CONTRAST MEDICATION

## 2019-11-20 PROCEDURE — 94660 CPAP INITIATION&MGMT: CPT

## 2019-11-20 PROCEDURE — 93306 TTE W/DOPPLER COMPLETE: CPT

## 2019-11-20 PROCEDURE — 2060000000 HC ICU INTERMEDIATE R&B

## 2019-11-20 PROCEDURE — 6370000000 HC RX 637 (ALT 250 FOR IP): Performed by: NURSE PRACTITIONER

## 2019-11-20 PROCEDURE — C1725 CATH, TRANSLUMIN NON-LASER: HCPCS

## 2019-11-20 PROCEDURE — C1769 GUIDE WIRE: HCPCS

## 2019-11-20 PROCEDURE — 80061 LIPID PANEL: CPT

## 2019-11-20 PROCEDURE — 2500000003 HC RX 250 WO HCPCS

## 2019-11-20 PROCEDURE — 85027 COMPLETE CBC AUTOMATED: CPT

## 2019-11-20 PROCEDURE — 83880 ASSAY OF NATRIURETIC PEPTIDE: CPT

## 2019-11-20 PROCEDURE — 7100000001 HC PACU RECOVERY - ADDTL 15 MIN

## 2019-11-20 PROCEDURE — 96376 TX/PRO/DX INJ SAME DRUG ADON: CPT

## 2019-11-20 PROCEDURE — 96372 THER/PROPH/DIAG INJ SC/IM: CPT

## 2019-11-20 PROCEDURE — 96375 TX/PRO/DX INJ NEW DRUG ADDON: CPT

## 2019-11-20 PROCEDURE — 2580000003 HC RX 258: Performed by: NURSE PRACTITIONER

## 2019-11-20 PROCEDURE — 36415 COLL VENOUS BLD VENIPUNCTURE: CPT

## 2019-11-20 PROCEDURE — 2709999900 HC NON-CHARGEABLE SUPPLY

## 2019-11-20 PROCEDURE — 93458 L HRT ARTERY/VENTRICLE ANGIO: CPT | Performed by: INTERNAL MEDICINE

## 2019-11-20 PROCEDURE — 84484 ASSAY OF TROPONIN QUANT: CPT

## 2019-11-20 PROCEDURE — 6360000002 HC RX W HCPCS: Performed by: NURSE PRACTITIONER

## 2019-11-20 PROCEDURE — 83735 ASSAY OF MAGNESIUM: CPT

## 2019-11-20 PROCEDURE — 99223 1ST HOSP IP/OBS HIGH 75: CPT | Performed by: INTERNAL MEDICINE

## 2019-11-20 PROCEDURE — 6360000002 HC RX W HCPCS

## 2019-11-20 RX ORDER — SODIUM CHLORIDE 0.9 % (FLUSH) 0.9 %
10 SYRINGE (ML) INJECTION EVERY 12 HOURS SCHEDULED
Status: CANCELLED | OUTPATIENT
Start: 2019-11-20

## 2019-11-20 RX ORDER — SODIUM CHLORIDE 0.9 % (FLUSH) 0.9 %
10 SYRINGE (ML) INJECTION PRN
Status: DISCONTINUED | OUTPATIENT
Start: 2019-11-20 | End: 2019-11-21 | Stop reason: HOSPADM

## 2019-11-20 RX ORDER — SODIUM CHLORIDE 9 MG/ML
INJECTION, SOLUTION INTRAVENOUS CONTINUOUS
Status: DISCONTINUED | OUTPATIENT
Start: 2019-11-20 | End: 2019-11-21 | Stop reason: HOSPADM

## 2019-11-20 RX ORDER — ACETAMINOPHEN 325 MG/1
650 TABLET ORAL EVERY 4 HOURS PRN
Status: CANCELLED | OUTPATIENT
Start: 2019-11-20

## 2019-11-20 RX ORDER — ASPIRIN 325 MG
325 TABLET ORAL DAILY
COMMUNITY
End: 2021-02-17 | Stop reason: SDUPTHER

## 2019-11-20 RX ORDER — SODIUM CHLORIDE 0.9 % (FLUSH) 0.9 %
10 SYRINGE (ML) INJECTION EVERY 12 HOURS SCHEDULED
Status: DISCONTINUED | OUTPATIENT
Start: 2019-11-20 | End: 2019-11-21 | Stop reason: HOSPADM

## 2019-11-20 RX ORDER — MORPHINE SULFATE 4 MG/ML
4 INJECTION, SOLUTION INTRAMUSCULAR; INTRAVENOUS ONCE
Status: COMPLETED | OUTPATIENT
Start: 2019-11-20 | End: 2019-11-20

## 2019-11-20 RX ORDER — ATORVASTATIN CALCIUM 40 MG/1
40 TABLET, FILM COATED ORAL DAILY
COMMUNITY
End: 2021-02-17 | Stop reason: SDUPTHER

## 2019-11-20 RX ORDER — SODIUM CHLORIDE 0.9 % (FLUSH) 0.9 %
10 SYRINGE (ML) INJECTION PRN
Status: CANCELLED | OUTPATIENT
Start: 2019-11-20

## 2019-11-20 RX ADMIN — ENOXAPARIN SODIUM 160 MG: 100 INJECTION SUBCUTANEOUS at 20:56

## 2019-11-20 RX ADMIN — METOPROLOL TARTRATE 25 MG: 25 TABLET ORAL at 20:56

## 2019-11-20 RX ADMIN — LORAZEPAM 1 MG: 2 INJECTION INTRAMUSCULAR; INTRAVENOUS at 00:59

## 2019-11-20 RX ADMIN — Medication 10 ML: at 20:57

## 2019-11-20 RX ADMIN — PANTOPRAZOLE SODIUM 40 MG: 40 TABLET, DELAYED RELEASE ORAL at 08:11

## 2019-11-20 RX ADMIN — MORPHINE SULFATE 4 MG: 4 INJECTION, SOLUTION INTRAMUSCULAR; INTRAVENOUS at 02:25

## 2019-11-20 RX ADMIN — METOPROLOL TARTRATE 25 MG: 25 TABLET ORAL at 08:11

## 2019-11-20 RX ADMIN — METOPROLOL TARTRATE 25 MG: 25 TABLET ORAL at 00:24

## 2019-11-20 RX ADMIN — AMLODIPINE BESYLATE 10 MG: 10 TABLET ORAL at 08:11

## 2019-11-20 RX ADMIN — ATORVASTATIN CALCIUM 40 MG: 40 TABLET, FILM COATED ORAL at 20:56

## 2019-11-20 RX ADMIN — ENOXAPARIN SODIUM 160 MG: 100 INJECTION SUBCUTANEOUS at 08:19

## 2019-11-20 RX ADMIN — ATORVASTATIN CALCIUM 40 MG: 40 TABLET, FILM COATED ORAL at 00:24

## 2019-11-20 RX ADMIN — ASPIRIN 325 MG: 325 TABLET, DELAYED RELEASE ORAL at 08:11

## 2019-11-20 ASSESSMENT — ENCOUNTER SYMPTOMS
COUGH: 0
NAUSEA: 0
SHORTNESS OF BREATH: 1
BACK PAIN: 0
WHEEZING: 0
ABDOMINAL PAIN: 0
DIARRHEA: 0
VOMITING: 0
SORE THROAT: 0
CONSTIPATION: 0

## 2019-11-20 ASSESSMENT — PAIN SCALES - GENERAL
PAINLEVEL_OUTOF10: 7
PAINLEVEL_OUTOF10: 0
PAINLEVEL_OUTOF10: 6
PAINLEVEL_OUTOF10: 8
PAINLEVEL_OUTOF10: 0

## 2019-11-20 ASSESSMENT — PAIN DESCRIPTION - DESCRIPTORS: DESCRIPTORS: SHARP

## 2019-11-20 ASSESSMENT — PAIN DESCRIPTION - PAIN TYPE: TYPE: ACUTE PAIN

## 2019-11-20 ASSESSMENT — PAIN DESCRIPTION - LOCATION
LOCATION: CHEST
LOCATION: CHEST

## 2019-11-20 ASSESSMENT — PAIN DESCRIPTION - FREQUENCY
FREQUENCY: CONTINUOUS
FREQUENCY: CONTINUOUS

## 2019-11-21 VITALS
HEART RATE: 77 BPM | OXYGEN SATURATION: 98 % | RESPIRATION RATE: 18 BRPM | BODY MASS INDEX: 46.65 KG/M2 | DIASTOLIC BLOOD PRESSURE: 104 MMHG | WEIGHT: 315 LBS | TEMPERATURE: 97.9 F | SYSTOLIC BLOOD PRESSURE: 158 MMHG | HEIGHT: 69 IN

## 2019-11-21 LAB
ALBUMIN SERPL-MCNC: 3.9 G/DL (ref 3.5–5.2)
ALBUMIN/GLOBULIN RATIO: ABNORMAL (ref 1–2.5)
ALP BLD-CCNC: 85 U/L (ref 40–129)
ALT SERPL-CCNC: 19 U/L (ref 5–41)
ANION GAP SERPL CALCULATED.3IONS-SCNC: 14 MMOL/L (ref 9–17)
AST SERPL-CCNC: 15 U/L
BILIRUB SERPL-MCNC: 0.41 MG/DL (ref 0.3–1.2)
BUN BLDV-MCNC: 12 MG/DL (ref 6–20)
BUN/CREAT BLD: ABNORMAL (ref 9–20)
CALCIUM SERPL-MCNC: 9.4 MG/DL (ref 8.6–10.4)
CHLORIDE BLD-SCNC: 101 MMOL/L (ref 98–107)
CO2: 23 MMOL/L (ref 20–31)
CREAT SERPL-MCNC: 0.82 MG/DL (ref 0.7–1.2)
GFR AFRICAN AMERICAN: >60 ML/MIN
GFR NON-AFRICAN AMERICAN: >60 ML/MIN
GFR SERPL CREATININE-BSD FRML MDRD: ABNORMAL ML/MIN/{1.73_M2}
GFR SERPL CREATININE-BSD FRML MDRD: ABNORMAL ML/MIN/{1.73_M2}
GLUCOSE BLD-MCNC: 114 MG/DL (ref 70–99)
HCT VFR BLD CALC: 44.3 % (ref 41–53)
HEMOGLOBIN: 15 G/DL (ref 13.5–17.5)
MCH RBC QN AUTO: 28.4 PG (ref 26–34)
MCHC RBC AUTO-ENTMCNC: 33.8 G/DL (ref 31–37)
MCV RBC AUTO: 84.1 FL (ref 80–100)
NRBC AUTOMATED: NORMAL PER 100 WBC
PDW BLD-RTO: 13.6 % (ref 11.5–14.9)
PLATELET # BLD: 229 K/UL (ref 150–450)
PMV BLD AUTO: 8.3 FL (ref 6–12)
POTASSIUM SERPL-SCNC: 4.1 MMOL/L (ref 3.7–5.3)
RBC # BLD: 5.27 M/UL (ref 4.5–5.9)
SODIUM BLD-SCNC: 138 MMOL/L (ref 135–144)
TOTAL PROTEIN: 7.3 G/DL (ref 6.4–8.3)
WBC # BLD: 10.4 K/UL (ref 3.5–11)

## 2019-11-21 PROCEDURE — 94761 N-INVAS EAR/PLS OXIMETRY MLT: CPT

## 2019-11-21 PROCEDURE — 6360000002 HC RX W HCPCS: Performed by: NURSE PRACTITIONER

## 2019-11-21 PROCEDURE — 94660 CPAP INITIATION&MGMT: CPT

## 2019-11-21 PROCEDURE — 85027 COMPLETE CBC AUTOMATED: CPT

## 2019-11-21 PROCEDURE — 36415 COLL VENOUS BLD VENIPUNCTURE: CPT

## 2019-11-21 PROCEDURE — 80053 COMPREHEN METABOLIC PANEL: CPT

## 2019-11-21 PROCEDURE — 6370000000 HC RX 637 (ALT 250 FOR IP): Performed by: NURSE PRACTITIONER

## 2019-11-21 PROCEDURE — 99233 SBSQ HOSP IP/OBS HIGH 50: CPT | Performed by: INTERNAL MEDICINE

## 2019-11-21 PROCEDURE — 2580000003 HC RX 258: Performed by: NURSE PRACTITIONER

## 2019-11-21 RX ORDER — AMLODIPINE BESYLATE 10 MG/1
10 TABLET ORAL DAILY
Qty: 30 TABLET | Refills: 3 | Status: SHIPPED | OUTPATIENT
Start: 2019-11-21 | End: 2021-02-17 | Stop reason: SDUPTHER

## 2019-11-21 RX ORDER — NITROGLYCERIN 0.4 MG/1
TABLET SUBLINGUAL
Qty: 25 TABLET | Refills: 3 | Status: SHIPPED | OUTPATIENT
Start: 2019-11-21

## 2019-11-21 RX ADMIN — Medication 10 ML: at 09:14

## 2019-11-21 RX ADMIN — METOPROLOL TARTRATE 25 MG: 25 TABLET ORAL at 09:07

## 2019-11-21 RX ADMIN — ASPIRIN 325 MG: 325 TABLET, DELAYED RELEASE ORAL at 09:07

## 2019-11-21 RX ADMIN — AMLODIPINE BESYLATE 10 MG: 10 TABLET ORAL at 09:07

## 2019-11-21 RX ADMIN — PANTOPRAZOLE SODIUM 40 MG: 40 TABLET, DELAYED RELEASE ORAL at 06:22

## 2019-11-21 RX ADMIN — ENOXAPARIN SODIUM 160 MG: 100 INJECTION SUBCUTANEOUS at 09:07

## 2019-11-23 LAB
EKG ATRIAL RATE: 84 BPM
EKG P AXIS: 46 DEGREES
EKG P-R INTERVAL: 194 MS
EKG Q-T INTERVAL: 400 MS
EKG QRS DURATION: 98 MS
EKG QTC CALCULATION (BAZETT): 472 MS
EKG R AXIS: -35 DEGREES
EKG T AXIS: 78 DEGREES
EKG VENTRICULAR RATE: 84 BPM

## 2021-02-12 ENCOUNTER — NURSE TRIAGE (OUTPATIENT)
Dept: OTHER | Facility: CLINIC | Age: 56
End: 2021-02-12

## 2021-02-12 NOTE — TELEPHONE ENCOUNTER
Patient called Lindsay Alejandre  at [facility] pre-service center [facility])  with red flag complaint. Brief description of triage: see below. Triage indicates for patient to be seen in the office today or tomorrow. Patient states that this is not possible. He has his dad to watch and he doesn't have any money to pay his copay until next week. Care advice provided, patient verbalizes understanding; denies any other questions or concerns; instructed to call back for any new or worsening symptoms. Attention Provider: Thank you for allowing me to participate in the care of your patient. The patient was connected to triage in response to information provided to the [facility]. Please do not respond through this encounter as the response is not directed to a shared pool. Reason for Disposition   Patient wants to be seen    Answer Assessment - Initial Assessment Questions  1. LOCATION and RADIATION: \"Where is the pain located? \"       Right hip and radiates into his thigh and knee. 2. QUALITY: \"What does the pain feel like? \"  (e.g., sharp, dull, aching, burning)     Aches and sharp stabbing pain in his hip. 3. SEVERITY: \"How bad is the pain? \" \"What does it keep you from doing? \"   (Scale 1-10; or mild, moderate, severe)    -  MILD (1-3): doesn't interfere with normal activities     -  MODERATE (4-7): interferes with normal activities (e.g., work or school) or awakens from sleep, limping     -  SEVERE (8-10): excruciating pain, unable to do any normal activities, unable to walk      7    4. ONSET: \"When did the pain start? \" \"Does it come and go, or is it there all the time? \"     1 year ago. Pain is there all the time. 5. WORK OR EXERCISE: \"Has there been any recent work or exercise that involved this part of the body? \"       No     6. CAUSE: \"What do you think is causing the hip pain? \"       Thinks his hip is shot. 7. AGGRAVATING FACTORS: \"What makes the hip pain worse? \" (e.g., walking, climbing stairs, running)      Everything makes it worse. Hurts when he is sitting and when he is standing. 8. OTHER SYMPTOMS: \"Do you have any other symptoms? \" (e.g., back pain, pain shooting down leg,  fever, rash)     Pain shoots down his leg.     Protocols used: HIP PAIN-ADULT-[place]

## 2021-02-16 ENCOUNTER — TELEPHONE (OUTPATIENT)
Dept: INTERNAL MEDICINE CLINIC | Age: 56
End: 2021-02-16

## 2021-02-17 ENCOUNTER — OFFICE VISIT (OUTPATIENT)
Dept: INTERNAL MEDICINE CLINIC | Age: 56
End: 2021-02-17
Payer: COMMERCIAL

## 2021-02-17 ENCOUNTER — HOSPITAL ENCOUNTER (OUTPATIENT)
Dept: GENERAL RADIOLOGY | Facility: CLINIC | Age: 56
Discharge: HOME | End: 2021-02-19
Payer: COMMERCIAL

## 2021-02-17 ENCOUNTER — HOSPITAL ENCOUNTER (OUTPATIENT)
Facility: CLINIC | Age: 56
Discharge: HOME | End: 2021-02-19
Payer: COMMERCIAL

## 2021-02-17 VITALS
OXYGEN SATURATION: 97 % | DIASTOLIC BLOOD PRESSURE: 102 MMHG | TEMPERATURE: 96.8 F | HEART RATE: 95 BPM | BODY MASS INDEX: 46.65 KG/M2 | HEIGHT: 69 IN | WEIGHT: 315 LBS | SYSTOLIC BLOOD PRESSURE: 160 MMHG

## 2021-02-17 DIAGNOSIS — M25.551 CHRONIC RIGHT HIP PAIN: ICD-10-CM

## 2021-02-17 DIAGNOSIS — I10 ESSENTIAL HYPERTENSION: Primary | ICD-10-CM

## 2021-02-17 DIAGNOSIS — G89.29 CHRONIC RIGHT HIP PAIN: ICD-10-CM

## 2021-02-17 DIAGNOSIS — I25.119 CORONARY ARTERY DISEASE INVOLVING NATIVE CORONARY ARTERY WITH ANGINA PECTORIS, UNSPECIFIED WHETHER NATIVE OR TRANSPLANTED HEART (HCC): ICD-10-CM

## 2021-02-17 DIAGNOSIS — E78.5 HYPERLIPIDEMIA, UNSPECIFIED HYPERLIPIDEMIA TYPE: ICD-10-CM

## 2021-02-17 DIAGNOSIS — G47.33 OSA (OBSTRUCTIVE SLEEP APNEA): ICD-10-CM

## 2021-02-17 DIAGNOSIS — E66.01 MORBIDLY OBESE (HCC): ICD-10-CM

## 2021-02-17 PROCEDURE — G8427 DOCREV CUR MEDS BY ELIG CLIN: HCPCS | Performed by: NURSE PRACTITIONER

## 2021-02-17 PROCEDURE — G8484 FLU IMMUNIZE NO ADMIN: HCPCS | Performed by: NURSE PRACTITIONER

## 2021-02-17 PROCEDURE — 73502 X-RAY EXAM HIP UNI 2-3 VIEWS: CPT

## 2021-02-17 PROCEDURE — G8417 CALC BMI ABV UP PARAM F/U: HCPCS | Performed by: NURSE PRACTITIONER

## 2021-02-17 PROCEDURE — 4004F PT TOBACCO SCREEN RCVD TLK: CPT | Performed by: NURSE PRACTITIONER

## 2021-02-17 PROCEDURE — 99214 OFFICE O/P EST MOD 30 MIN: CPT | Performed by: NURSE PRACTITIONER

## 2021-02-17 PROCEDURE — 3017F COLORECTAL CA SCREEN DOC REV: CPT | Performed by: NURSE PRACTITIONER

## 2021-02-17 RX ORDER — BLOOD PRESSURE TEST KIT
1 KIT MISCELLANEOUS DAILY
Qty: 1 KIT | Refills: 0 | Status: SHIPPED | OUTPATIENT
Start: 2021-02-17

## 2021-02-17 RX ORDER — TRAMADOL HYDROCHLORIDE 50 MG/1
50 TABLET ORAL EVERY 6 HOURS PRN
Qty: 20 TABLET | Refills: 0 | Status: SHIPPED | OUTPATIENT
Start: 2021-02-17 | End: 2021-02-22

## 2021-02-17 RX ORDER — ASPIRIN 325 MG
325 TABLET ORAL DAILY
Qty: 30 TABLET | Refills: 3 | Status: SHIPPED | OUTPATIENT
Start: 2021-02-17

## 2021-02-17 RX ORDER — ATORVASTATIN CALCIUM 40 MG/1
40 TABLET, FILM COATED ORAL DAILY
Qty: 30 TABLET | Refills: 3 | Status: SHIPPED | OUTPATIENT
Start: 2021-02-17 | End: 2021-06-07

## 2021-02-17 RX ORDER — ACETAMINOPHEN 500 MG
1000 TABLET ORAL 3 TIMES DAILY
Qty: 540 TABLET | Refills: 1 | Status: SHIPPED | OUTPATIENT
Start: 2021-02-17

## 2021-02-17 RX ORDER — PREDNISONE 20 MG/1
20 TABLET ORAL DAILY
Qty: 7 TABLET | Refills: 0 | Status: SHIPPED | OUTPATIENT
Start: 2021-02-17 | End: 2021-02-17

## 2021-02-17 RX ORDER — AMLODIPINE BESYLATE 10 MG/1
10 TABLET ORAL DAILY
Qty: 30 TABLET | Refills: 3 | Status: SHIPPED | OUTPATIENT
Start: 2021-02-17 | End: 2021-06-07

## 2021-02-17 ASSESSMENT — ENCOUNTER SYMPTOMS
BLOOD IN STOOL: 0
VOMITING: 0
RHINORRHEA: 0
DIARRHEA: 0
SORE THROAT: 0
COUGH: 0
SHORTNESS OF BREATH: 0
CHEST TIGHTNESS: 0
CONSTIPATION: 0
TROUBLE SWALLOWING: 0
SINUS PAIN: 0
NAUSEA: 0
ABDOMINAL PAIN: 0
WHEEZING: 0

## 2021-02-17 ASSESSMENT — PATIENT HEALTH QUESTIONNAIRE - PHQ9: DEPRESSION UNABLE TO ASSESS: PT REFUSES

## 2021-02-17 NOTE — PROGRESS NOTES
Visit Information    Have you changed or started any medications since your last visit including any over-the-counter medicines, vitamins, or herbal medicines? no   Are you having any side effects from any of your medications? -  no  Have you stopped taking any of your medications? Is so, why? -  no    Have you seen any other physician or provider since your last visit? No  Have you had any other diagnostic tests since your last visit? No  Have you been seen in the emergency room and/or had an admission to a hospital since we last saw you? No  Have you had your routine dental cleaning in the past 6 months? no    Have you activated your [employer] account? If not, what are your barriers? No: No access.       Patient Care Team:  Lavern Bland MD as PCP - General (Internal Medicine)    Medical History Review  Past Medical, Family, and Social History reviewed and does contribute to the patient presenting condition    Health Maintenance   Topic Date Due    Hepatitis C screen  1965    Pneumococcal 0-64 years Vaccine (1 of 1 - PPSV23) 11/30/1973    HIV screen  11/30/1982    DTaP/Tdap/Td vaccine (1 - Tdap) 11/30/1986    Shingles Vaccine (1 of 2) 11/30/2017    Colon cancer screen colonoscopy  11/30/2017    Flu vaccine (1) 09/01/2020    Lipid screen  11/20/2020    Diabetes screen  03/09/2022    Hepatitis A vaccine  Aged Out    Hepatitis B vaccine  Aged Out    Hib vaccine  Aged Out    Meningococcal (ACWY) vaccine  Aged Out         [address] [address]  Dept: [phone]  Dept Fax: [phone]    OfficeProgress/Follow Up Note  Date of patient's visit: 2/17/2021  Patient's Name:  Channing Jenkins YOB: 1965            Patient Care Team:  Lavern Bland MD as PCP - General (Internal Medicine)    REASON FOR VISIT:  Routine outpatient follow up    HISTORY OF PRESENT ILLNESS:      Chief Complaint   Patient presents with    Hip Pain R hip    Sleep Apnea     sleep apnea supplies        History was obtained from the patient. Rosey Flynn is a 50 y.o. male here today for chronic medical problem follow up and chronic right hip pain. R hip pain   Chronic over the past year   Reports pain worsening as time goes on  Aggravated with sitting and moving  Denies alleviating factors, or injury or trauma to right hip  Impaired gait due to pain    Patient Active Problem List   Diagnosis    Morbidly obese ([facility] [address]    GERD (gastroesophageal reflux disease)    HLD (hyperlipidemia)    HTN (hypertension)    KVNG (obstructive sleep apnea)    Anaphylactic reaction    Angioedema    Tongue swelling    Septic arthritis of ankle (HCC)    Septic arthritis of left ankle (HCC)    Acute idiopathic gout of left ankle    Chest pain    NSTEMI (non-ST elevated myocardial infarction) ([facility] [address]    Tobacco abuse    Coronary artery disease involving native coronary artery with angina pectoris [facility])       Health Maintenance Due   Topic Date Due    Hepatitis C screen  1965    Pneumococcal 0-64 years Vaccine (1 of 1 - PPSV23) 11/30/1973    HIV screen  11/30/1982    DTaP/Tdap/Td vaccine (1 - Tdap) 11/30/1986    Shingles Vaccine (1 of 2) 11/30/2017    Colon cancer screen colonoscopy  11/30/2017    Flu vaccine (1) 09/01/2020    Lipid screen  11/20/2020       MEDICATIONS:      Current Outpatient Medications   Medication Sig Dispense Refill    amLODIPine (NORVASC) 10 MG tablet Take 1 tablet by mouth daily 30 tablet 3    atorvastatin (LIPITOR) 40 MG tablet Take 1 tablet by mouth daily 30 tablet 3    aspirin 325 MG tablet Take 1 tablet by mouth daily 30 tablet 3    metoprolol tartrate (LOPRESSOR) 25 MG tablet Take 1 tablet by mouth 2 times daily 60 tablet 3    traMADol (ULTRAM) 50 MG tablet Take 1 tablet by mouth every 6 hours as needed for Pain for up to 5 days. Intended supply: 5 days.  Take lowest dose possible to manage pain 20 tablet 0  acetaminophen (TYLENOL) 500 MG tablet Take 2 tablets by mouth 3 times daily 540 tablet 1    lansoprazole (PREVACID) 15 MG delayed release capsule Take 30 mg by mouth daily Taking OTC medication      nitroGLYCERIN (NITROSTAT) 0.4 MG SL tablet up to max of 3 total doses. If no relief after 1 dose, call 911. (Patient not taking: Reported on 2/17/2021) 25 tablet 3     No current facility-administered medications for this visit. ALLERGIES:      Allergies   Allergen Reactions    Lisinopril Anaphylaxis         SOCIAL HISTORY    Reviewed and no change from previous record. Gayle Clark  reports that he has been smoking cigars. He has never used smokeless tobacco.    FAMILY HISTORY:    Reviewed and No change from previous visit    REVIEW OF SYSTEMS:    Review of Systems   Constitutional: Negative for appetite change, diaphoresis, fatigue, fever and unexpected weight change. HENT: Negative for ear pain, postnasal drip, rhinorrhea, sinus pain, sore throat and trouble swallowing. Eyes: Negative for visual disturbance. Respiratory: Negative for cough, chest tightness, shortness of breath and wheezing. Cardiovascular: Negative for chest pain, palpitations and leg swelling. Gastrointestinal: Negative for abdominal pain, blood in stool, constipation, diarrhea, nausea and vomiting. Endocrine: Negative for polydipsia, polyphagia and polyuria. Genitourinary: Negative for difficulty urinating, dysuria and flank pain. Musculoskeletal: Positive for arthralgias (R hip pain) and gait problem. Negative for myalgias. Skin: Negative for rash and wound. Neurological: Negative for dizziness, syncope and weakness. Psychiatric/Behavioral: Positive for sleep disturbance (due to pain). All other systems reviewed and are negative.       PHYSICAL EXAM:      Vitals:    02/17/21 1426 02/17/21 1435   BP: (!) 156/98 (!) 160/102   Pulse: 95    Temp: 96.8 °F (36 °C)    SpO2: 97%    Weight: (!) 351 lb (159.2 kg) Height: 5' 9\" (1.753 m)      BP Readings from Last 3 Encounters:   02/17/21 (!) 160/102   11/21/19 (!) 158/104   11/20/19 (!) 155/98      Wt Readings from Last 3 Encounters:   02/17/21 (!) 351 lb (159.2 kg)   11/19/19 (!) 350 lb (158.8 kg)   03/08/19 (!) 350 lb 1.5 oz (158.8 kg)       Physical Exam  Vitals signs reviewed. Constitutional:       Appearance: Normal appearance. He is obese. HENT:      Head: Normocephalic. Cardiovascular:      Rate and Rhythm: Normal rate and regular rhythm. Pulses: Normal pulses. Heart sounds: Normal heart sounds. Pulmonary:      Effort: Pulmonary effort is normal.      Breath sounds: Normal breath sounds. Abdominal:      General: Bowel sounds are normal.      Palpations: Abdomen is soft. Musculoskeletal:         General: No swelling or deformity. Right hip: He exhibits decreased range of motion, decreased strength and tenderness. Skin:     General: Skin is warm and dry. Neurological:      General: No focal deficit present. Mental Status: He is alert and oriented to person, place, and time. Psychiatric:         Mood and Affect: Mood normal.         Behavior: Behavior normal.         Thought Content: Thought content normal.         Judgment: Judgment normal.          LABORATORY FINDINGS:    CBC:  Lab Results   Component Value Date    WBC 10.4 11/21/2019    HGB 15.0 11/21/2019     11/21/2019       BMP:    Lab Results   Component Value Date     11/21/2019    K 4.1 11/21/2019     11/21/2019    CO2 23 11/21/2019    BUN 12 11/21/2019    CREATININE 0.82 11/21/2019    GLUCOSE 114 11/21/2019       HEMOGLOBIN A1C:   Lab Results   Component Value Date    LABA1C 5.1 03/09/2019       FASTING LIPID PANEL:  Lab Results   Component Value Date    CHOL 175 11/20/2019    HDL 25 (L) 11/20/2019    TRIG 246 (H) 11/20/2019       ASSESSMENT AND PLAN:      1.  Essential hypertension -uncontrolled, patient has been noncompliant with medication regimen x 1 year  - amLODIPine (NORVASC) 10 MG tablet; Take 1 tablet by mouth daily  Dispense: 30 tablet; Refill: 3  - atorvastatin (LIPITOR) 40 MG tablet; Take 1 tablet by mouth daily  Dispense: 30 tablet; Refill: 3  - aspirin 325 MG tablet; Take 1 tablet by mouth daily  Dispense: 30 tablet; Refill: 3  - metoprolol tartrate (LOPRESSOR) 25 MG tablet; Take 1 tablet by mouth 2 times daily  Dispense: 60 tablet; Refill: 3  - CBC Auto Differential; Future  - Comprehensive Metabolic Panel; Future    2. Chronic right hip pain  - XR HIP RIGHT (2-3 VIEWS); Future  - Anaid Banda MD, Orthopedic Surgery, [place]  - traMADol (ULTRAM) 50 MG tablet; Take 1 tablet by mouth every 6 hours as needed for Pain for up to 5 days. Intended supply: 5 days. Take lowest dose possible to manage pain  Dispense: 20 tablet; Refill: 0  - acetaminophen (TYLENOL) 500 MG tablet; Take 2 tablets by mouth 3 times daily  Dispense: 540 tablet; Refill: 1  -encouraged supportive care measures (RICE)    3. Coronary artery disease involving native coronary artery with angina pectoris, unspecified whether native or transplanted heart ([facility] [address]  -encouraged blood pressure control, with metoprolol and amlodipine  -resume ASA and Lipitor    4. Morbidly obese ([facility] [address]  - Lipid Panel; Future  - TSH without Reflex; Future  - Hemoglobin A1C; Future    5. Hyperlipidemia, unspecified hyperlipidemia type  -continue Lipitor  - Lipid Panel; Future    6. KVNG (obstructive sleep apnea)  - DME Order for (Specify) as OP  -patient needed CPAP supplies re ordered    FOLLOW UP AND INSTRUCTIONS:   Return in about 2 weeks (around 3/3/2021) for BP recheck.     Clara Montanaarik received counseling on the following healthy behaviors: nutrition, exercise and medication adherence Discussed use, benefit, and side effects of prescribed medications. Barriers to medication compliance addressed. All patient questions answered. Patient voiced understanding. Patient given educational materials - see patient instructions    GILES Tena - STEPAN JUAREZ [facility]  2/17/2021, 3:14 PM    Please note that this chart was generated using voice recognition Dragon dictation software. Although everyeffort was made to ensure the accuracy of this automated transcription, some errors in transcription may have occurred.

## 2021-02-18 ENCOUNTER — HOSPITAL ENCOUNTER (OUTPATIENT)
Age: 56
Setting detail: SPECIMEN
Discharge: HOME | End: 2021-02-18
Payer: COMMERCIAL

## 2021-02-18 DIAGNOSIS — E78.5 HYPERLIPIDEMIA, UNSPECIFIED HYPERLIPIDEMIA TYPE: ICD-10-CM

## 2021-02-18 DIAGNOSIS — I10 ESSENTIAL HYPERTENSION: ICD-10-CM

## 2021-02-18 DIAGNOSIS — E66.01 MORBIDLY OBESE (HCC): ICD-10-CM

## 2021-02-18 LAB
ABSOLUTE EOS #: 0.45 K/UL (ref 0–0.44)
ABSOLUTE IMMATURE GRANULOCYTE: 0.04 K/UL (ref 0–0.3)
ABSOLUTE LYMPH #: 2.9 K/UL (ref 1.1–3.7)
ABSOLUTE MONO #: 1.05 K/UL (ref 0.1–1.2)
ALBUMIN SERPL-MCNC: 4.5 G/DL (ref 3.5–5.2)
ALBUMIN/GLOBULIN RATIO: 1.3 (ref 1–2.5)
ALP BLD-CCNC: 104 U/L (ref 40–129)
ALT SERPL-CCNC: 27 U/L (ref 5–41)
ANION GAP SERPL CALCULATED.3IONS-SCNC: 15 MMOL/L (ref 9–17)
AST SERPL-CCNC: 22 U/L
BASOPHILS # BLD: 1 % (ref 0–2)
BASOPHILS ABSOLUTE: 0.09 K/UL (ref 0–0.2)
BILIRUB SERPL-MCNC: 0.53 MG/DL (ref 0.3–1.2)
BUN BLDV-MCNC: 14 MG/DL (ref 6–20)
BUN/CREAT BLD: NORMAL (ref 9–20)
CALCIUM SERPL-MCNC: 10.3 MG/DL (ref 8.6–10.4)
CHLORIDE BLD-SCNC: 104 MMOL/L (ref 98–107)
CHOLESTEROL/HDL RATIO: 7.1
CHOLESTEROL: 220 MG/DL
CO2: 25 MMOL/L (ref 20–31)
CREAT SERPL-MCNC: 0.95 MG/DL (ref 0.7–1.2)
DIFFERENTIAL TYPE: ABNORMAL
EOSINOPHILS RELATIVE PERCENT: 4 % (ref 1–4)
GFR AFRICAN AMERICAN: >60 ML/MIN
GFR NON-AFRICAN AMERICAN: >60 ML/MIN
GFR SERPL CREATININE-BSD FRML MDRD: NORMAL ML/MIN/{1.73_M2}
GFR SERPL CREATININE-BSD FRML MDRD: NORMAL ML/MIN/{1.73_M2}
GLUCOSE BLD-MCNC: 77 MG/DL (ref 70–99)
HCT VFR BLD CALC: 49.5 % (ref 40.7–50.3)
HDLC SERPL-MCNC: 31 MG/DL
HEMOGLOBIN: 16.2 G/DL (ref 13–17)
IMMATURE GRANULOCYTES: 0 %
LDL CHOLESTEROL DIRECT: 94 MG/DL
LDL CHOLESTEROL: ABNORMAL MG/DL (ref 0–130)
LYMPHOCYTES # BLD: 25 % (ref 24–43)
MCH RBC QN AUTO: 28.3 PG (ref 25.2–33.5)
MCHC RBC AUTO-ENTMCNC: 32.7 G/DL (ref 28.4–34.8)
MCV RBC AUTO: 86.4 FL (ref 82.6–102.9)
MONOCYTES # BLD: 9 % (ref 3–12)
NRBC AUTOMATED: 0 PER 100 WBC
PDW BLD-RTO: 13.2 % (ref 11.8–14.4)
PLATELET # BLD: 307 K/UL (ref 138–453)
PLATELET ESTIMATE: ABNORMAL
PMV BLD AUTO: 10.9 FL (ref 8.1–13.5)
POTASSIUM SERPL-SCNC: 4.4 MMOL/L (ref 3.7–5.3)
RBC # BLD: 5.73 M/UL (ref 4.21–5.77)
RBC # BLD: ABNORMAL 10*6/UL
SEG NEUTROPHILS: 61 % (ref 36–65)
SEGMENTED NEUTROPHILS ABSOLUTE COUNT: 7.22 K/UL (ref 1.5–8.1)
SODIUM BLD-SCNC: 144 MMOL/L (ref 135–144)
TOTAL PROTEIN: 8 G/DL (ref 6.4–8.3)
TRIGL SERPL-MCNC: 599 MG/DL
TSH SERPL DL<=0.05 MIU/L-ACNC: 4.68 MIU/L (ref 0.3–5)
VLDLC SERPL CALC-MCNC: ABNORMAL MG/DL (ref 1–30)
WBC # BLD: 11.8 K/UL (ref 3.5–11.3)
WBC # BLD: ABNORMAL 10*3/UL

## 2021-02-18 RX ORDER — LANSOPRAZOLE 15 MG/1
30 CAPSULE, DELAYED RELEASE ORAL DAILY
Qty: 30 CAPSULE | Refills: 3 | Status: SHIPPED | OUTPATIENT
Start: 2021-02-18 | End: 2021-05-17

## 2021-02-19 LAB
ESTIMATED AVERAGE GLUCOSE: 114 MG/DL
HBA1C MFR BLD: 5.6 % (ref 4–6)

## 2021-02-22 DIAGNOSIS — I10 ESSENTIAL HYPERTENSION: ICD-10-CM

## 2021-03-01 ENCOUNTER — OFFICE VISIT (OUTPATIENT)
Dept: ORTHOPEDIC SURGERY | Age: 56
End: 2021-03-01
Payer: COMMERCIAL

## 2021-03-01 DIAGNOSIS — E66.01 MORBIDLY OBESE (HCC): ICD-10-CM

## 2021-03-01 DIAGNOSIS — M25.551 CHRONIC PAIN OF RIGHT HIP: Primary | ICD-10-CM

## 2021-03-01 DIAGNOSIS — G89.29 CHRONIC PAIN OF RIGHT HIP: Primary | ICD-10-CM

## 2021-03-01 PROCEDURE — G8417 CALC BMI ABV UP PARAM F/U: HCPCS | Performed by: ORTHOPAEDIC SURGERY

## 2021-03-01 PROCEDURE — G8428 CUR MEDS NOT DOCUMENT: HCPCS | Performed by: ORTHOPAEDIC SURGERY

## 2021-03-01 PROCEDURE — 99203 OFFICE O/P NEW LOW 30 MIN: CPT | Performed by: ORTHOPAEDIC SURGERY

## 2021-03-01 PROCEDURE — 3017F COLORECTAL CA SCREEN DOC REV: CPT | Performed by: ORTHOPAEDIC SURGERY

## 2021-03-01 PROCEDURE — G8484 FLU IMMUNIZE NO ADMIN: HCPCS | Performed by: ORTHOPAEDIC SURGERY

## 2021-03-01 PROCEDURE — 4004F PT TOBACCO SCREEN RCVD TLK: CPT | Performed by: ORTHOPAEDIC SURGERY

## 2021-03-01 RX ORDER — OXYCODONE HYDROCHLORIDE AND ACETAMINOPHEN 5; 325 MG/1; MG/1
1 TABLET ORAL EVERY 6 HOURS PRN
Qty: 28 TABLET | Refills: 0 | Status: SHIPPED | OUTPATIENT
Start: 2021-03-01 | End: 2021-03-08

## 2021-03-01 NOTE — PROGRESS NOTES
Shannon Sosa M.D.            [address] [facility] [address] [facility],[address], [facility].           Dept Phone: [phone]           Dept Fax:  [phone] [address] [facility]           [place]aya [place]          Dept Phone: [phone]           Dept Fax:  [phone]      Chief Compliant:  Chief Complaint   Patient presents with    Hip Pain     Rt hip        History of Present Illness: This is a 50 y.o. male who presents to the clinic today for evaluation / follow up of severe right hip pain patient has had hip pain on and off for quite some time for the last 6 months has had difficulty daily. He denies any injury or trauma. Patient works on the [employer]. .       Review of Systems   Constitutional: Negative for fever, chills, sweats. Eyes: Negative for changes in vision, or pain. HENT: Negative for ear ache, epistaxis, or sore throat. Respiratory/Cardio: Negative for Chest pain, palpitations, SOB, or cough. Gastrointestinal: Negative for abdominal pain, N/V/D. Genitourinary: Negative for dysuria, frequency, urgency, or hematuria. Neurological: Negative for headache, numbness, or weakness. Integumentary: Negative for rash, itching, laceration, or abrasion. Musculoskeletal: Positive for Hip Pain (Rt hip)       Physical Exam:  Constitutional: Patient is oriented to person, place, and time. Patient appears well-developed and well nourished. HENT: Negative otherwise noted  Head: Normocephalic and Atraumatic  Nose: Normal  Eyes: Conjunctivae and EOM are normal  Neck: Normal range of motion Neck supple. Respiratory/Cardio: Effort normal. No respiratory distress. Musculoskeletal: Physical examination is very difficult to perform. He is basically difficulty ambulating.   He walks with a significantly antalgic gait when he attempt at internal/external rotation shows significant discomfort. Neurological: Patient is alert and oriented to person, place, and time. Normal strenght. No sensory deficit. Skin: Skin is warm and dry  Psychiatric: Behavior is normal. Thought content normal.  Nursing note and vitals reviewed. Labs and Imaging:     XR taken in February of this year show severe end-stage adductor destructive disease of the right hip. He has most likely secondary to avascular process. There is some of the femoral head escape with appears to be some acetabular insufficiency as well. Orders Placed This Encounter   Procedures    CT PELVIS WO CONTRAST Additional Contrast? None     Standing Status:   Future     Standing Expiration Date:   3/1/2022     Order Specific Question:   Additional Contrast?     Answer:   None     Order Specific Question:   Reason for exam:     Answer:   right hip pain    Kayleigh Bundy DO, Weight Management and [facility]     Referral Priority:   Routine     Referral Type:   Eval and Treat     Referral Reason:   Specialty Services Required     Referred to Provider:   Connie Orantes DO     Requested Specialty:   Bariatric Surgery     Number of Visits Requested:   1       Assessment and Plan:  1. Chronic pain of right hip    2. Morbidly obese ([address]    3. 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 male who presents to the clinic today for evaluation / follow up of severe right hip arthritis. Past History:    Current Outpatient Medications:     oxyCODONE-acetaminophen (PERCOCET) 5-325 MG per tablet, Take 1 tablet by mouth every 6 hours as needed for Pain for up to 7 days. Intended supply: 7 days.  Take lowest dose possible to manage pain, Disp: 28 tablet, Rfl: 0    lansoprazole (PREVACID) 15 MG delayed release capsule, Take 2 capsules by mouth daily Taking OTC medication, Disp: 30 capsule, Rfl: 3    amLODIPine (NORVASC) 10 MG tablet, Take 1 tablet by mouth daily, Disp: 30 tablet, Rfl: 3    atorvastatin (LIPITOR) 40 MG tablet, Take 1 tablet by mouth daily, Disp: 30 tablet, Rfl: 3    aspirin 325 MG tablet, Take 1 tablet by mouth daily, Disp: 30 tablet, Rfl: 3    metoprolol tartrate (LOPRESSOR) 25 MG tablet, Take 1 tablet by mouth 2 times daily, Disp: 60 tablet, Rfl: 3    acetaminophen (TYLENOL) 500 MG tablet, Take 2 tablets by mouth 3 times daily, Disp: 540 tablet, Rfl: 1    Blood Pressure KIT, 1 kit by Does not apply route daily, Disp: 1 kit, Rfl: 0    nitroGLYCERIN (NITROSTAT) 0.4 MG SL tablet, up to max of 3 total doses. If no relief after 1 dose, call 911.  (Patient not taking: Reported on 2/17/2021), Disp: 25 tablet, Rfl: 3  Allergies   Allergen Reactions    Lisinopril Anaphylaxis     Social History     Socioeconomic History    Marital status: Single     Spouse name: Not on file    Number of children: Not on file    Years of education: Not on file    Highest education level: Not on file   Occupational History    Not on file   Social Needs    Financial resource strain: Not on file    Food insecurity     Worry: Not on file     Inability: Not on file    Transportation needs     Medical: Not on file     Non-medical: Not on file   Tobacco Use    Smoking status: Current Some Day Smoker     Types: Cigars    Smokeless tobacco: Never Used    Tobacco comment: black and milds    Substance and Sexual Activity    Alcohol use: Not Currently     Comment: used to be heavy drinker 15 years ago - occasionally    Drug use: Yes     Comment: cocaine , a few days ago    Sexual activity: Not on file   Lifestyle    Physical activity     Days per week: Not on file     Minutes per session: Not on file    Stress: Not on file   Relationships    Social connections     Talks on phone: Not on file     Gets together: Not on file     Attends [employer] service: Not on file     Active member of club or organization: Not on file Attends meetings of clubs or organizations: Not on file     Relationship status: Not on file    Intimate partner violence     Fear of current or ex partner: Not on file     Emotionally abused: Not on file     Physically abused: Not on file     Forced sexual activity: Not on file   Other Topics Concern    Not on file   Social History Narrative    Not on file     Past Medical History:   Diagnosis Date    Red's esophagus 15 years ago     gets scoped every 2-3 years    GERD (gastroesophageal reflux disease)     Gout     Hyperlipidemia     Hypertension     Morbid obesity ([address]     KVNG (obstructive sleep apnea)      Past Surgical History:   Procedure Laterality Date    ENDOSCOPY, COLON, DIAGNOSTIC      mulitple EGDs    HERNIA REPAIR  1982    hiatal hernia repair    TONSILLECTOMY      UPPER GASTROINTESTINAL ENDOSCOPY N/A 3/11/2019    EGD BIOPSY performed by Viet Scales [facility] at [facility]     Family History   Problem Relation Age of Onset    Cancer Mother     Heart Disease Father     High Blood Pressure Father     Other Brother         parkinsons    Cancer Maternal Uncle     Diabetes Paternal Cousin    Plan  Patient was counseled regarding his hip. I think that his CT scans in order to better assess the acetabular status. Also the patient be is referred for weight management program as he is severely morbidly obese. Patient has some cardiac history he denies any diabetes. Patient was also given a prescription for Percocet as well as a cane. I will reviewed the patient's CT scan once available. Patient may require referral to a revision surgeon as he does appear he may have significant acetabular insufficiency. There is also noted the patient has a history of septic arthritis of of his ankle on the left side      Provider Attestation:  Myron Smith, personally performed the services described in this documentation.  All medical record entries made by the scribmessi were at my direction and in my presence. I have reviewed the chart and discharge instructions and agree that the records reflect my personal performance and is accurate and complete. Kelsi Thomas MD. 03/01/21      Please note that this chart was generated using voice recognition Dragon dictation software. Although every effort was made to ensure the accuracy of this automated transcription, some errors in transcription may have occurred.

## 2021-03-04 ENCOUNTER — TELEPHONE (OUTPATIENT)
Dept: ORTHOPEDIC SURGERY | Age: 56
End: 2021-03-04

## 2021-03-04 NOTE — TELEPHONE ENCOUNTER
I was handed paperwork about 5:10 by staff on this patient for [employer]. One set of papers are dated 2/25/21 that were faxed to us stating patient off work from 02/22/2021. The other set of papers are dated 3/4/21 from [employer]. Patient seen first time in office on 3/1/21, no note in the progress note that patient is off work. I called patient, no answer, I left message for him to call us back. I do see a work note from [employer] APRN-CNP dated 2/24/21 keeping patient off until appointment on 3/1/21. Will hold disability forms until patient calls us back. If Dr. Lyric Torres approves time off work, patient will need to sign Release form for [employer].

## 2021-03-09 ENCOUNTER — OFFICE VISIT (OUTPATIENT)
Dept: INTERNAL MEDICINE CLINIC | Age: 56
End: 2021-03-09
Payer: COMMERCIAL

## 2021-03-09 VITALS
HEART RATE: 73 BPM | WEIGHT: 315 LBS | TEMPERATURE: 97 F | OXYGEN SATURATION: 98 % | DIASTOLIC BLOOD PRESSURE: 70 MMHG | HEIGHT: 69 IN | SYSTOLIC BLOOD PRESSURE: 136 MMHG | BODY MASS INDEX: 46.65 KG/M2

## 2021-03-09 DIAGNOSIS — E66.01 MORBIDLY OBESE (HCC): ICD-10-CM

## 2021-03-09 DIAGNOSIS — Z12.11 COLON CANCER SCREENING: ICD-10-CM

## 2021-03-09 DIAGNOSIS — I10 ESSENTIAL HYPERTENSION: Primary | ICD-10-CM

## 2021-03-09 PROCEDURE — G8484 FLU IMMUNIZE NO ADMIN: HCPCS | Performed by: NURSE PRACTITIONER

## 2021-03-09 PROCEDURE — G8417 CALC BMI ABV UP PARAM F/U: HCPCS | Performed by: NURSE PRACTITIONER

## 2021-03-09 PROCEDURE — 4004F PT TOBACCO SCREEN RCVD TLK: CPT | Performed by: NURSE PRACTITIONER

## 2021-03-09 PROCEDURE — 99213 OFFICE O/P EST LOW 20 MIN: CPT | Performed by: NURSE PRACTITIONER

## 2021-03-09 PROCEDURE — 3017F COLORECTAL CA SCREEN DOC REV: CPT | Performed by: NURSE PRACTITIONER

## 2021-03-09 PROCEDURE — G8427 DOCREV CUR MEDS BY ELIG CLIN: HCPCS | Performed by: NURSE PRACTITIONER

## 2021-03-09 RX ORDER — OXYCODONE HYDROCHLORIDE AND ACETAMINOPHEN 5; 325 MG/1; MG/1
1 TABLET ORAL EVERY 4 HOURS PRN
COMMUNITY
End: 2021-03-12 | Stop reason: SDUPTHER

## 2021-03-09 ASSESSMENT — ENCOUNTER SYMPTOMS
NAUSEA: 0
CHEST TIGHTNESS: 0
CONSTIPATION: 0
SHORTNESS OF BREATH: 0
WHEEZING: 0
RHINORRHEA: 0
COUGH: 0
TROUBLE SWALLOWING: 0
SINUS PAIN: 0
BLOOD IN STOOL: 0
VOMITING: 0
DIARRHEA: 0
ABDOMINAL PAIN: 0
SORE THROAT: 0

## 2021-03-09 ASSESSMENT — PATIENT HEALTH QUESTIONNAIRE - PHQ9: SUM OF ALL RESPONSES TO PHQ QUESTIONS 1-9: 1

## 2021-03-09 NOTE — TELEPHONE ENCOUNTER
Dr. Tita Gusman,  We received paperwork for patient to be off work. NKAUL Renee took him off work until 3/1/21 which is the day he saw you. I called the patient to see if he went back and he stated \"No\", he can't walk. He stated you told him you was keeping him off work, although there is nothing in your note to that affect. How long do you want to keep the patient off work?

## 2021-03-09 NOTE — PATIENT INSTRUCTIONS
Patient Education        Learning About Low-Carbohydrate Diets  What is a low-carbohydrate diet? A low-carbohydrate (or \"low-carb\") diet limits foods and drinks that have carbohydrates. This includes grains, fruits, milk and yogurt, and starchy vegetables like potatoes, beans, and corn. It also avoids foods and drinks that have added sugar. Instead, low-carb diets include foods that are high in protein and fat. Why might you follow a low-carb diet? Low-carb diets may be used for a variety of reasons, such as for weight loss. People who have diabetes may use a low-carb diet to help manage their blood sugar levels. What should you do before you start the diet? Talk to your doctor before you try any diet. This is even more important if you have health problems like kidney disease, heart disease, or diabetes. Your doctor may suggest that you meet with a registered dietitian. A dietitian can help you make an eating plan that works for you. What foods do you eat on a low-carb diet? On a low-carb diet, you choose foods that are high in protein and fat. Examples of these are:  · Meat, poultry, and fish. · Eggs. · Nuts, such as walnuts, pecans, almonds, and peanuts. · Peanut butter and other nut butters. · Tofu. · Avocado. · Kasi Stanchfield. · Non-starchy vegetables like broccoli, cauliflower, green beans, mushrooms, peppers, lettuce, and spinach. · Unsweetened non-dairy milks like almond milk and coconut milk. · Cheese, cottage cheese, and cream cheese. Current as of: August 22, 2019               Content Version: 12.6  © 3551-0261 [employer]. Care instructions adapted under license by [facility] ([facility]). If you have questions about a medical condition or this instruction, always ask your healthcare professional. [address]  any warranty or liability for your use of this information.          Patient Education        Learning About Cutting Calories  How do calories affect your weight? Food gives your body energy. Energy from the food you eat is measured in calories. This energy keeps your heart beating, your brain active, and your muscles working. Your body needs a certain number of calories each day. After your body uses the calories it needs, it stores extra calories as fat. To lose weight safely, you have to eat fewer calories while eating in a healthy way. How many calories do you need each day? The more active you are, the more calories you need. When you are less active, you need fewer calories. How many calories you need each day also depends on several things, including your age and whether you are male or female. Here are some general guidelines for adults:  · Less active women and older adults need 1,600 to 2,000 calories each day. · Active women and less active men need 2,000 to 2,400 calories each day. · Active men need 2,400 to 3,000 calories each day. How can you cut calories and eat healthy meals? Whole grains, vegetables and fruits, and dried beans are good lower-calorie foods. They give you lots of nutrients and fiber. And they fill you up. Sweets, energy drinks, and soda pop are high in calories. They give you few nutrients and no fiber. Try to limit soda pop, fruit juice, and energy drinks. Drink water instead. Some fats can be part of a healthy diet. But cutting back on fats from highly processed foods like fast foods and many snack foods is a good way to lower the calories in your diet. Also, use smaller amounts of fats like butter, margarine, salad dressing, and mayonnaise. Add fresh garlic, lemon, or herbs to your meals to add flavor without adding fat. Meats and dairy products can be a big source of hidden fats. Try to choose lean or low-fat versions of these products. Fat-free cookies, candies, chips, and frozen treats can still be high in sugar and calories. Some fat-free foods have more calories than regular ones.  Eat fat-free treats in moderation, as you would other foods. If your favorite foods are high in fat, salt, sugar, or calories, limit how often you eat them. Eat smaller servings, or look for healthy substitutes. Fill up on fruits, vegetables, and whole grains. Eating at home  · Use meat as a side dish instead of as the main part of your meal.  · Try main dishes that use whole wheat pasta, brown rice, dried beans, or vegetables. · Find ways to cook with little or no fat, such as broiling, steaming, or grilling. · Use cooking spray instead of oil. If you use oil, use a monounsaturated oil, such as canola or olive oil. · Trim fat from meats before you cook them. · Drain off fat after you brown the meat or while you roast it. · Chill soups and stews after you cook them. Then skim the fat off the top after it hardens. Eating out  · Order foods that are broiled or poached rather than fried or breaded. · Cut back on the amount of butter or margarine that you use on bread. · Order sauces, gravies, and salad dressings on the side, and use only a little. · When you order pasta, choose tomato sauce rather than cream sauce. · Ask for salsa with your baked potato instead of sour cream, butter, cheese, or bauer. · Order meals in a small size instead of upgrading to a large. · Share an entree, or take part of your food home to eat as another meal.  · Share appetizers and desserts. Where can you learn more? Go to https://[employer]."[employer]. org and sign in to your [employer] account. Enter Q598 in the [facility] box to learn more about \"Learning About Cutting Calories. \"     If you do not have an account, please click on the \"Sign Up Now\" link. Current as of: August 22, 2019               Content Version: 12.6  © 4355-4095 [employer]. Care instructions adapted under license by [facility] ([facility]).  If you have questions about a medical condition or this instruction, always ask your healthcare professional. Frankie Bowden isocarboxazid, linezolid, methylene blue injection, phenelzine, rasagiline, selegiline, or tranylcypromine. What is phentermine? Phentermine is similar to an amphetamine. Phentermine stimulates the central nervous system (nerves and brain), which increases your heart rate and blood pressure and decreases your appetite. Phentermine is used together with diet and exercise to treat obesity, especially in people with risk factors such as high blood pressure, high cholesterol, or diabetes. Phentermine may also be used for purposes not listed in this medication guide. What should I discuss with my healthcare provider before taking phentermine? You should not use phentermine if you are allergic to it, or if you have:  · a history of heart disease (coronary artery disease, heart rhythm problems, congestive heart failure, stroke);  · severe or uncontrolled high blood pressure;  · overactive thyroid;  · glaucoma;  · extreme agitation or nervousness;  · a history of drug abuse; or  · if you take other diet pills. Do not use phentermine if you have used an MAO inhibitor in the past 14 days. A dangerous drug interaction could occur. MAO inhibitors include isocarboxazid, linezolid, methylene blue injection, phenelzine, rasagiline, selegiline, tranylcypromine, and others. Weight loss during pregnancy can harm an unborn baby, even if you are overweight. Do not use phentermine if you are pregnant. Tell your doctor right away if you become pregnant during treatment. You should not breast-feed while using phentermine. Tell your doctor if you have ever had:  · heart disease or coronary artery disease;  · a heart valve disorder;  · high blood pressure;  · diabetes (your diabetes medication dose may need to be adjusted); or  · kidney disease. Phentermine is not approved for use by anyone younger than 12years old. How should I take phentermine?   Follow all directions on your prescription label and read all medication guides or instruction sheets. Your doctor may occasionally change your dose. Use the medicine exactly as directed. Phentermine is usually taken before breakfast, or 1 to 2 hours after breakfast. Follow your doctor's dosing instructions very carefully. Never use phentermine in larger amounts, or for longer than prescribed. Taking more of this medication will not make it more effective and can cause serious, life-threatening side effects. Phentermine is for short-term use only. The effects of appetite suppression may wear off after a few weeks. Phentermine may be habit-forming. Misuse can cause addiction, overdose, or death. Selling or giving away this medicine is against the law. Call your doctor at once if you think this medicine is not working as well, or if you have not lost at least 4 pounds within 4 weeks. Do not stop using phentermine suddenly, or you could have unpleasant withdrawal symptoms. Ask your doctor how to safely stop using this medicine. Store at room temperature away from moisture and heat. Keep the bottle tightly closed when not in use. What happens if I miss a dose? Take the medicine as soon as you can, but skip the missed dose if it is late in the day. Do not  take two doses at one time. What happens if I overdose? Seek emergency medical attention or call the Poison Help line at 1-894.614.3359. An overdose of phentermine can be fatal.  Overdose symptoms may include confusion, panic, hallucinations, extreme restlessness, nausea, vomiting, diarrhea, stomach cramps, feeling tired or depressed, irregular heartbeats, weak pulse, seizure, or slow breathing (breathing may stop). What should I avoid while taking phentermine? Avoid driving or hazardous activity until you know how this medicine will affect you. Your reactions could be impaired. Drinking alcohol with this medicine can cause side effects. What are the possible side effects of phentermine?   Get emergency medical help if you have signs information contained herein may be time sensitive. [employer] information has been compiled for use by healthcare practitioners and consumers in the [place] and therefore [employer] does not warrant that uses outside of the [place] are appropriate, unless specifically indicated otherwise. [facility] drug information does not endorse drugs, diagnose patients or recommend therapy. [employer] drug information is an informational resource designed to assist licensed healthcare practitioners in caring for their patients and/or to serve consumers viewing this service as a supplement to, and not a substitute for, the expertise, skill, knowledge and judgment of healthcare practitioners. The absence of a warning for a given drug or drug combination in no way should be construed to indicate that the drug or drug combination is safe, effective or appropriate for any given patient. [facility] does not assume any responsibility for any aspect of healthcare administered with the aid of information [facility] provides. The information contained herein is not intended to cover all possible uses, directions, precautions, warnings, drug interactions, allergic reactions, or adverse effects. If you have questions about the drugs you are taking, check with your doctor, nurse or pharmacist.  Copyright 7129-9398 [address]. Version: 10.01. Revision date: 7/26/2018. Care instructions adapted under license by [facility] ([facility]). If you have questions about a medical condition or this instruction, always ask your healthcare professional. [place] [address] any warranty or liability for your use of this information.

## 2021-03-09 NOTE — TELEPHONE ENCOUNTER
I called patient back again today, he stated the number [phone] is his father phone number not his, We need to call [phone] in the future. Patient states he is unable to work, he can hardly walk. I will send message to Dr. Elspeth Jeans to see how long we are keeping him off work.

## 2021-03-09 NOTE — PROGRESS NOTES
History was obtained from the patient. Estela Ceja is a 50 y.o. male here today for blood pressure recheck and weight loss management. Patient has recently seen by Dr. Karl Oliveira and was diagnosed with severe end-stage DJD of right hip. Patient was informed he is not a surgical candidate at this time due to his obesity and was instructed to lose 50 lbs. Diet, exercise and lifestyle modifications discussed thoroughly during visit. Patient does not wish to have bariatric surgery for weight loss management. He would like to try diet and exercise (as tolerated) for weight loss. 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       lansoprazole (PREVACID) 15 MG delayed release capsule Take 2 capsules by mouth daily Taking OTC medication 30 capsule 3    amLODIPine (NORVASC) 10 MG tablet Take 1 tablet by mouth daily 30 tablet 3    atorvastatin (LIPITOR) 40 MG tablet Take 1 tablet by 1429   BP: 136/70   Pulse: 73   Temp: 97 °F (36.1 °C)   SpO2: 98%   Weight: (!) 361 lb (163.7 kg)   Height: 5' 9\" (1.753 m)     BP Readings from Last 3 Encounters:   03/09/21 136/70   02/17/21 (!) 160/102   11/21/19 (!) 158/104      Wt Readings from Last 3 Encounters:   03/09/21 (!) 361 lb (163.7 kg)   02/17/21 (!) 351 lb (159.2 kg)   11/19/19 (!) 350 lb (158.8 kg)       Physical Exam  Vitals signs reviewed. Constitutional:       Appearance: Normal appearance. He is obese. HENT:      Head: Normocephalic. Cardiovascular:      Rate and Rhythm: Normal rate and regular rhythm. Pulses: Normal pulses. Heart sounds: Normal heart sounds. Pulmonary:      Effort: Pulmonary effort is normal.      Breath sounds: Normal breath sounds. Abdominal:      General: Bowel sounds are normal.      Palpations: Abdomen is soft. Musculoskeletal: Normal range of motion. General: No swelling, tenderness or deformity. Skin:     General: Skin is warm and dry. Neurological:      General: No focal deficit present. Mental Status: He is alert and oriented to person, place, and time. Psychiatric:         Mood and Affect: Mood normal.         Behavior: Behavior normal.         Thought Content: Thought content normal.         Judgment: Judgment normal.          LABORATORY FINDINGS:    CBC:  Lab Results   Component Value Date    WBC 11.8 02/18/2021    HGB 16.2 02/18/2021     02/18/2021       BMP:    Lab Results   Component Value Date     02/18/2021    K 4.4 02/18/2021     02/18/2021    CO2 25 02/18/2021    BUN 14 02/18/2021    CREATININE 0.95 02/18/2021    GLUCOSE 77 02/18/2021       HEMOGLOBIN A1C:   Lab Results   Component Value Date    LABA1C 5.6 02/18/2021       FASTING LIPID PANEL:  Lab Results   Component Value Date    CHOL 220 (H) 02/18/2021    HDL 31 (L) 02/18/2021    TRIG 599 (H) 02/18/2021       ASSESSMENT AND PLAN:      1.  Essential hypertension  -well controlled with amlodipine and metoprolol    2. Morbidly obese ([address]  - encourage low carb/healthy diet, reducing fast food intake, exercise (as tolerated), increasing water intake. - due to patients history of CAD and history of MI, and HTN hesitant to start Adipex at this time     3. Colon cancer screening  - Cologuard (For External Results Only); Future      FOLLOW UP AND INSTRUCTIONS:   Return in about 4 weeks (around 4/6/2021). Tara Becerra received counseling on the following healthy behaviors: nutrition, exercise and medication adherence    Discussed use, benefit, and side effects of prescribed medications. Barriers to medication compliance addressed. All patient questions answered. Patient voiced understanding. Patient given educational materials - see patient instructions    GILES Suero - CNP   [facility]  3/9/2021, 5:34 PM    Please note that this chart was generated using voice recognition Dragon dictation software. Although everyeffort was made to ensure the accuracy of this automated transcription, some errors in transcription may have occurred.

## 2021-03-09 NOTE — TELEPHONE ENCOUNTER
I have the patient scheduled for CT scan of his right hip. He is likely to be referred to Dr. Tayo Hope given the severity of his hip. He was also placed and scheduled for weight management program.  He is not a surgical candidate at this time patient is barely able to walk so therefore he should be off work indefinitely.

## 2021-03-10 NOTE — TELEPHONE ENCOUNTER
Called left message for patient to come into the office to sign the EZEKIEL for \"[employer]\" and we need the first day he was unable to work for the form as well.

## 2021-03-11 NOTE — TELEPHONE ENCOUNTER
Patient called back today and was informed that he needs to come sign the release for paperwork to be sent.

## 2021-03-11 NOTE — TELEPHONE ENCOUNTER
Received the EZEKIEL on my desk and the start date of disability of 2/22/21 per the patient. Also received paperwork for [employer] which I will complete, but will need to get Dr. Matti Mcbride signature before I can fax over. I will complete the paperwork and fax to \"[employer] [facility] today.

## 2021-03-16 ENCOUNTER — HOSPITAL ENCOUNTER (OUTPATIENT)
Dept: CT IMAGING | Facility: CLINIC | Age: 56
Discharge: HOME | End: 2021-03-18
Payer: COMMERCIAL

## 2021-03-16 DIAGNOSIS — G89.29 CHRONIC PAIN OF RIGHT HIP: ICD-10-CM

## 2021-03-16 DIAGNOSIS — M25.551 CHRONIC PAIN OF RIGHT HIP: ICD-10-CM

## 2021-03-16 PROCEDURE — 73700 CT LOWER EXTREMITY W/O DYE: CPT

## 2021-03-17 ENCOUNTER — TELEPHONE (OUTPATIENT)
Dept: INTERNAL MEDICINE CLINIC | Age: 56
End: 2021-03-17

## 2021-03-17 NOTE — TELEPHONE ENCOUNTER
Jayant Maddox is concern with patient needing dietitian and unable to afford medication and doctor visits.

## 2021-03-23 ENCOUNTER — CARE COORDINATION (OUTPATIENT)
Dept: CARE COORDINATION | Age: 56
End: 2021-03-23

## 2021-03-23 DIAGNOSIS — M25.551 CHRONIC PAIN OF RIGHT HIP: Primary | ICD-10-CM

## 2021-03-23 DIAGNOSIS — G89.29 CHRONIC PAIN OF RIGHT HIP: Primary | ICD-10-CM

## 2021-03-23 NOTE — CARE COORDINATION
Left detailed VM requesting a return call to [facility].    Per PCP he needs help with med cost and a dietician referral.   Nadine Mathur RN, ambulatory care manager

## 2021-03-24 DIAGNOSIS — M25.551 CHRONIC PAIN OF RIGHT HIP: ICD-10-CM

## 2021-03-24 DIAGNOSIS — G89.29 CHRONIC PAIN OF RIGHT HIP: ICD-10-CM

## 2021-03-24 NOTE — TELEPHONE ENCOUNTER
Pharmacy requested refill of percocet. Last rx was 3/15. Patient being referred to [facility] by Earl Cervantes. Want to refill? Lucas Tineo is gone for weekend.

## 2021-03-25 RX ORDER — OXYCODONE HYDROCHLORIDE AND ACETAMINOPHEN 5; 325 MG/1; MG/1
TABLET ORAL
Qty: 28 TABLET | OUTPATIENT
Start: 2021-03-25

## 2021-03-25 NOTE — TELEPHONE ENCOUNTER
Patient says Dr Ana Bell at [facility] can not see him until 04/20/2021, he does have an appt at 2pm this day. However, he is in a lot of pain and asking for advice on what he can take in the meantime.      Please call patient with direction  Thank you

## 2021-03-26 ENCOUNTER — TELEPHONE (OUTPATIENT)
Dept: INTERNAL MEDICINE CLINIC | Age: 56
End: 2021-03-26

## 2021-03-26 VITALS — BODY MASS INDEX: 46.65 KG/M2 | HEIGHT: 69 IN | WEIGHT: 315 LBS

## 2021-03-26 NOTE — TELEPHONE ENCOUNTER
Pt called & stated he was referred to ortho for rt hip replacement. Pt has be referred to Dr. José Tony then Dr. Jerome Kim and now Dr. Ilana Gregorio. Pt is in a lot of pain and wants to know if you would be will to rx pain meds until his appt w/ Dr. Ilana Gregorio on 4/20/21. Please advise.     Allergies   Allergen Reactions    Lisinopril Anaphylaxis

## 2021-03-26 NOTE — TELEPHONE ENCOUNTER
Pt called & stated that Teressa Cabot referred pt to Dr. Maisie Bence for total rt hip replacement and then Dr. Maisie Bence went on some type of leave so pt was referred to Dr. Shant Reid,     Dr. Radha Branch load was to heavy so Dr. Shant Reid referred pt to Dr. Sanam Lee. Pt is not les'd to see Dr. Sanam Lee until 4/20/21. Dr. Maisie Bence originally completed [employer] form Certificate of Ongoing Illness or Injury. [employer] informed pt today that they have him returning to work on 3/17/21,  however pt states he is in a lot of pain & is using a walker & is unable to work. Pt needs new paperwork completed for [employer] and wants to know if Teressa Cabot would be willing to sign for extended time off. Pt states he will probably get the form in the mail sometime next week. Please advise who is responsible for complete the form, Dr. Maisie Bence, Dr. Shant Reid or can Teressa Cabot.

## 2021-03-26 NOTE — TELEPHONE ENCOUNTER
Placed follow up call to patient to advise on how to manage pain without narcotics to help him get through until his appointment with Dr. Kiel Hobbs on 4/20/21. Patient states he is currently taking care of his elderly father and patient himself is awaiting a total hip replacement. Informed patient of Dr. Maximo Rush advice. Patient states he has Red's esophagus and therefore \"cannot really take NSAIDs. \" He does report taking aspirin once daily for \"blood pressure and heart issues. \" He is unsure of the dosage of aspirin. Patient states he doesn't really take the narcotics during the day, \"because I'm in pain, anyway and they don't really help-help. \" He reports he takes them at night to help him sleep, because sleep is difficult right now. Discussed taking Tylenol throughout the day on a schedule, instead of as-needed. Do not exceed 1,000 mg per dose or 4,000 mg / day. Discussed melatonin or Benadryl for sleep. Patient has taken melatonin before with little success. Discussed how to take it properly. Patient may call his PCP to discuss a prescription sleep aid. Patient is willing to try these things, but feels the narcotic, \"two at night,\" really helps him sleep, Nhan Murcia is all I really want it for. I'm not a pill person. \"

## 2021-03-29 ENCOUNTER — TELEPHONE (OUTPATIENT)
Dept: ORTHOPEDIC SURGERY | Age: 56
End: 2021-03-29

## 2021-03-29 NOTE — TELEPHONE ENCOUNTER
Patient calling to request a phone call from clinical staff. He states that he was seen in office a few times by either Dr Vinh Flanagan and/or Dr Francisco Hinson and he would like to speak to someone in the office regarding his pain medication and his paperwork for [employer] sickness benefits.   Please give the patient a call at: [phone]

## 2021-03-29 NOTE — TELEPHONE ENCOUNTER
Called patient to inform of refusal of narcotic pain medication. See Telephone Encounter. Left voicemail.

## 2021-03-29 NOTE — TELEPHONE ENCOUNTER
Placed follow up call to patient and left voicemail. Informed that narcotic pain medication was refused at this time, but other options for pain relief can be discussed. Informed of faxes to [facility] on 3/15/21 and [facility] on 3/11/21 and asked patient to please call back to discuss further needs. Left office number repeated twice.

## 2021-03-30 DIAGNOSIS — M16.11 OSTEOARTHRITIS OF ONE HIP, RIGHT: Primary | ICD-10-CM

## 2021-03-30 RX ORDER — OXYCODONE HYDROCHLORIDE AND ACETAMINOPHEN 5; 325 MG/1; MG/1
1 TABLET ORAL EVERY 6 HOURS PRN
Qty: 28 TABLET | Refills: 0 | Status: SHIPPED | OUTPATIENT
Start: 2021-03-30 | End: 2021-04-06

## 2021-03-30 NOTE — TELEPHONE ENCOUNTER
Writer spoke w/ pt regarding paperwork and pt stated that he was able to get a sooner appt w/ Dr. Farida Corado next week on 4/6/21 and is going to take the paperwork w/ him to his appt.

## 2021-03-30 NOTE — TELEPHONE ENCOUNTER
Wrote for one weeks work of percocet. Patient needs to follow with ortho for continued pain medication. Please check and see if he can be moved up to first cancellation list/ try to move his appointment earlier.

## 2021-03-30 NOTE — TELEPHONE ENCOUNTER
Writer spoke w/ pt and informed him per Teressa Cabot she is able to rx one wk supply of percocet. Pt states he called Dr. Cassie Mckeon office and was able to get a sooner appt on 4/6/21.     Writer faxed results of Xray of rt hip and CT of rt hip to Dr. Cassie Mckeon office @ fax # [phone]

## 2021-04-06 ENCOUNTER — TELEPHONE (OUTPATIENT)
Dept: INTERNAL MEDICINE CLINIC | Age: 56
End: 2021-04-06

## 2021-04-06 NOTE — TELEPHONE ENCOUNTER
Patient called and stated that he is having surgery on May 17, he said that Dr. Kusum Aguillon said he will not give him any pain medication until after surgery that's when he will manage his pain. Dr. Kusum Aguillon told patient to call our office to see his you can prescribe pain medication until he has surgery.        Please advise

## 2021-04-07 DIAGNOSIS — M16.11 OSTEOARTHRITIS OF ONE HIP, RIGHT: Primary | ICD-10-CM

## 2021-04-07 RX ORDER — OXYCODONE HYDROCHLORIDE AND ACETAMINOPHEN 5; 325 MG/1; MG/1
1 TABLET ORAL EVERY 6 HOURS PRN
Qty: 120 TABLET | Refills: 0 | Status: SHIPPED | OUTPATIENT
Start: 2021-04-07 | End: 2021-05-04 | Stop reason: SDUPTHER

## 2021-04-07 NOTE — TELEPHONE ENCOUNTER
I will need to check with John Dukes, because we do not prescribe long term doses of narcotics. I may need to placed a referral to pain management.

## 2021-04-07 NOTE — TELEPHONE ENCOUNTER
Per Julio Echevarria to contact patient to  Rx for Oxycodone and inform patient for prn until surgery. Contacted patient and informed that script is ready for .

## 2021-04-16 ENCOUNTER — TELEPHONE (OUTPATIENT)
Dept: INTERNAL MEDICINE CLINIC | Age: 56
End: 2021-04-16

## 2021-04-16 NOTE — TELEPHONE ENCOUNTER
Medical surgical clearance request received 04/16/21    Surgeon: Nanette Spivey MD    Procedure:  Total Rt Hip     Date of Procedure: Not yet scheduled    Last appt: 3/9/21    Next appt: Visit date not found    PATs received:    [] yes   [x] no    Request placed in Dr. Venessa Posada

## 2021-04-19 NOTE — TELEPHONE ENCOUNTER
Patient stated that he has her surgery scheduled for May 17, 2021. Patient said he cant afford any more appts. He is not working because of his hip.  Patient refused appt right now

## 2021-05-03 NOTE — TELEPHONE ENCOUNTER
Spoke with Dr Viet Chilel office, surgeon. They will fax over the clearance request and what they need.     Please fax clearance and test results back to [facility] [phone]

## 2021-05-04 ENCOUNTER — OFFICE VISIT (OUTPATIENT)
Dept: INTERNAL MEDICINE CLINIC | Age: 56
End: 2021-05-04
Payer: COMMERCIAL

## 2021-05-04 VITALS
HEIGHT: 69 IN | DIASTOLIC BLOOD PRESSURE: 74 MMHG | SYSTOLIC BLOOD PRESSURE: 130 MMHG | OXYGEN SATURATION: 96 % | HEART RATE: 74 BPM | BODY MASS INDEX: 46.65 KG/M2 | TEMPERATURE: 98.2 F | WEIGHT: 315 LBS

## 2021-05-04 DIAGNOSIS — Z01.818 PRE-OPERATIVE CLEARANCE: Primary | ICD-10-CM

## 2021-05-04 DIAGNOSIS — M16.11 OSTEOARTHRITIS OF ONE HIP, RIGHT: ICD-10-CM

## 2021-05-04 DIAGNOSIS — I21.4 NSTEMI (NON-ST ELEVATED MYOCARDIAL INFARCTION) (HCC): ICD-10-CM

## 2021-05-04 DIAGNOSIS — I25.119 CORONARY ARTERY DISEASE INVOLVING NATIVE CORONARY ARTERY WITH ANGINA PECTORIS, UNSPECIFIED WHETHER NATIVE OR TRANSPLANTED HEART (HCC): ICD-10-CM

## 2021-05-04 PROCEDURE — G8427 DOCREV CUR MEDS BY ELIG CLIN: HCPCS | Performed by: NURSE PRACTITIONER

## 2021-05-04 PROCEDURE — G8417 CALC BMI ABV UP PARAM F/U: HCPCS | Performed by: NURSE PRACTITIONER

## 2021-05-04 PROCEDURE — 3017F COLORECTAL CA SCREEN DOC REV: CPT | Performed by: NURSE PRACTITIONER

## 2021-05-04 PROCEDURE — 1036F TOBACCO NON-USER: CPT | Performed by: NURSE PRACTITIONER

## 2021-05-04 PROCEDURE — 99213 OFFICE O/P EST LOW 20 MIN: CPT | Performed by: NURSE PRACTITIONER

## 2021-05-04 RX ORDER — OXYCODONE HYDROCHLORIDE AND ACETAMINOPHEN 5; 325 MG/1; MG/1
2 TABLET ORAL EVERY 6 HOURS PRN
Qty: 120 TABLET | Refills: 0 | Status: SHIPPED | OUTPATIENT
Start: 2021-05-04 | End: 2021-06-03

## 2021-05-04 ASSESSMENT — PATIENT HEALTH QUESTIONNAIRE - PHQ9
SUM OF ALL RESPONSES TO PHQ9 QUESTIONS 1 & 2: 1
SUM OF ALL RESPONSES TO PHQ QUESTIONS 1-9: 1
SUM OF ALL RESPONSES TO PHQ QUESTIONS 1-9: 1
2. FEELING DOWN, DEPRESSED OR HOPELESS: 1

## 2021-05-04 NOTE — PROGRESS NOTES
Visit Information    Have you changed or started any medications since your last visit including any over-the-counter medicines, vitamins, or herbal medicines? no   Are you having any side effects from any of your medications? -  no  Have you stopped taking any of your medications? Is so, why? -  no    Have you seen any other physician or provider since your last visit? Yes - Records Obtained  Have you had any other diagnostic tests since your last visit? Yes - Records Obtained  Have you been seen in the emergency room and/or had an admission to a hospital since we last saw you? No  Have you had your routine dental cleaning in the past 6 months? no    Have you activated your [employer]® account? If not, what are your barriers?  No:      Patient Care Team:  Marcus Dumont MD as PCP - General (Internal Medicine)  Marcus Dumont MD as PCP - [facility]    Medical History Review  Past Medical, Family, and Social History reviewed and does not contribute to the patient presenting condition    Health Maintenance   Topic Date Due    Hepatitis C screen  Never done    COVID-19 Vaccine (1) Never done    HIV screen  Never done    DTaP/Tdap/Td vaccine (1 - Tdap) Never done    Shingles Vaccine (1 of 2) Never done    Colon cancer screen colonoscopy  Never done    Flu vaccine (Season Ended) 09/01/2021    Lipid screen  02/18/2022    Hepatitis A vaccine  Aged Out    Hepatitis B vaccine  Aged Out    Hib vaccine  Aged Out    Meningococcal (ACWY) vaccine  Aged Out    Pneumococcal 0-64 years Vaccine  Aged Out         [address]  [place] [address]  Dept: [phone]  Dept Fax: [phone]    OfficeProgress/Follow Up Note  Date of patient's visit: 5/16/2021  Patient's Name:  Galdino Kessler YOB: 1965            Patient Care Team:  Marcus Dumont MD as PCP - General (Internal Medicine)  Marcus Dumont MD as PCP - [facility] Provider    REASON FOR VISIT:  Routine outpatient follow up    HISTORY OF PRESENT ILLNESS:      Chief Complaint   Patient presents with    Pre-op Exam     surgical clearance needed for R Total Hip Arthroplasty, scheduled for 5/17/21       History was obtained from the patient. Cristina Do is a 50 y.o. male here today for medical clearance for right total hip arthroplasty. Due to patients previous cardiac history, NSTEMI in 2019 and CAD. Patient instructed he needs to complete cardiac testing prior to medical clearance. Patient Active Problem List   Diagnosis    Morbidly obese (HCC)    GERD (gastroesophageal reflux disease)    HLD (hyperlipidemia)    HTN (hypertension)    KVNG (obstructive sleep apnea)    Anaphylactic reaction    Angioedema    Tongue swelling    Septic arthritis of ankle (HCC)    Septic arthritis of left ankle (HCC)    Acute idiopathic gout of left ankle    Chest pain    NSTEMI (non-ST elevated myocardial infarction) ([facility] [address]    Tobacco abuse    Coronary artery disease involving native coronary artery with angina pectoris [facility])       Health Maintenance Due   Topic Date Due    Hepatitis C screen  Never done    COVID-19 Vaccine (1) Never done    HIV screen  Never done    DTaP/Tdap/Td vaccine (1 - Tdap) Never done    Shingles Vaccine (1 of 2) Never done    Colon cancer screen colonoscopy  Never done       MEDICATIONS:      Current Outpatient Medications   Medication Sig Dispense Refill    oxyCODONE-acetaminophen (PERCOCET) 5-325 MG per tablet Take 2 tablets by mouth every 6 hours as needed for Pain for up to 30 days. Intended supply: 7 days.  Take lowest dose possible to manage pain 120 tablet 0    lansoprazole (PREVACID) 15 MG delayed release capsule Take 2 capsules by mouth daily Taking OTC medication 30 capsule 3    amLODIPine (NORVASC) 10 MG tablet Take 1 tablet by mouth daily 30 tablet 3    atorvastatin (LIPITOR) 40 MG tablet Take 1 tablet by mouth daily 30 tablet 3    aspirin 325 MG tablet Take 1 tablet by mouth daily 30 tablet 3    metoprolol tartrate (LOPRESSOR) 25 MG tablet Take 1 tablet by mouth 2 times daily 60 tablet 3    acetaminophen (TYLENOL) 500 MG tablet Take 2 tablets by mouth 3 times daily 540 tablet 1    Blood Pressure KIT 1 kit by Does not apply route daily 1 kit 0    nitroGLYCERIN (NITROSTAT) 0.4 MG SL tablet up to max of 3 total doses. If no relief after 1 dose, call 911. 25 tablet 3     No current facility-administered medications for this visit. ALLERGIES:      Allergies   Allergen Reactions    Lisinopril Anaphylaxis         SOCIAL HISTORY    Reviewed and no change from previous record. Gopi Guevara  reports that he quit smoking about 6 years ago. His smoking use included cigars. He has never used smokeless tobacco.    FAMILY HISTORY:    Reviewed and No change from previous visit    REVIEW OF SYSTEMS:    Review of Systems   Constitutional: Negative for appetite change, diaphoresis, fatigue, fever and unexpected weight change. HENT: Negative for ear pain, postnasal drip, rhinorrhea, sinus pain, sore throat and trouble swallowing. Eyes: Negative for visual disturbance. Respiratory: Negative for cough, chest tightness, shortness of breath and wheezing. Cardiovascular: Negative for chest pain, palpitations and leg swelling. Gastrointestinal: Negative for abdominal pain, blood in stool, constipation, diarrhea, nausea and vomiting. Endocrine: Negative for polydipsia, polyphagia and polyuria. Genitourinary: Negative for difficulty urinating, dysuria and flank pain. Musculoskeletal: Negative for arthralgias and myalgias. Skin: Negative for rash and wound. Neurological: Negative for dizziness, syncope and weakness. All other systems reviewed and are negative.       PHYSICAL EXAM:      Vitals:    05/04/21 0957   BP: 130/74   Pulse: 74   Temp: 98.2 °F (36.8 °C)   SpO2: 96%   Weight: (!) 350 lb (158.8 kg)   Height: 5' 9\" (1.753 m)     BP Readings from Last 3 Encounters:   05/04/21 130/74   03/09/21 136/70   02/17/21 (!) 160/102      Wt Readings from Last 3 Encounters:   05/13/21 (!) 345 lb (156.5 kg)   05/04/21 (!) 350 lb (158.8 kg)   03/26/21 (!) 340 lb (154.2 kg)       Physical Exam  Vitals reviewed. Constitutional:       Appearance: Normal appearance. He is obese. HENT:      Head: Normocephalic. Neck:      Vascular: No carotid bruit. Cardiovascular:      Rate and Rhythm: Normal rate and regular rhythm. Pulses: Normal pulses. Heart sounds: Normal heart sounds. Pulmonary:      Effort: Pulmonary effort is normal.      Breath sounds: Normal breath sounds. Abdominal:      General: Bowel sounds are normal.      Palpations: Abdomen is soft. Musculoskeletal:         General: Tenderness (R hip, impaired gait due to osteoarthritis) present. No swelling or deformity. Cervical back: Normal range of motion. Skin:     General: Skin is warm and dry. Neurological:      General: No focal deficit present. Mental Status: He is alert and oriented to person, place, and time. Psychiatric:         Mood and Affect: Mood normal.         Behavior: Behavior normal.         Thought Content: Thought content normal.         Judgment: Judgment normal.          LABORATORY FINDINGS:    CBC:  Lab Results   Component Value Date    WBC 11.8 02/18/2021    HGB 16.2 02/18/2021     02/18/2021       BMP:    Lab Results   Component Value Date     02/18/2021    K 4.4 02/18/2021     02/18/2021    CO2 25 02/18/2021    BUN 14 02/18/2021    CREATININE 0.95 02/18/2021    GLUCOSE 77 02/18/2021       HEMOGLOBIN A1C:   Lab Results   Component Value Date    LABA1C 5.6 02/18/2021       FASTING LIPID PANEL:  Lab Results   Component Value Date    CHOL 220 (H) 02/18/2021    HDL 31 (L) 02/18/2021    TRIG 599 (H) 02/18/2021       ASSESSMENT AND PLAN:      1. Pre-operative clearance  - Brain Natriuretic Peptide;  Future  - clearance pending cardiac testing    2. Osteoarthritis of one hip, right  - oxyCODONE-acetaminophen (PERCOCET) 5-325 MG per tablet; Take 2 tablets by mouth every 6 hours as needed for Pain for up to 30 days. Intended supply: 7 days. Take lowest dose possible to manage pain  Dispense: 120 tablet; Refill: 0    3. Coronary artery disease involving native coronary artery with angina pectoris, unspecified whether native or transplanted heart [facility])  - Cardiac Stress Test - w/Pharm; Future  - EKG 12 lead; Future    4. NSTEMI (non-ST elevated myocardial infarction) [facility])  - Cardiac Stress Test - w/Pharm; Future  - EKG 12 lead; Future      FOLLOW UP AND INSTRUCTIONS:   Return if symptoms worsen or fail to improve. Arin Maynard received counseling on the following healthy behaviors: nutrition, exercise and medication adherence    Discussed use, benefit, and side effects of prescribed medications. Barriers to medication compliance addressed. All patient questions answered. Patient voiced understanding. Patient given educational materials - see patient instructions    GILES Smith - CNP   [facility]  5/16/2021, 7:48 PM    Please note that this chart was generated using voice recognition Dragon dictation software. Although everyeffort was made to ensure the accuracy of this automated transcription, some errors in transcription may have occurred.

## 2021-05-10 ENCOUNTER — TELEPHONE (OUTPATIENT)
Dept: INTERNAL MEDICINE CLINIC | Age: 56
End: 2021-05-10

## 2021-05-10 NOTE — TELEPHONE ENCOUNTER
The hospital told him to check with his PCP to see which meds he should stop before he has his stress test this Thursday? Also, what was the Brain NatriureticPeptide Lab ordered for? He wants to know what this shows?

## 2021-05-10 NOTE — TELEPHONE ENCOUNTER
Dr. Maryanne Gauthier office requesting clearance. (patient seen on 5/4/21)     Stress test and ekg scheduled for 5/13/21     Surgery scheduled 5/17/21. Please fax clearance to [phone].

## 2021-05-11 NOTE — TELEPHONE ENCOUNTER
Please tell the patient I spoke with Dr. Edison Phoenix, medication to hold on morning of stress test is metoprolol. He can take his other medications.

## 2021-05-13 ENCOUNTER — HOSPITAL ENCOUNTER (OUTPATIENT)
Dept: NON INVASIVE DIAGNOSTICS | Age: 56
Discharge: HOME | End: 2021-05-13
Payer: COMMERCIAL

## 2021-05-13 ENCOUNTER — HOSPITAL ENCOUNTER (OUTPATIENT)
Dept: NUCLEAR MEDICINE | Age: 56
Discharge: HOME | End: 2021-05-15
Payer: COMMERCIAL

## 2021-05-13 VITALS — BODY MASS INDEX: 50.95 KG/M2 | WEIGHT: 315 LBS

## 2021-05-13 DIAGNOSIS — I25.119 ATHEROSCLEROSIS OF CORONARY ARTERY OF NATIVE HEART WITH ANGINA PECTORIS, UNSPECIFIED VESSEL OR LESION TYPE (HCC): ICD-10-CM

## 2021-05-13 DIAGNOSIS — I21.4 NON-ST ELEVATION MYOCARDIAL INFARCTION (NSTEMI) (HCC): ICD-10-CM

## 2021-05-13 DIAGNOSIS — I25.119 CORONARY ARTERY DISEASE INVOLVING NATIVE CORONARY ARTERY WITH ANGINA PECTORIS, UNSPECIFIED WHETHER NATIVE OR TRANSPLANTED HEART (HCC): ICD-10-CM

## 2021-05-13 DIAGNOSIS — I21.4 NSTEMI (NON-ST ELEVATED MYOCARDIAL INFARCTION) (HCC): ICD-10-CM

## 2021-05-13 LAB
LV EF: 58 %
LVEF MODALITY: NORMAL

## 2021-05-13 PROCEDURE — 78452 HT MUSCLE IMAGE SPECT MULT: CPT

## 2021-05-13 PROCEDURE — 2580000003 HC RX 258: Performed by: NURSE PRACTITIONER

## 2021-05-13 PROCEDURE — 3430000000 HC RX DIAGNOSTIC RADIOPHARMACEUTICAL: Performed by: NURSE PRACTITIONER

## 2021-05-13 PROCEDURE — 6360000002 HC RX W HCPCS: Performed by: NURSE PRACTITIONER

## 2021-05-13 PROCEDURE — 93017 CV STRESS TEST TRACING ONLY: CPT

## 2021-05-13 PROCEDURE — A9500 TC99M SESTAMIBI: HCPCS | Performed by: NURSE PRACTITIONER

## 2021-05-13 RX ORDER — AMINOPHYLLINE DIHYDRATE 25 MG/ML
50 INJECTION, SOLUTION INTRAVENOUS PRN
Status: DISCONTINUED | OUTPATIENT
Start: 2021-05-13 | End: 2021-05-13 | Stop reason: ALTCHOICE

## 2021-05-13 RX ORDER — NITROGLYCERIN 0.4 MG/1
0.4 TABLET SUBLINGUAL EVERY 5 MIN PRN
Status: DISCONTINUED | OUTPATIENT
Start: 2021-05-13 | End: 2021-05-13 | Stop reason: ALTCHOICE

## 2021-05-13 RX ORDER — SODIUM CHLORIDE 9 MG/ML
500 INJECTION, SOLUTION INTRAVENOUS CONTINUOUS PRN
Status: DISCONTINUED | OUTPATIENT
Start: 2021-05-13 | End: 2021-05-13 | Stop reason: ALTCHOICE

## 2021-05-13 RX ORDER — ATROPINE SULFATE 0.1 MG/ML
0.5 INJECTION INTRAVENOUS EVERY 5 MIN PRN
Status: DISCONTINUED | OUTPATIENT
Start: 2021-05-13 | End: 2021-05-13 | Stop reason: ALTCHOICE

## 2021-05-13 RX ORDER — METOPROLOL TARTRATE 5 MG/5ML
5 INJECTION INTRAVENOUS EVERY 5 MIN PRN
Status: DISCONTINUED | OUTPATIENT
Start: 2021-05-13 | End: 2021-05-13 | Stop reason: ALTCHOICE

## 2021-05-13 RX ORDER — SODIUM CHLORIDE 0.9 % (FLUSH) 0.9 %
10 SYRINGE (ML) INJECTION PRN
Status: DISCONTINUED | OUTPATIENT
Start: 2021-05-13 | End: 2021-05-16 | Stop reason: HOSPADM

## 2021-05-13 RX ORDER — SODIUM CHLORIDE 0.9 % (FLUSH) 0.9 %
5-40 SYRINGE (ML) INJECTION PRN
Status: DISCONTINUED | OUTPATIENT
Start: 2021-05-13 | End: 2021-05-13 | Stop reason: ALTCHOICE

## 2021-05-13 RX ORDER — ALBUTEROL SULFATE 90 UG/1
2 AEROSOL, METERED RESPIRATORY (INHALATION) PRN
Status: DISCONTINUED | OUTPATIENT
Start: 2021-05-13 | End: 2021-05-13 | Stop reason: ALTCHOICE

## 2021-05-13 RX ADMIN — TETRAKIS(2-METHOXYISOBUTYLISOCYANIDE)COPPER(I) TETRAFLUOROBORATE 18 MILLICURIE: 1 INJECTION, POWDER, LYOPHILIZED, FOR SOLUTION INTRAVENOUS at 08:25

## 2021-05-13 RX ADMIN — REGADENOSON 0.4 MG: 0.08 INJECTION, SOLUTION INTRAVENOUS at 10:20

## 2021-05-13 RX ADMIN — SODIUM CHLORIDE, PRESERVATIVE FREE 10 ML: 5 INJECTION INTRAVENOUS at 10:15

## 2021-05-13 RX ADMIN — SODIUM CHLORIDE, PRESERVATIVE FREE 10 ML: 5 INJECTION INTRAVENOUS at 08:27

## 2021-05-13 RX ADMIN — SODIUM CHLORIDE, PRESERVATIVE FREE 10 ML: 5 INJECTION INTRAVENOUS at 10:27

## 2021-05-13 RX ADMIN — SODIUM CHLORIDE, PRESERVATIVE FREE 10 ML: 5 INJECTION INTRAVENOUS at 10:20

## 2021-05-13 RX ADMIN — TETRAKIS(2-METHOXYISOBUTYLISOCYANIDE)COPPER(I) TETRAFLUOROBORATE 43 MILLICURIE: 1 INJECTION, POWDER, LYOPHILIZED, FOR SOLUTION INTRAVENOUS at 10:27

## 2021-05-13 NOTE — PROCEDURES
[address]                  [address] [facility], [facility] [address]                              CARDIAC STRESS TEST    PATIENT NAME: Shantel Alcocer                  :        1965  MED REC NO:   [id number]                             ROOM:  ACCOUNT NO:   [de-identified]                           ADMIT DATE: 2021  PROVIDER:     Anup Ramirez    DATE OF STUDY:  2021    ORDERING PROVIDER:  Danyell Vasquez CNP  PRIMARY CARE PROVIDER:  Flaca Diaz MD  INTERPRETING PHYSICIAN:  Anup Ramirez MD    LEXISCAN STRESS STUDY:  Indication:  Pre-op  Procedure explained and consent signed    Medications:  Lexiscan, 0.4 mg  Resting heart rate:  77 bpm  Resting blood pressure:  199/99 mm/Hg  Infusion heart rate:   96 bpm  Infusion blood pressure:  223/95 mm/Hg  Resting EKG:  Normal  Stress heart response:  Normal response  Stress EKG(S):  No changes seen  Chest discomfort:  None  Ischemic EKG changes:  None    IMPRESSION:  Electrocardiographically negative Lexiscan stress study. Radio-isotope  results to follow from the department of Nuclear Medicine.             [facility]    D: 2021 16:14:03       T: 2021 16:14:57     /LATRELL  Job#: [id number]     Doc#: Unknown    CC:    ()

## 2021-05-14 ENCOUNTER — HOSPITAL ENCOUNTER (OUTPATIENT)
Age: 56
Setting detail: SPECIMEN
Discharge: HOME | End: 2021-05-14
Payer: COMMERCIAL

## 2021-05-14 DIAGNOSIS — Z01.818 PRE-OPERATIVE CLEARANCE: ICD-10-CM

## 2021-05-14 LAB
BNP INTERPRETATION: NORMAL
PRO-BNP: 34 PG/ML

## 2021-05-14 NOTE — TELEPHONE ENCOUNTER
Dr. Bharat Browne would like to know if you can look at the additional testing they did for the clearance. Please let me know if patient is cleared. They are suppose to get surgery on Monday.        Thanks

## 2021-05-14 NOTE — TELEPHONE ENCOUNTER
Not seen patient   Narendra Michael is off   Seems like Stress test is negative   C  Please ask patient , Does he has Any Chest pain , SOB< what is his Functional Capacity , if can Walk one block easily without SOB/Chest Pain   cleared for surgergy with Intermediate Risk

## 2021-05-15 DIAGNOSIS — K21.9 GASTROESOPHAGEAL REFLUX DISEASE, UNSPECIFIED WHETHER ESOPHAGITIS PRESENT: Primary | ICD-10-CM

## 2021-05-16 ASSESSMENT — ENCOUNTER SYMPTOMS
CONSTIPATION: 0
WHEEZING: 0
ABDOMINAL PAIN: 0
CHEST TIGHTNESS: 0
SHORTNESS OF BREATH: 0
DIARRHEA: 0
NAUSEA: 0
BLOOD IN STOOL: 0
VOMITING: 0
SORE THROAT: 0
RHINORRHEA: 0
SINUS PAIN: 0
COUGH: 0
TROUBLE SWALLOWING: 0

## 2021-05-17 RX ORDER — LANSOPRAZOLE 15 MG/1
CAPSULE, DELAYED RELEASE ORAL
Qty: 30 CAPSULE | Refills: 3 | Status: SHIPPED | OUTPATIENT
Start: 2021-05-17 | End: 2021-11-24

## 2021-06-04 DIAGNOSIS — I10 ESSENTIAL HYPERTENSION: ICD-10-CM

## 2021-06-07 DIAGNOSIS — M16.11 OSTEOARTHRITIS OF ONE HIP, RIGHT: ICD-10-CM

## 2021-06-07 DIAGNOSIS — I10 ESSENTIAL HYPERTENSION: ICD-10-CM

## 2021-06-07 RX ORDER — ATORVASTATIN CALCIUM 40 MG/1
TABLET, FILM COATED ORAL
Qty: 30 TABLET | Refills: 3 | Status: SHIPPED | OUTPATIENT
Start: 2021-06-07 | End: 2021-11-08

## 2021-06-07 RX ORDER — OXYCODONE HYDROCHLORIDE AND ACETAMINOPHEN 5; 325 MG/1; MG/1
2 TABLET ORAL EVERY 6 HOURS PRN
Qty: 120 TABLET | Refills: 0 | OUTPATIENT
Start: 2021-06-07 | End: 2021-07-07

## 2021-06-07 RX ORDER — AMLODIPINE BESYLATE 10 MG/1
TABLET ORAL
Qty: 30 TABLET | Refills: 3 | Status: SHIPPED | OUTPATIENT
Start: 2021-06-07 | End: 2021-11-08

## 2021-11-07 DIAGNOSIS — I10 ESSENTIAL HYPERTENSION: ICD-10-CM

## 2021-11-08 RX ORDER — ATORVASTATIN CALCIUM 40 MG/1
TABLET, FILM COATED ORAL
Qty: 30 TABLET | Refills: 3 | Status: SHIPPED | OUTPATIENT
Start: 2021-11-08 | End: 2022-05-12

## 2021-11-08 RX ORDER — AMLODIPINE BESYLATE 10 MG/1
TABLET ORAL
Qty: 30 TABLET | Refills: 3 | Status: SHIPPED | OUTPATIENT
Start: 2021-11-08 | End: 2022-04-14

## 2021-11-19 NOTE — PLAN OF CARE
AUGUST ambulatory encounter  FAMILY PRACTICE OFFICE VISIT    CHIEF COMPLAINT:    Chief Complaint   Patient presents with   â¢ Follow-up   â¢ Office Visit   â¢ Imm/Inj     Flu vaccine        SUBJECTIVE:  Robby Siddiqui is a 69year old male who presents today for follow-up of multiple issues. Following with cardiology and with endocrinology, and urology. Left knee pain-states that still present but he is tolerating this. He is actively trying to lose weight and exercise and has been doing intermittent fasting. He continues to have continued bloating of the abdomen which has been ongoing. No changes to stool, nausea, vomiting, diarrhea, unintentional weight gain or weight loss, or diet. Bilateral groin rash going on for about 6 months. Partially effective with OTC anti-fungal medications. States that in the past when it has become this bad has needed to use fluconazole.      PROBLEM LIST:    Patient Active Problem List   Diagnosis   â¢ Mixed hyperlipidemia   â¢ Uncontrolled type 2 diabetes mellitus without complication, with long-term current use of insulin   â¢ Obesity, unspecified   â¢ Coronary artery disease involving native coronary artery of native heart without angina pectoris   â¢ LATTICE DEGENERATION OD   â¢ Other dermatitis due to solar radiation   â¢ Actinic keratoses   â¢ History of basal cell carcinoma tmple 2004, left arm 85   â¢ Freckled skin   â¢ Granuloma annulare   â¢ Seborrheic dermatitis, unspecified   â¢ Hammertoe   â¢ Sensorineural hearing loss, bilateral   â¢ Persistent cough   â¢ Mild obstructive sleep apnea   â¢ Tracheobronchomalacia   â¢ Uncomplicated asthma   â¢ Tendonitis, Achilles, right   â¢ Tendonitis, Achilles, left   â¢ IDDM (insulin dependent diabetes mellitus)   â¢ Obesity, Class III, BMI 40-49.9 (morbid obesity) (CMS/[facility])   â¢ Encounter for weight loss counseling   â¢ Iron deficiency   â¢ Essential hypertension with goal blood pressure less than 140/90   â¢ Obesity (BMI 30-39.9)   â¢ Sebaceous cyst Problem: Falls - Risk of  Goal: Absence of falls  Outcome: Met This Shift  Pt remains free of falls. Call light within reach. Bed in lowest position. Nonskid footwear applied. Bedwheels locked. â¢ Pilar cyst   â¢ Pseudophakia   â¢ Chronic pain of left knee       PAST HISTORIES:  Past Medical History:   Diagnosis Date   â¢ Acute bronchitis     Feb 2014   â¢ Acute UTI 05/18/2017    Symptomatic STAPHYLOCOCCUS EPIDERMIDIS UTI   â¢ Anemia    â¢ BCC (basal cell carcinoma of skin) 1985 & 1/05     left forearm and forehead    â¢ BPH (benign prostatic hyperplasia)    â¢ Bronchiectasis (CMS/HCC) 1972    left lower lobe, also pneumonia   â¢ Cervical spondylosis    â¢ Chondromalacia 9/8/2005   â¢ Coronary artery disease involving native coronary artery of native heart without angina pectoris 05/27/2011    PTCA/Stent--3.0 X 23 Promus JOHNNY->mid-RCA 5/27/2011  LHC--significant 2-vessel CAD (LAD, Cx); Est EF 60%                                     Stents:  2.75 x 23 Promus JOHNNY->proximal LAD;2.5 x 18 Promus JOHNNY->OM1                   Angioplasty:  Mid-ramus intermedius    â¢ DEPRESSIVE DISORDER NEC 2/19/2007   â¢ Diabetes (CMS/HCC)     type 2   â¢ Elevated prostate specific antigen (PSA) 7/11/06    PSA of 3.66   â¢ Esophageal reflux    â¢ Essential hypertension with goal blood pressure less than 140/90 2000   â¢ GEN OSTEOARTHROS-HAND 10/29/2008   â¢ GI bleed 2/14    Antral gastritis.  Transfused 2 units   â¢ HI MYOPIA 4/13/2007   â¢ HYPERTROPHY PROSTATE WITH OBST 8/24/2006   â¢ LATTICE DEGENERATION OD 5/16/2003   â¢ Mixed hyperlipidemia 3/2/2005   â¢ OBESITY NOS 3/15/2006   â¢ Other specified gastritis with hemorrhage 2/7/14   â¢ RETENTION OF URINE UNSPEC 11/22/2005   â¢ SENILE NUCLEAR CATARACT 5/16/2003   â¢ Sleep apnea     CPAP   â¢ SPONDYLOS NOS W/O MYELOP 7/31/2006   â¢ TMJ syndrome     uses splint   â¢ Tracheobronchomalacia 4/20/2016   â¢ Type II or unspecified type diabetes mellitus without mention of complication, uncontrolled 00/70/83   â¢ Uncomplicated asthma 9/93/0824    +ve MCT   â¢ Unspecified asthma(493.90) 1985    Asthma     Past Surgical History:   Procedure Laterality Date   â¢ Basal cell carcinoma excision  01/05/2005    Right temple   â¢ Basal cell carcinoma excision  04/11/1985    left forearm   â¢ Basal cell carcinoma excision  10/2015    Upper back   â¢ Biopsy of prostate,needle/punch  8/17/06    84 gm, PSAD of 0.04, all BPH   â¢ Bronchoscopy  05/2016    Dr. Marii Chicas   â¢ Cardiac catherization  05/27/2011    Dr. Val Qiu @ [facility] FACILITY: Significant 2-vessel CAD (LAD, Cx); Est EF 60%; status post drug-eluting stent placement in the mid LAD and 1st marginal branch, status post PTCA only of the ramus intermedius branch   â¢ Cardiac catherization  02/12/2020    Dr. Val Qiu @ [facility]: [place] lesion in the mid 1st marginal branch that was 75% and diffuse. This lesion was successfully treated with a stent. Patent drug-eluting stent in the proximal LAD and ostial/proximal 1st marginal branch.  LVEF 60%   â¢ Cataract extraction w/ intraocular lens  implant, bilateral  2018 April and May   â¢ Colonoscopy  2/7/14    erossisve antral gastritis w oozing blood L colon   â¢ Colonoscopy w/ biopsies  8/1/13    benign polyp and tubular adenoma, 3 yr f/u, Dr. Ely Acosta   â¢ Coronary angioplasty  05/27/2011    PTCA only of the mid-ramus intermedius (small vessel size)   â¢ Coronary stent placement  05/27/2011    -->proximal LAD & OM1; 2.75 x 23 Promus drug-eluting stent & 2.5 x 18 Promus drug-eluting stent   â¢ Coronary stent placement  05/27/2011    -->proximal LAD; 2.75 x 23 mm Promus drug-eluting stent   â¢ Coronary stent placement  05/27/2011    -->OM1; 2.5 x 18 mm Promus drug-eluting stent    â¢ Coronary stent placement  02/12/2020    -->OM1; 2.25 x 32 mm synergy drug-eluting stent   â¢ Cystoscopy  02/21/2019    CYSTOSCOPY WITH PROSTATIC URETHRAL LIFT @ [facility]   â¢ Extracapsular cataract removal w insert io lens prosth complex wo ecp Right 04/19/2018    SN60WF 12.0 Dr. Laurie De Dios   â¢ Extracapsular cataract removal w insert io lens prosth complex wo ecp Left     SN60WF 11.5 Dr Jennifer Cunha   â¢ Fistulectomy / otomy pil cyst steve rectal  08/01/2013   â¢ Ganglion cyst excision  1983   â¢ Lipoma resection      Lower back   â¢ Removal of tonsils,<15 y/o  1956    Tonsillectomy Alone   â¢ Remv lens material,aspiratn Right 2018    lens fragment removal - Dr hayden Traylor Spoon   â¢ Steroid injection knee      left knee cortisone injections   â¢ Tonsillectomy and adenoidectomy     â¢ Upper gastrointestinal endoscopy  16   â¢ [place] tooth extraction  1975     Family History   Problem Relation Age of Onset   â¢ Ophthalmology Paternal Grandmother         glaucoma   â¢ Hypertension Mother    â¢ Congestive Heart Failure Father    â¢ Heart disease Father    â¢ Genitourinary Other         no   â¢ Cancer Paternal Uncle         colon     Social History     Socioeconomic History   â¢ Marital status: /Civil Union     Spouse name: Yoel Mccollum Number of children: 2   â¢ Years of education: 25   â¢ Highest education level: Not on file   Occupational History   â¢ Occupation: Retired [employer]     Employer: [employer] [place]     Comment: denies exposures     Employer: [employer]   Tobacco Use   â¢ Smoking status: Former Smoker     Packs/day: 0.80     Years: 2.00     Pack years: 1.60     Types: Cigarettes     Quit date: 1970     Years since quittin.9   â¢ Smokeless tobacco: Never Used   â¢ Tobacco comment: IN COLLEGE 2 year . 75 pack a day   Substance and Sexual Activity   â¢ Alcohol use: Yes     Comment: rare one beer every 90 days   â¢ Drug use: No   â¢ Sexual activity: Yes     Partners: Female     Birth control/protection: Surgical     Comment: Hysterectomy   Other Topics Concern   â¢ Not on file   Social History Narrative   â¢ Not on file     Social Determinants of Health     Financial Resource Strain:    â¢ Social Determinants: Financial Resource Strain: Not on file   Food Insecurity:    â¢ Social Determinants: Food Insecurity: Not on file   Transportation Needs:    â¢ Lack of Transportation (Medical): Not on file   â¢ Lack of Transportation (Non-Medical):  Not on file   Physical Activity:    â¢ Days of Exercise per "Week: Not on file   â¢ Minutes of Exercise per Session: Not on file   Stress:    â¢ Social Determinants: Stress: Not on file   Social Connections:    â¢ Social Determinants: Social Connections: Not on file   Intimate Partner Violence:    â¢ Social Determinants: Intimate Partner Violence Past Fear: Not on file   â¢ Social Determinants: Intimate Partner Violence Current Fear: Not on file         Objective:    Physical Exam:    Visit Vitals  /74   Pulse 74   Ht 5' 9"" (1.753 m)   Wt 119.3 kg (263 lb)   SpO2 97%   BMI 38.84 kg/mÂ²     Physical Exam  Constitutional:       General: He is not in acute distress. Appearance: He is not ill-appearing. Cardiovascular:      Rate and Rhythm: Normal rate and regular rhythm. Heart sounds: No murmur heard. No friction rub. No gallop. Pulmonary:      Effort: Pulmonary effort is normal. No respiratory distress. Breath sounds: Normal breath sounds. No stridor. No wheezing or rhonchi. Abdominal:      General: Abdomen is flat. Bowel sounds are normal. There is no distension. Palpations: Abdomen is soft. There is no mass. Tenderness: There is no abdominal tenderness. Hernia: No hernia is present. Psychiatric:         Mood and Affect: Mood normal.         Behavior: Behavior normal.         Thought Content:  Thought content normal.         Judgment: Judgment normal.           LAB RESULTS:  Lab Services on 11/19/2021   Component Date Value Ref Range Status   â¢ Prostate Specific Antigen 11/19/2021 0.50  <=6.50 ng/mL Final    [employer] Chemiluminescence   â¢ Sodium 11/19/2021 136  135 - 145 mmol/L Final   â¢ Potassium 11/19/2021 4.5  3.4 - 5.1 mmol/L Final   â¢ Chloride 11/19/2021 105  98 - 107 mmol/L Final   â¢ Carbon Dioxide 11/19/2021 25  21 - 32 mmol/L Final   â¢ Anion Gap 11/19/2021 11  10 - 20 mmol/L Final   â¢ Glucose 11/19/2021 181* 70 - 99 mg/dL Final   â¢ BUN 11/19/2021 28* 6 - 20 mg/dL Final   â¢ Creatinine 11/19/2021 1.21* 0.67 - 1.17 mg/dL Final " â¢ Glomerular Filtration Rate 11/19/2021 59* >=60 Final    eGFR 30-59 mL/min/1.73m2 = Moderate decrease in kidney function. Stage 3 CKD (chronic kidney disease) or moderate kidney disease. Estimated GFR calculated using the 2009 CKD-EPI creatinine equation. â¢ BUN/ Creatinine Ratio 11/19/2021 23  7 - 25 Final   â¢ Calcium 11/19/2021 9.5  8.4 - 10.2 mg/dL Final   Office Visit on 11/19/2021   Component Date Value Ref Range Status   â¢ COLOR, URINALYSIS 11/19/2021 Yellow   Final   â¢ APPEARANCE, URINALYSIS 11/19/2021 Clear   Final   â¢ GLUCOSE, URINALYSIS 11/19/2021 Negative  Negative mg/dL Final   â¢ BILIRUBIN, URINALYSIS 11/19/2021 Negative  Negative Final   â¢ KETONES, URINALYSIS 11/19/2021 Negative  Negative mg/dL Final   â¢ SPECIFIC GRAVITY, URINALYSIS 11/19/2021 1.020  1.005 - 1.030 Final   â¢ OCCULT BLOOD, URINALYSIS 11/19/2021 Negative  Negative Final   â¢ PH, URINALYSIS 11/19/2021 6.0  5.0 - 7.0 Final   â¢ PROTEIN, URINALYSIS 11/19/2021 Negative  Negative mg/dL Final   â¢ UROBILINOGEN, URINALYSIS 11/19/2021 0.2  0.2, 1.0 mg/dL Final   â¢ NITRITE, URINALYSIS 11/19/2021 Negative  Negative Final   â¢ LEUKOCYTE ESTERASE, URINALYSIS 11/19/2021 Negative  Negative Final       ASSESSMENT:       1. Bloating    2. Need for vaccination    3. Increased urinary frequency    4. Dermatophytosis of groin and perianal area        PLAN:     Orders Placed This Encounter   â¢ XR Abdomen 1 vw KUB Supine   â¢ Influenza Quadrivalent Enhanced High Dose Split Pres Free 0.7 mL Pre-filled Vacc (Fluzone High Dose Quad; ages 65+ yrs) - [id number]   â¢ Urinalysis & Reflex Microscopy With Culture If Indicated   â¢ PSA   â¢ Basic Metabolic Panel   â¢ fluconazole (Diflucan) 150 MG tablet     Has been feeling more bloated since starting intermittent fasting. Wonder if constipation is contributing and will get a KUB. Consider Fodmap diet, seeing a dietician or following up with GI.     Patient aware of concerning signs and symptoms and when to seek appropriate medical care or go to the ED. Likely jock itch/candidial intertrigo and will prescribe fluconazole 150mg weekly x4 weeks. Continue to keep area try. Recommend to discuss his urinary issues with his urologist.  Will get a UA today. PSA and BMP ordered    The patient indicated understanding of the diagnosis and agreed with the plan of care. Will return to clinic for any new or worsening symptoms.   Â

## 2021-11-23 DIAGNOSIS — K21.9 GASTROESOPHAGEAL REFLUX DISEASE, UNSPECIFIED WHETHER ESOPHAGITIS PRESENT: ICD-10-CM

## 2021-11-24 RX ORDER — LANSOPRAZOLE 15 MG/1
CAPSULE, DELAYED RELEASE ORAL
Qty: 30 CAPSULE | Refills: 3 | Status: SHIPPED | OUTPATIENT
Start: 2021-11-24

## 2021-11-28 ENCOUNTER — HOSPITAL ENCOUNTER (EMERGENCY)
Age: 56
Discharge: HOME | End: 2021-11-28
Attending: EMERGENCY MEDICINE
Payer: COMMERCIAL

## 2021-11-28 ENCOUNTER — APPOINTMENT (OUTPATIENT)
Dept: CT IMAGING | Age: 56
End: 2021-11-28
Payer: COMMERCIAL

## 2021-11-28 VITALS
TEMPERATURE: 99.1 F | SYSTOLIC BLOOD PRESSURE: 158 MMHG | OXYGEN SATURATION: 97 % | RESPIRATION RATE: 21 BRPM | HEART RATE: 94 BPM | DIASTOLIC BLOOD PRESSURE: 102 MMHG

## 2021-11-28 DIAGNOSIS — G51.0 BELL'S PALSY: Primary | ICD-10-CM

## 2021-11-28 LAB
% CKMB: 1.5 % (ref 0–3.5)
ABSOLUTE EOS #: 0.31 K/UL (ref 0–0.44)
ABSOLUTE IMMATURE GRANULOCYTE: 0.04 K/UL (ref 0–0.3)
ABSOLUTE LYMPH #: 2.31 K/UL (ref 1.1–3.7)
ABSOLUTE MONO #: 0.87 K/UL (ref 0.1–1.2)
ALLEN TEST: NORMAL
ANION GAP SERPL CALCULATED.3IONS-SCNC: 16 MMOL/L (ref 9–17)
ANION GAP: 11 MMOL/L (ref 7–16)
BASOPHILS # BLD: 1 % (ref 0–2)
BASOPHILS ABSOLUTE: 0.09 K/UL (ref 0–0.2)
BUN BLDV-MCNC: 10 MG/DL (ref 6–20)
BUN/CREAT BLD: ABNORMAL (ref 9–20)
CALCIUM SERPL-MCNC: 9.4 MG/DL (ref 8.6–10.4)
CHLORIDE BLD-SCNC: 103 MMOL/L (ref 98–107)
CK MB: 1.2 NG/ML
CKMB INTERPRETATION: ABNORMAL
CO2: 22 MMOL/L (ref 20–31)
CREAT SERPL-MCNC: 0.72 MG/DL (ref 0.7–1.2)
DIFFERENTIAL TYPE: ABNORMAL
EOSINOPHILS RELATIVE PERCENT: 3 % (ref 1–4)
FIO2: NORMAL
GFR AFRICAN AMERICAN: >60 ML/MIN
GFR NON-AFRICAN AMERICAN: >60 ML/MIN
GFR NON-AFRICAN AMERICAN: >60 ML/MIN
GFR SERPL CREATININE-BSD FRML MDRD: >60 ML/MIN
GFR SERPL CREATININE-BSD FRML MDRD: ABNORMAL ML/MIN/{1.73_M2}
GFR SERPL CREATININE-BSD FRML MDRD: ABNORMAL ML/MIN/{1.73_M2}
GFR SERPL CREATININE-BSD FRML MDRD: NORMAL ML/MIN/{1.73_M2}
GLUCOSE BLD-MCNC: 123 MG/DL (ref 74–100)
GLUCOSE BLD-MCNC: 128 MG/DL (ref 70–99)
HCO3 VENOUS: 28.8 MMOL/L (ref 22–29)
HCT VFR BLD CALC: 44.6 % (ref 40.7–50.3)
HEMOGLOBIN: 14.9 G/DL (ref 13–17)
IMMATURE GRANULOCYTES: 0 %
INR BLD: 1
LACTIC ACID, WHOLE BLOOD: 2.6 MMOL/L (ref 0.7–2.1)
LYMPHOCYTES # BLD: 20 % (ref 24–43)
MCH RBC QN AUTO: 27.5 PG (ref 25.2–33.5)
MCHC RBC AUTO-ENTMCNC: 33.4 G/DL (ref 28.4–34.8)
MCV RBC AUTO: 82.3 FL (ref 82.6–102.9)
MODE: NORMAL
MONOCYTES # BLD: 7 % (ref 3–12)
MYOGLOBIN: 30 NG/ML (ref 28–72)
NEGATIVE BASE EXCESS, VEN: NORMAL (ref 0–2)
NRBC AUTOMATED: 0 PER 100 WBC
O2 DEVICE/FLOW/%: NORMAL
O2 SAT, VEN: 77 % (ref 60–85)
PARTIAL THROMBOPLASTIN TIME: 22.3 SEC (ref 20.5–30.5)
PATIENT TEMP: NORMAL
PCO2, VEN: 46.6 MM HG (ref 41–51)
PDW BLD-RTO: 13.3 % (ref 11.8–14.4)
PH VENOUS: 7.4 (ref 7.32–7.43)
PLATELET # BLD: 354 K/UL (ref 138–453)
PLATELET ESTIMATE: ABNORMAL
PMV BLD AUTO: 9.6 FL (ref 8.1–13.5)
PO2, VEN: 41.7 MM HG (ref 30–50)
POC BUN: 10 MG/DL (ref 8–26)
POC CHLORIDE: 106 MMOL/L (ref 98–107)
POC CREATININE: 0.94 MG/DL (ref 0.51–1.19)
POC HEMATOCRIT: 49 % (ref 41–53)
POC HEMOGLOBIN: 16.6 G/DL (ref 13.5–17.5)
POC IONIZED CALCIUM: 1.19 MMOL/L (ref 1.15–1.33)
POC LACTIC ACID: 2.23 MMOL/L (ref 0.56–1.39)
POC PCO2 TEMP: NORMAL MM HG
POC PH TEMP: NORMAL
POC PO2 TEMP: NORMAL MM HG
POC POTASSIUM: 4.1 MMOL/L (ref 3.5–4.5)
POC SODIUM: 145 MMOL/L (ref 138–146)
POC TCO2: 29 MMOL/L (ref 22–30)
POSITIVE BASE EXCESS, VEN: 3 (ref 0–3)
POTASSIUM SERPL-SCNC: 4.1 MMOL/L (ref 3.7–5.3)
PROTHROMBIN TIME: 10.6 SEC (ref 9.1–12.3)
RBC # BLD: 5.42 M/UL (ref 4.21–5.77)
RBC # BLD: ABNORMAL 10*6/UL
SAMPLE SITE: NORMAL
SEG NEUTROPHILS: 69 % (ref 36–65)
SEGMENTED NEUTROPHILS ABSOLUTE COUNT: 8.09 K/UL (ref 1.5–8.1)
SODIUM BLD-SCNC: 141 MMOL/L (ref 135–144)
TOTAL CK: 82 U/L (ref 39–308)
TOTAL CO2, VENOUS: NORMAL MMOL/L (ref 23–30)
TROPONIN INTERP: ABNORMAL
TROPONIN T: ABNORMAL NG/ML
TROPONIN, HIGH SENSITIVITY: 9 NG/L (ref 0–22)
WBC # BLD: 11.7 K/UL (ref 3.5–11.3)
WBC # BLD: ABNORMAL 10*3/UL

## 2021-11-28 PROCEDURE — 84484 ASSAY OF TROPONIN QUANT: CPT

## 2021-11-28 PROCEDURE — 82803 BLOOD GASES ANY COMBINATION: CPT

## 2021-11-28 PROCEDURE — 99284 EMERGENCY DEPT VISIT MOD MDM: CPT

## 2021-11-28 PROCEDURE — 99223 1ST HOSP IP/OBS HIGH 75: CPT | Performed by: PSYCHIATRY & NEUROLOGY

## 2021-11-28 PROCEDURE — 82565 ASSAY OF CREATININE: CPT

## 2021-11-28 PROCEDURE — 6370000000 HC RX 637 (ALT 250 FOR IP): Performed by: STUDENT IN AN ORGANIZED HEALTH CARE EDUCATION/TRAINING PROGRAM

## 2021-11-28 PROCEDURE — 82550 ASSAY OF CK (CPK): CPT

## 2021-11-28 PROCEDURE — 82553 CREATINE MB FRACTION: CPT

## 2021-11-28 PROCEDURE — 80051 ELECTROLYTE PANEL: CPT

## 2021-11-28 PROCEDURE — 85730 THROMBOPLASTIN TIME PARTIAL: CPT

## 2021-11-28 PROCEDURE — 83874 ASSAY OF MYOGLOBIN: CPT

## 2021-11-28 PROCEDURE — 85014 HEMATOCRIT: CPT

## 2021-11-28 PROCEDURE — 80048 BASIC METABOLIC PNL TOTAL CA: CPT

## 2021-11-28 PROCEDURE — 82330 ASSAY OF CALCIUM: CPT

## 2021-11-28 PROCEDURE — 82947 ASSAY GLUCOSE BLOOD QUANT: CPT

## 2021-11-28 PROCEDURE — 84520 ASSAY OF UREA NITROGEN: CPT

## 2021-11-28 PROCEDURE — 70450 CT HEAD/BRAIN W/O DYE: CPT

## 2021-11-28 PROCEDURE — 85025 COMPLETE CBC W/AUTO DIFF WBC: CPT

## 2021-11-28 PROCEDURE — 83605 ASSAY OF LACTIC ACID: CPT

## 2021-11-28 PROCEDURE — 93005 ELECTROCARDIOGRAM TRACING: CPT | Performed by: STUDENT IN AN ORGANIZED HEALTH CARE EDUCATION/TRAINING PROGRAM

## 2021-11-28 PROCEDURE — 36415 COLL VENOUS BLD VENIPUNCTURE: CPT

## 2021-11-28 PROCEDURE — 86618 LYME DISEASE ANTIBODY: CPT

## 2021-11-28 PROCEDURE — 85610 PROTHROMBIN TIME: CPT

## 2021-11-28 RX ORDER — POLYVINYL ALCOHOL, POVIDONE .5; .6 G/100ML; G/100ML
1 LIQUID OPHTHALMIC
Qty: 15 ML | Refills: 0 | Status: SHIPPED | OUTPATIENT
Start: 2021-11-28

## 2021-11-28 RX ORDER — PREDNISONE 20 MG/1
60 TABLET ORAL ONCE
Status: COMPLETED | OUTPATIENT
Start: 2021-11-28 | End: 2021-11-28

## 2021-11-28 RX ORDER — PREDNISONE 20 MG/1
60 TABLET ORAL DAILY
Qty: 18 TABLET | Refills: 0 | Status: SHIPPED | OUTPATIENT
Start: 2021-11-28 | End: 2021-12-04

## 2021-11-28 RX ADMIN — PREDNISONE 60 MG: 20 TABLET ORAL at 14:26

## 2021-11-28 NOTE — ED PROVIDER NOTES
[facility]"" ED  Emergency Department Encounter  Emergency Medicine Resident     Pt Name: Elaina Coats  MRN: [id number]  [place] 1965  Date of evaluation: 21  PCP:  GILES Welch CNP    CHIEF COMPLAINT       Chief Complaint   Patient presents with    Other     unilateral weakness       HISTORY OFPRESENT ILLNESS  (Location/Symptom, Timing/Onset, Context/Setting, Quality, Duration, Modifying Factors,Severity.)      Elaina Coats is a 50 y. o.yo male who presents with right eye problem. Patient reports that on Friday he started having difficulty closing his right eye. States that his eyes started becoming more tearful, with blurry vision. Denies any floaters. He did state that he also had numbness over his right side of his face. She denies any headache, ringing in the ear patient denies any chest pain or shortness of breath. He did state that he has not been recently sick or has had any recent sick contacts. On chart review, patient found to have CAD, hyperlipidemia, KVNG. PAST MEDICAL / SURGICAL / SOCIAL / FAMILY HISTORY      has a past medical history of Red's esophagus, GERD (gastroesophageal reflux disease), Gout, Hyperlipidemia, Hypertension, Morbid obesity ([facility] [address] and KVNG (obstructive sleep apnea).     has a past surgical history that includes Tonsillectomy; Endoscopy, colon, diagnostic; hernia repair (); and Upper gastrointestinal endoscopy (N/A, 3/11/2019). Social History     Socioeconomic History    Marital status: Single     Spouse name: Not on file    Number of children: Not on file    Years of education: Not on file    Highest education level: Not on file   Occupational History    Not on file   Tobacco Use    Smoking status: Former Smoker     Types: Cigars     Quit date:      Years since quittin.9    Smokeless tobacco: Never Used    Tobacco comment: Used to smoke black and mild cigars occasionally.    Substance and Sexual Activity    Alcohol use: Not Currently     Comment: used to be heavy drinker 15 years ago - occasionally    Drug use: Not Currently     Comment: cocaine    Sexual activity: Not on file   Other Topics Concern    Not on file   Social History Narrative    Not on file     Social Determinants of Health     Financial Resource Strain:     Difficulty of Paying Living Expenses: Not on file   Food Insecurity:     Worried About Running Out of Food in the Last Year: Not on file    [place] of Food in the Last Year: Not on file   Transportation Needs:     Lack of Transportation (Medical): Not on file    Lack of Transportation (Non-Medical): Not on file   Physical Activity:     Days of Exercise per Week: Not on file    Minutes of Exercise per Session: Not on file   Stress:     Feeling of Stress : Not on file   Social Connections:     Frequency of Communication with Friends and Family: Not on file    Frequency of Social Gatherings with Friends and Family: Not on file    Attends Episcopal Services: Not on file    Active Member of [employer] or Organizations: Not on file    Attends Club or Organization Meetings: Not on file    Marital Status: Not on file   Intimate Partner Violence:     Fear of Current or Ex-Partner: Not on file    Emotionally Abused: Not on file    Physically Abused: Not on file    Sexually Abused: Not on file   Housing Stability:     Unable to Pay for Housing in the Last Year: Not on file    Number of [place] in the Last Year: Not on file    Unstable Housing in the Last Year: Not on file       Family History   Problem Relation Age of Onset    Cancer Mother     Heart Disease Father     High Blood Pressure Father     Other Brother         parkinsons    Cancer Maternal Uncle     Diabetes Paternal Cousin         Allergies:  Lisinopril    Home Medications:  Prior to Admission medications    Medication Sig Start Date End Date Taking?  Authorizing Provider   predniSONE (DELTASONE) 20 MG tablet Take 3 tablets by mouth daily for 6 days 11/28/21 12/4/21 Yes Silvapta Epi Fermin MD   polyvinyl alcohol-povidone (ARTIFICIAL TEARS) 5-6 MG/ML SOLN opthalmic solution Place 1 drop into the right eye every 2 hours as needed for Dry Eyes 11/28/21  Yes Seven Funes MD   lansoprazole (PREVACID) 15 MG delayed release capsule take 2 capsules by mouth once daily 11/24/21   GILES Ni CNP   amLODIPine (NORVASC) 10 MG tablet take 1 tablet by mouth once daily 11/8/21   GILES Ni CNP   atorvastatin (LIPITOR) 40 MG tablet take 1 tablet by mouth once daily 11/8/21   GILES Ni CNP   metoprolol tartrate (LOPRESSOR) 25 MG tablet take 1 tablet by mouth twice a day 6/4/21   Yogi Polo MD   aspirin 325 MG tablet Take 1 tablet by mouth daily 2/17/21   GILES Ni CNP   acetaminophen (TYLENOL) 500 MG tablet Take 2 tablets by mouth 3 times daily 2/17/21   GILES Ni CNP   Blood Pressure KIT 1 kit by Does not apply route daily 2/17/21   GILES Ni CNP   nitroGLYCERIN (NITROSTAT) 0.4 MG SL tablet up to max of 3 total doses. If no relief after 1 dose, call 911. 11/21/19   Jackline Goldmann, MD       REVIEW OFSYSTEMS    (2-9 systems for level 4, 10 or more for level 5)      Review of Systems   Constitutional: Negative for fatigue and fever. Eyes: Positive for pain, redness and visual disturbance. Respiratory: Negative for cough and shortness of breath. Cardiovascular: Negative for chest pain and leg swelling. Gastrointestinal: Negative for abdominal pain and diarrhea. Musculoskeletal: Negative for back pain and gait problem. Skin: Negative for rash and wound. Neurological: Positive for numbness. Negative for facial asymmetry and headaches. Psychiatric/Behavioral: Negative for behavioral problems and confusion.        PHYSICAL EXAM   (up to 7 for level 4, 8 or more forlevel 5)      INITIAL VITALS:   ED Triage Vitals   BP Temp Temp src Pulse Resp SpO2 Height Weight   -- -- -- -- -- -- -- --       Physical Exam  Constitutional:       Appearance: He is obese. HENT:      Head: Normocephalic and atraumatic. Eyes:      General:         Right eye: No discharge. Left eye: No discharge. Extraocular Movements: Extraocular movements intact. Pupils: Pupils are equal, round, and reactive to light. Cardiovascular:      Rate and Rhythm: Normal rate. Pulmonary:      Effort: Pulmonary effort is normal. No respiratory distress. Abdominal:      General: There is no distension. Palpations: Abdomen is soft. Musculoskeletal:         General: No swelling or tenderness. Cervical back: No rigidity. Skin:     Coloration: Skin is not jaundiced or pale. Neurological:      Mental Status: He is alert. Cranial Nerves: Facial asymmetry present. Motor: No weakness.       Coordination: Coordination normal.      Gait: Gait normal.      Deep Tendon Reflexes: Reflexes normal.   Psychiatric:         Mood and Affect: Mood normal.         Behavior: Behavior normal.         DIFFERENTIAL  DIAGNOSIS     PLAN (LABS / IMAGING / EKG):  Orders Placed This Encounter   Procedures    CT Head WO Contrast    STROKE PANEL    LACTIC ACID, WHOLE BLOOD    LYME AB    ELECTROLYTES PLUS    Hemoglobin and hematocrit, blood    CALCIUM, IONIC (POC)    Eye protection patch    Venous Blood Gas, POC    Creatinine W/GFR Point of Care    POCT urea (BUN)    Lactic Acid, POC    POCT Glucose    EKG 12 Lead       MEDICATIONS ORDERED:  Orders Placed This Encounter   Medications    predniSONE (DELTASONE) tablet 60 mg    predniSONE (DELTASONE) 20 MG tablet     Sig: Take 3 tablets by mouth daily for 6 days     Dispense:  18 tablet     Refill:  0    polyvinyl alcohol-povidone (ARTIFICIAL TEARS) 5-6 MG/ML SOLN opthalmic solution     Sig: Place 1 drop into the right eye every 2 hours as needed for Dry Eyes     Dispense:  15 mL     Refill:  0 Initial MDM/Plan: 50 y.o. male who presents with right eye problem. On presentation, patient in no acute distress, stable vitals. He is not able to close his right eyes. Also he is not able to wrinkle his right forehead. Patient does have numbness over the V1 and V2 distribution.  -Impression is symptoms are likely due to Bell's palsy  -Stroke team is at bedside  -Patient will get CT scan of head  -If CT head negative, patient will be started on steroids for Bell's palsy  Also given be given eye patch  -We will have her follow-up with her ophthalmologist to ensure that there is no corneal abrasion due to inability to close eyes. DIAGNOSTIC RESULTS / EMERGENCYDEPARTMENT COURSE / MDM     LABS:  Labs Reviewed   STROKE PANEL - Abnormal; Notable for the following components:       Result Value    Glucose 128 (*)     WBC 11.7 (*)     MCV 82.3 (*)     Seg Neutrophils 69 (*)     Lymphocytes 20 (*)     All other components within normal limits   LACTIC ACID, WHOLE BLOOD - Abnormal; Notable for the following components:    Lactic Acid, Whole Blood 2.6 (*)     All other components within normal limits   LACTIC ACID,POINT OF CARE - Abnormal; Notable for the following components:    POC Lactic Acid 2.23 (*)     All other components within normal limits   POCT GLUCOSE - Abnormal; Notable for the following components:    POC Glucose 123 (*)     All other components within normal limits   ELECTROLYTES PLUS   HGB/HCT   CALCIUM, IONIC (POC)   LYME AB   VENOUS BLOOD GAS, POINT OF CARE   CREATININE W/GFR POINT OF CARE   UREA N (POC)         RADIOLOGY:  No results found. EKG      All EKG's are interpreted by the Emergency Department Physicianwho either signs or Co-signs this chart in the absence of a cardiologist.    EMERGENCY DEPARTMENT COURSE:  ED Course as of 11/29/21 1631   Sun Nov 28, 2021   1441 Patient CT scan negative for bleed. Be discharged with prednisone treat for Bell's palsy.   Patient also given eye patch in addition to ophthalmic solution. Patient follow-up with neurology and ophthalmology if he has persistent bowing of scratching his eyes. Patient expresses understanding. [AN]      ED Course User Index  [AN] Santhosh Caruso MD          PROCEDURES:  None    CONSULTS:  None    CRITICAL CARE:      FINAL IMPRESSION      1.  Bell's palsy          DISPOSITION / PLAN     DISPOSITION Decision To Discharge 11/28/2021 02:22:31 PM      PATIENT REFERRED TO:  [facility] ED  [facility]  [phone]    If symptoms worsen    GILES [address] [place] [address]  [phone]      As needed    [facility] [address] [facility] [address]  [phone]    follow up for [facility]:  Discharge Medication List as of 11/28/2021  2:27 PM      START taking these medications    Details   predniSONE (DELTASONE) 20 MG tablet Take 3 tablets by mouth daily for 6 days, Disp-18 tablet, R-0Print      polyvinyl alcohol-povidone (ARTIFICIAL TEARS) 5-6 MG/ML SOLN opthalmic solution Place 1 drop into the right eye every 2 hours as needed for Dry Eyes, Disp-15 mL, R-0Print             Santhosh Caruso MD  Emergency Medicine Resident    (Please note that portions of this note were completed with a voice recognition program.Efforts were made to edit the dictations but occasionally words are mis-transcribed.)        Santhosh Caruso MD  Resident  11/29/21 2042

## 2021-11-28 NOTE — ED PROVIDER NOTES
[facility]     Emergency Department     Faculty Attestation    I performed a history and physical examination of the patient and discussed management with the resident. I reviewed the residents note and agree with the documented findings and plan of care. Any areas of disagreement are noted on the chart. I was personally present for the key portions of any procedures. I have documented in the chart those procedures where I was not present during the key portions. I have reviewed the emergency nurses triage note. I agree with the chief complaint, past medical history, past surgical history, allergies, medications, social and family history as documented unless otherwise noted below. For Physician Assistant/ Nurse Practitioner cases/documentation I have personally evaluated this patient and have completed at least one if not all key elements of the E/M (history, physical exam, and MDM). Additional findings are as noted. I have personally seen and evaluated the patient. I find the patient's history and physical exam are consistent with the NP/PA documentation. I agree with the care provided, treatment rendered, disposition and follow-up plan. 2 Days of right facial weakness on examination highly consistent with Bell's palsy as he does not spare forehead and eyelid is weak and otherwise has no neuro deficits of his upper lower extremities. Patient at low risk for Lyme disease    Critical Care     Carol Phoenix M.D.   Attending Emergency  Physician              Khai Pratt MD  11/28/21 0686

## 2021-11-28 NOTE — CONSULTS
[facility], [address], [address] [place] [address]  Ph: [phone] or [phone]  FAX: [phone]        Reason for consult: Right facial numbness    I had the pleasure of seeing your patient in neurology consultation for his symptoms. As you would recall Jabari Brunson is a 50 y.o. yo male admitted on 2021. The patient was at his baseline yesterday evening when I was trying to push his feet, he noticed having weakness involving the right face, difficulty closing the eye and tearing from the eye. He also noticed having right facial numbness. No aphasia. No weakness in the arms or legs. Past Medical History:   Diagnosis Date    Red's esophagus 15 years ago     gets scoped every 2-3 years    GERD (gastroesophageal reflux disease)     Gout     Hyperlipidemia     Hypertension     Morbid obesity ([address]     KVNG (obstructive sleep apnea)      Past Surgical History:   Procedure Laterality Date    ENDOSCOPY, COLON, DIAGNOSTIC      mulitple EGDs    HERNIA REPAIR  1982    hiatal hernia repair    TONSILLECTOMY      UPPER GASTROINTESTINAL ENDOSCOPY N/A 3/11/2019    EGD BIOPSY performed by Grace Abreu MD at [facility]     Social History     Socioeconomic History    Marital status: Single     Spouse name: Not on file    Number of children: Not on file    Years of education: Not on file    Highest education level: Not on file   Occupational History    Not on file   Tobacco Use    Smoking status: Former Smoker     Types: Cigars     Quit date: 2015     Years since quittin.9    Smokeless tobacco: Never Used    Tobacco comment: Used to smoke black and mild cigars occasionally.    Substance and Sexual Activity    Alcohol use: Not Currently     Comment: used to be heavy drinker 15 years ago - occasionally    Drug use: Not Currently     Comment: cocaine    Sexual activity: Not on file   Other Topics Concern    Not on file   Social History Narrative    Not on file     Social Determinants of Health     Financial Resource Strain:     Difficulty of Paying Living Expenses: Not on file   Food Insecurity:     Worried About Running Out of Food in the Last Year: Not on file    [place] of Food in the Last Year: Not on file   Transportation Needs:     Lack of Transportation (Medical): Not on file    Lack of Transportation (Non-Medical):  Not on file   Physical Activity:     Days of Exercise per Week: Not on file    Minutes of Exercise per Session: Not on file   Stress:     Feeling of Stress : Not on file   Social Connections:     Frequency of Communication with Friends and Family: Not on file    Frequency of Social Gatherings with Friends and Family: Not on file    Attends Congregation Services: Not on file    Active Member of [address] [place] [address] [employer] or Organizations: Not on file    Attends Club or Organization Meetings: Not on file    Marital Status: Not on file   Intimate Partner Violence:     Fear of Current or Ex-Partner: Not on file    Emotionally Abused: Not on file    Physically Abused: Not on file    Sexually Abused: Not on file   Housing Stability:     Unable to Pay for Housing in the Last Year: Not on file    Number of [place] in the Last Year: Not on file    Unstable Housing in the Last Year: Not on file     Family History   Problem Relation Age of Onset    Cancer Mother     Heart Disease Father     High Blood Pressure Father     Other Brother         parkinsons    Cancer Maternal Uncle     Diabetes Paternal Cousin       Allergies   Allergen Reactions    Lisinopril Anaphylaxis      BP (!) 158/102   Pulse 94   Temp 99.1 °F (37.3 °C) (Oral)   Resp 21   SpO2 97%      ROS:  Constitutional Negative for fever and chills   HEENT Negative for ear discharge, ear pain, nosebleed   Eyes Negative for photophobia, pain and discharge   Respiratory Negative for hemoptysis and sputum   Cardiovascular Negative for orthopnea, claudication and PND Gastrointestinal Negative for abdominal pain, diarrhea, blood in stool   Musculoskeletal Negative for joint pain, negative for myalgia   Skin Negative for rash or itching   Endo/heme/allergies Negative for polydipsia, environmental allergy   Psychiatric/behavioral Negative for suicidal ideation. Patient is not anxious   General examination:    Head: Normocephalic, atraumatic  Eyes: Extraocular movements intact  Lungs: Respirations unlabored, chest wall no deformity  ENT: Normal external ear canals, no sinus tenderness  Heart: Regular rate rhythm  Abdomen: No masses, tenderness  Extremities: No cyanosis or edema, 2+ pulses  Skin: Intact, normal skin color    Neurological examination:    Mental status   Alert and oriented; intact memory with no confusion, speech or language problems; no hallucinations or delusions     Cranial nerves   II - visual fields intact to confrontation                                                III, IV, VI - extra-ocular muscles full: no pupillary defect; no FUNMI, no nystagmus, no ptosis                                                                      V -50% decrease sensation on the face of the right side                                                          VII -right upper and lower facial weakness                                                      VIII - intact hearing                                                                             IX, X - symmetrical palate                                                                  XI - symmetrical shoulder shrug                                                       XII - midline tongue without atrophy or fasciculation     Motor function  Normal muscle bulk and tone; normal power 5/5, including fine motor movements     Sensory function Intact to touch, pin, vibration, proprioception     Cerebellar Intact fine motor movement. No involuntary movements or tremors     Reflex function Intact 2+ DTR and symmetric.  Negative Babinski     Gait                  Not tested         Lab Results   Component Value Date    LDLCHOLESTEROL      02/18/2021    LDLDIRECT 94 02/18/2021     No components found for: CHLPL  Lab Results   Component Value Date    TRIG 599 (H) 02/18/2021    TRIG 246 (H) 11/20/2019    TRIG 193 (H) 01/05/2018     Lab Results   Component Value Date    HDL 31 (L) 02/18/2021    HDL 25 (L) 11/20/2019    HDL 30 (L) 01/05/2018     No results found for: LDLCALC  No results found for: LABVLDL  Lab Results   Component Value Date    LABA1C 5.6 02/18/2021     Lab Results   Component Value Date     02/18/2021     No results found for: [id number]   Neurological work up:  CT head  CTA head and neck  MRI brain   2 D echo     Assessment and Recommendations:   Symptoms suggestive of right Bell's palsy. However it is somewhat unusual as patient has 50% decrease sensation on the right V1, V2 and V3 distribution. I would recommend to obtain CT scan of the head rule out structural brain damage. The patient is claustrophobic and does not want MRI scan of the brain. The patient understands that the cornea may be at risk because of poor eyelid closure and reduced tearing. I have recommended proper eye protection measures and use of guaze specially at night time during sleeping. I have given him the prescription for prednisone, starting 60 mg and then taper off by 20 mg every 3 days for total of nine days along with valacyclovir 1000 mg three times daily for one week    The patient understands that  the prognosis of Bell's palsy is related to the severity of the lesion. The prognosis is favorable if some recovery is seen within the first 21 days of onset.     If CT is negative, okay to discharge home        Zhou Maria MD  Neurology    This note is created with the assistance of a speech-recognition program. While intending to generate a document that actually reflects the content of the visit, the document can still have some errors including those of syntax and sound a- like substitutions which may escape proofreading. In such instances, actual meaning can be extrapolated by contextual derivation.

## 2021-11-29 LAB
EKG ATRIAL RATE: 88 BPM
EKG P AXIS: 57 DEGREES
EKG P-R INTERVAL: 196 MS
EKG Q-T INTERVAL: 380 MS
EKG QRS DURATION: 100 MS
EKG QTC CALCULATION (BAZETT): 459 MS
EKG R AXIS: -78 DEGREES
EKG T AXIS: 62 DEGREES
EKG VENTRICULAR RATE: 88 BPM

## 2021-11-29 PROCEDURE — 93010 ELECTROCARDIOGRAM REPORT: CPT | Performed by: INTERNAL MEDICINE

## 2021-11-29 ASSESSMENT — ENCOUNTER SYMPTOMS
BACK PAIN: 0
COUGH: 0
ABDOMINAL PAIN: 0
SHORTNESS OF BREATH: 0
DIARRHEA: 0
EYE PAIN: 1
EYE REDNESS: 1

## 2021-11-30 LAB — LYME ANTIBODY: 0.32

## 2022-04-14 DIAGNOSIS — I10 ESSENTIAL HYPERTENSION: ICD-10-CM

## 2022-04-14 RX ORDER — AMLODIPINE BESYLATE 10 MG/1
TABLET ORAL
Qty: 30 TABLET | Refills: 3 | Status: SHIPPED | OUTPATIENT
Start: 2022-04-14

## 2022-05-12 DIAGNOSIS — I10 ESSENTIAL HYPERTENSION: ICD-10-CM

## 2022-05-12 RX ORDER — ATORVASTATIN CALCIUM 40 MG/1
TABLET, FILM COATED ORAL
Qty: 30 TABLET | Refills: 0 | Status: SHIPPED | OUTPATIENT
Start: 2022-05-12

## 2022-07-05 RX ORDER — PREDNISONE 20 MG/1
TABLET ORAL
Qty: 18 TABLET | OUTPATIENT
Start: 2022-07-05

## 2022-07-05 RX ORDER — POLYVINYL ALCOHOL 14 MG/ML
SOLUTION/ DROPS OPHTHALMIC
Qty: 15 ML | OUTPATIENT
Start: 2022-07-05

## 2022-08-03 DIAGNOSIS — I10 ESSENTIAL HYPERTENSION: ICD-10-CM

## 2022-08-03 RX ORDER — ATORVASTATIN CALCIUM 40 MG/1
TABLET, FILM COATED ORAL
Qty: 30 TABLET | Refills: 0 | OUTPATIENT
Start: 2022-08-03

## 2022-08-10 ENCOUNTER — TELEPHONE (OUTPATIENT)
Dept: INTERNAL MEDICINE CLINIC | Age: 57
End: 2022-08-10

## 2022-09-03 DIAGNOSIS — I10 ESSENTIAL HYPERTENSION: ICD-10-CM

## 2022-09-06 RX ORDER — AMLODIPINE BESYLATE 10 MG/1
TABLET ORAL
Qty: 30 TABLET | Refills: 3 | OUTPATIENT
Start: 2022-09-06

## 2023-01-09 ENCOUNTER — TELEPHONE (OUTPATIENT)
Dept: INTERNAL MEDICINE CLINIC | Age: 58
End: 2023-01-09

## 2023-01-09 NOTE — TELEPHONE ENCOUNTER
----- Message from Meryle David sent at 1/9/2023  1:55 PM EST -----  Subject: Message to Provider    QUESTIONS  Information for Provider? Pt needs to reschedule his appt with Dr. Augustus More on   1/11. He prefers this Friday or Monday. Please call back, [employer] would   not allow me to reschedule the appt.  ---------------------------------------------------------------------------  --------------  Lorenzo Fierro INFO  [phone]; OK to leave message on voicemail  ---------------------------------------------------------------------------  --------------  SCRIPT ANSWERS  Relationship to Patient?  Self

## 2023-01-16 ENCOUNTER — TELEPHONE (OUTPATIENT)
Dept: INTERNAL MEDICINE CLINIC | Age: 58
End: 2023-01-16

## 2023-01-16 ENCOUNTER — OFFICE VISIT (OUTPATIENT)
Dept: INTERNAL MEDICINE CLINIC | Age: 58
End: 2023-01-16
Payer: COMMERCIAL

## 2023-01-16 VITALS
WEIGHT: 315 LBS | DIASTOLIC BLOOD PRESSURE: 88 MMHG | HEART RATE: 83 BPM | OXYGEN SATURATION: 96 % | SYSTOLIC BLOOD PRESSURE: 150 MMHG | BODY MASS INDEX: 51.54 KG/M2

## 2023-01-16 DIAGNOSIS — Z12.11 COLON CANCER SCREENING: ICD-10-CM

## 2023-01-16 DIAGNOSIS — I25.10 CORONARY ARTERY DISEASE INVOLVING NATIVE HEART WITHOUT ANGINA PECTORIS, UNSPECIFIED VESSEL OR LESION TYPE: ICD-10-CM

## 2023-01-16 DIAGNOSIS — K21.9 GASTROESOPHAGEAL REFLUX DISEASE, UNSPECIFIED WHETHER ESOPHAGITIS PRESENT: ICD-10-CM

## 2023-01-16 DIAGNOSIS — I10 ESSENTIAL HYPERTENSION: Primary | ICD-10-CM

## 2023-01-16 DIAGNOSIS — Z13.220 SCREENING FOR HYPERLIPIDEMIA: ICD-10-CM

## 2023-01-16 PROCEDURE — 99203 OFFICE O/P NEW LOW 30 MIN: CPT | Performed by: INTERNAL MEDICINE

## 2023-01-16 PROCEDURE — G8417 CALC BMI ABV UP PARAM F/U: HCPCS | Performed by: INTERNAL MEDICINE

## 2023-01-16 PROCEDURE — 1036F TOBACCO NON-USER: CPT | Performed by: INTERNAL MEDICINE

## 2023-01-16 PROCEDURE — 3079F DIAST BP 80-89 MM HG: CPT | Performed by: INTERNAL MEDICINE

## 2023-01-16 PROCEDURE — 3017F COLORECTAL CA SCREEN DOC REV: CPT | Performed by: INTERNAL MEDICINE

## 2023-01-16 PROCEDURE — 3077F SYST BP >= 140 MM HG: CPT | Performed by: INTERNAL MEDICINE

## 2023-01-16 PROCEDURE — G8484 FLU IMMUNIZE NO ADMIN: HCPCS | Performed by: INTERNAL MEDICINE

## 2023-01-16 PROCEDURE — G8427 DOCREV CUR MEDS BY ELIG CLIN: HCPCS | Performed by: INTERNAL MEDICINE

## 2023-01-16 RX ORDER — LANSOPRAZOLE 15 MG/1
CAPSULE, DELAYED RELEASE ORAL
Qty: 30 CAPSULE | Refills: 3 | Status: SHIPPED | OUTPATIENT
Start: 2023-01-16

## 2023-01-16 RX ORDER — ASPIRIN 325 MG
325 TABLET ORAL DAILY
Qty: 30 TABLET | Refills: 3 | Status: SHIPPED | OUTPATIENT
Start: 2023-01-16

## 2023-01-16 RX ORDER — ATORVASTATIN CALCIUM 40 MG/1
TABLET, FILM COATED ORAL
Qty: 30 TABLET | Refills: 3 | Status: SHIPPED | OUTPATIENT
Start: 2023-01-16

## 2023-01-16 RX ORDER — AMLODIPINE BESYLATE 10 MG/1
TABLET ORAL
Qty: 30 TABLET | Refills: 3 | Status: SHIPPED | OUTPATIENT
Start: 2023-01-16

## 2023-01-16 ASSESSMENT — PATIENT HEALTH QUESTIONNAIRE - PHQ9
1. LITTLE INTEREST OR PLEASURE IN DOING THINGS: 0
SUM OF ALL RESPONSES TO PHQ QUESTIONS 1-9: 0
2. FEELING DOWN, DEPRESSED OR HOPELESS: 0
SUM OF ALL RESPONSES TO PHQ QUESTIONS 1-9: 0
SUM OF ALL RESPONSES TO PHQ QUESTIONS 1-9: 0
SUM OF ALL RESPONSES TO PHQ9 QUESTIONS 1 & 2: 0
SUM OF ALL RESPONSES TO PHQ QUESTIONS 1-9: 0

## 2023-01-16 ASSESSMENT — ENCOUNTER SYMPTOMS: HEARTBURN: 1

## 2023-01-16 NOTE — PROGRESS NOTES
[address]  [place] [id number]  Dept: [phone]  Dept Fax: [phone]    Hoang Kaye is a 56 y.o. male who presents today for his medicalconditions/complaints as noted below. Hoang Comment is c/o of New Patient and Pre-op Exam      HPI:     Coronary Artery Disease  Presents for initial visit. The disease course has been stable. Past treatments include beta blockers, calcium channel blockers, statins, aspirin and nitrates. The treatment provided moderate relief. His past medical history is significant for past myocardial infarction. Hypertension  This is a chronic problem. The current episode started more than 1 year ago. The problem is unchanged. Past treatments include beta blockers and calcium channel blockers. The current treatment provides moderate improvement. Compliance problems include psychosocial issues (has been out of his medications). Hip Pain   The incident occurred more than 1 week ago. There was no injury mechanism. The pain is present in the left hip. The quality of the pain is described as aching. The pain is severe. The pain has been Constant since onset. Treatments tried: scheduled SAM. Gastroesophageal Reflux  He complains of heartburn. This is a chronic problem. The current episode started more than 1 year ago. Nothing aggravates the symptoms. He has tried a PPI for the symptoms. The treatment provided moderate relief. Past procedures include an EGD.      Past Medical History:   Diagnosis Date    Red's esophagus 15 years ago     gets scoped every 2-3 years    GERD (gastroesophageal reflux disease)     Gout     Hyperlipidemia     Hypertension     Morbid obesity (HCC)     KVNG (obstructive sleep apnea)         Current Outpatient Medications   Medication Sig Dispense Refill    amLODIPine (NORVASC) 10 MG tablet take 1 tablet by mouth once daily 30 tablet 3    aspirin 325 MG tablet Take 1 tablet by mouth daily 30 tablet 3 atorvastatin (LIPITOR) 40 MG tablet take 1 tablet by mouth once daily 30 tablet 3    lansoprazole (PREVACID) 15 MG delayed release capsule take 2 capsules by mouth once daily 30 capsule 3    metoprolol tartrate (LOPRESSOR) 25 MG tablet take 1 tablet by mouth twice a day 60 tablet 3    polyvinyl alcohol-povidone (ARTIFICIAL TEARS) 5-6 MG/ML SOLN opthalmic solution Place 1 drop into the right eye every 2 hours as needed for Dry Eyes 15 mL 0    acetaminophen (TYLENOL) 500 MG tablet Take 2 tablets by mouth 3 times daily 540 tablet 1    Blood Pressure KIT 1 kit by Does not apply route daily 1 kit 0    nitroGLYCERIN (NITROSTAT) 0.4 MG SL tablet up to max of 3 total doses. If no relief after 1 dose, call 911. 25 tablet 3     No current facility-administered medications for this visit. Allergies   Allergen Reactions    Lisinopril Anaphylaxis       Health Maintenance   Topic Date Due    COVID-19 Vaccine (1) Never done    HIV screen  Never done    Hepatitis C screen  Never done    DTaP/Tdap/Td vaccine (1 - Tdap) Never done    Colorectal Cancer Screen  Never done    Shingles vaccine (1 of 2) Never done    Lipids  02/18/2022    Depression Screen  05/04/2022    Flu vaccine (1) Never done    Hepatitis A vaccine  Aged Out    Hib vaccine  Aged Out    Meningococcal (ACWY) vaccine  Aged Out    Pneumococcal 0-64 years Vaccine  Aged Out       Subjective:      Review of Systems   Gastrointestinal:  Positive for heartburn. All other systems reviewed and are negative. Objective:     Physical Exam  Vitals reviewed. Constitutional:       Appearance: He is well-developed. HENT:      Head: Normocephalic and atraumatic. Eyes:      Conjunctiva/sclera: Conjunctivae normal.      Pupils: Pupils are equal, round, and reactive to light. Neck:      Thyroid: No thyromegaly. Vascular: No JVD. Cardiovascular:      Rate and Rhythm: Normal rate and regular rhythm. Pulses: Normal pulses. Heart sounds: Normal heart sounds. No murmur heard. Pulmonary:      Effort: Pulmonary effort is normal.      Breath sounds: Normal breath sounds. Abdominal:      General: Bowel sounds are normal.      Palpations: Abdomen is soft. Musculoskeletal:         General: Normal range of motion. Cervical back: Normal range of motion and neck supple. Right lower leg: No edema. Left lower leg: No edema. Skin:     General: Skin is warm and dry. Neurological:      Mental Status: He is alert and oriented to person, place, and time. Deep Tendon Reflexes: Reflexes are normal and symmetric. BP (!) 150/90 Comment: have not been on bp meds  Pulse 83   Wt (!) 349 lb (158.3 kg)   SpO2 96%   BMI 51.54 kg/m²       Assessment:       Diagnosis Orders   1. Essential hypertension  amLODIPine (NORVASC) 10 MG tablet    aspirin 325 MG tablet    atorvastatin (LIPITOR) 40 MG tablet    metoprolol tartrate (LOPRESSOR) 25 MG tablet      2. Screening for hyperlipidemia  Lipid Panel      3. Gastroesophageal reflux disease, unspecified whether esophagitis present  lansoprazole (PREVACID) 15 MG delayed release capsule      4. Coronary artery disease involving native heart without angina pectoris, unspecified vessel or lesion type        5. Colon cancer screening  FIT-DNA (Cologuard)          Plan:      No follow-ups on file.     Orders Placed This Encounter   Medications    amLODIPine (NORVASC) 10 MG tablet     Sig: take 1 tablet by mouth once daily     Dispense:  30 tablet     Refill:  3    aspirin 325 MG tablet     Sig: Take 1 tablet by mouth daily     Dispense:  30 tablet     Refill:  3    atorvastatin (LIPITOR) 40 MG tablet     Sig: take 1 tablet by mouth once daily     Dispense:  30 tablet     Refill:  3    lansoprazole (PREVACID) 15 MG delayed release capsule     Sig: take 2 capsules by mouth once daily     Dispense:  30 capsule     Refill:  3    metoprolol tartrate (LOPRESSOR) 25 MG tablet     Sig: take 1 tablet by mouth twice a day     Dispense: 60 tablet     Refill:  3     Orders Placed This Encounter   Procedures    FIT-DNA (Cologuard)    Lipid Panel     Standing Status:   Future     Standing Expiration Date:   1/16/2024     Order Specific Question:   Is Patient Fasting?/# of Hours     Answer:   No            Patient given educational materials - see patient instructions. Discussed use, benefit, and side effects of prescribed medications. All patientquestions answered. Pt voiced understanding.     Electronically signed by Willow Baez MD on 1/16/2023at 10:48 AM

## 2023-01-16 NOTE — TELEPHONE ENCOUNTER
Medical surgical clearance request received 01/16/23    Surgeon: Darby Casey    Procedure: Left total hip    Date of Procedure: unknown    Last appt: 1/16/2023    Next appt: 1/24/2023    PATs received:  No

## 2023-01-18 ENCOUNTER — HOSPITAL ENCOUNTER (OUTPATIENT)
Age: 58
Setting detail: SPECIMEN
Discharge: HOME | End: 2023-01-18

## 2023-01-18 DIAGNOSIS — Z13.220 SCREENING FOR HYPERLIPIDEMIA: ICD-10-CM

## 2023-01-18 LAB
CHOLESTEROL/HDL RATIO: 5.9
CHOLESTEROL: 230 MG/DL
HDLC SERPL-MCNC: 39 MG/DL
LDL CHOLESTEROL: 154 MG/DL (ref 0–130)
TRIGL SERPL-MCNC: 184 MG/DL

## 2023-04-05 ENCOUNTER — HOSPITAL ENCOUNTER (OUTPATIENT)
Age: 58
Setting detail: SPECIMEN
Discharge: HOME | End: 2023-04-05

## 2023-04-05 LAB
ALBUMIN SERPL-MCNC: 3.7 G/DL (ref 3.5–5.2)
ALP SERPL-CCNC: 96 U/L (ref 40–129)
ALT SERPL-CCNC: 46 U/L (ref 5–41)
ANION GAP SERPL CALCULATED.3IONS-SCNC: 8 MMOL/L (ref 9–17)
AST SERPL-CCNC: 27 U/L
BILIRUB SERPL-MCNC: 0.4 MG/DL (ref 0.3–1.2)
BUN SERPL-MCNC: 14 MG/DL (ref 6–20)
BUN/CREAT BLD: 19 (ref 9–20)
CALCIUM SERPL-MCNC: 9.4 MG/DL (ref 8.6–10.4)
CHLORIDE SERPL-SCNC: 99 MMOL/L (ref 98–107)
CO2 SERPL-SCNC: 32 MMOL/L (ref 20–31)
CREAT SERPL-MCNC: 0.74 MG/DL (ref 0.7–1.2)
GFR SERPL CREATININE-BSD FRML MDRD: >60 ML/MIN/1.73M2
GLUCOSE SERPL-MCNC: 172 MG/DL (ref 70–99)
HCT VFR BLD AUTO: 36.7 % (ref 40.7–50.3)
HGB BLD-MCNC: 11 G/DL (ref 13–17)
MCH RBC QN AUTO: 27 PG (ref 25.2–33.5)
MCHC RBC AUTO-ENTMCNC: 30 G/DL (ref 28.4–34.8)
MCV RBC AUTO: 90.2 FL (ref 82.6–102.9)
NRBC AUTOMATED: 0 PER 100 WBC
PDW BLD-RTO: 13.5 % (ref 11.8–14.4)
PLATELET # BLD AUTO: 314 K/UL (ref 138–453)
PMV BLD AUTO: 10.5 FL (ref 8.1–13.5)
POTASSIUM SERPL-SCNC: 4.6 MMOL/L (ref 3.7–5.3)
PROT SERPL-MCNC: 6.7 G/DL (ref 6.4–8.3)
RBC # BLD: 4.07 M/UL (ref 4.21–5.77)
SODIUM SERPL-SCNC: 139 MMOL/L (ref 135–144)
WBC # BLD AUTO: 11.2 K/UL (ref 3.5–11.3)

## 2023-04-05 PROCEDURE — 85027 COMPLETE CBC AUTOMATED: CPT

## 2023-04-05 PROCEDURE — 80053 COMPREHEN METABOLIC PANEL: CPT

## 2023-04-05 PROCEDURE — P9603 ONE-WAY ALLOW PRORATED MILES: HCPCS

## 2023-04-05 PROCEDURE — 36415 COLL VENOUS BLD VENIPUNCTURE: CPT

## 2023-04-07 ENCOUNTER — HOSPITAL ENCOUNTER (OUTPATIENT)
Age: 58
Setting detail: SPECIMEN
Discharge: HOME | End: 2023-04-07

## 2023-04-07 LAB
ANION GAP SERPL CALCULATED.3IONS-SCNC: 13 MMOL/L (ref 9–17)
BUN SERPL-MCNC: 13 MG/DL (ref 6–20)
BUN/CREAT BLD: 16 (ref 9–20)
CALCIUM SERPL-MCNC: 9.5 MG/DL (ref 8.6–10.4)
CHLORIDE SERPL-SCNC: 100 MMOL/L (ref 98–107)
CO2 SERPL-SCNC: 25 MMOL/L (ref 20–31)
CREAT SERPL-MCNC: 0.79 MG/DL (ref 0.7–1.2)
GFR SERPL CREATININE-BSD FRML MDRD: >60 ML/MIN/1.73M2
GLUCOSE SERPL-MCNC: 129 MG/DL (ref 70–99)
MAGNESIUM SERPL-MCNC: 2.2 MG/DL (ref 1.6–2.6)
PHOSPHATE SERPL-MCNC: 4.6 MG/DL (ref 2.5–4.5)
POTASSIUM SERPL-SCNC: 4.2 MMOL/L (ref 3.7–5.3)
SODIUM SERPL-SCNC: 138 MMOL/L (ref 135–144)

## 2023-04-07 PROCEDURE — 36415 COLL VENOUS BLD VENIPUNCTURE: CPT

## 2023-04-07 PROCEDURE — 80048 BASIC METABOLIC PNL TOTAL CA: CPT

## 2023-04-07 PROCEDURE — P9603 ONE-WAY ALLOW PRORATED MILES: HCPCS

## 2023-04-07 PROCEDURE — 83735 ASSAY OF MAGNESIUM: CPT

## 2023-04-07 PROCEDURE — 84100 ASSAY OF PHOSPHORUS: CPT

## 2023-07-06 ENCOUNTER — TELEPHONE (OUTPATIENT)
Dept: INTERNAL MEDICINE CLINIC | Age: 58
End: 2023-07-06

## 2023-07-16 DIAGNOSIS — I10 ESSENTIAL HYPERTENSION: ICD-10-CM

## 2023-07-17 RX ORDER — ATORVASTATIN CALCIUM 40 MG/1
TABLET, FILM COATED ORAL
Qty: 30 TABLET | Refills: 3 | Status: SHIPPED | OUTPATIENT
Start: 2023-07-17

## 2023-07-18 ENCOUNTER — HOSPITAL ENCOUNTER (OUTPATIENT)
Dept: PREADMISSION TESTING | Age: 58
Discharge: HOME | End: 2023-07-22
Payer: COMMERCIAL

## 2023-07-18 VITALS
SYSTOLIC BLOOD PRESSURE: 149 MMHG | HEART RATE: 98 BPM | TEMPERATURE: 97.9 F | WEIGHT: 315 LBS | DIASTOLIC BLOOD PRESSURE: 83 MMHG | HEIGHT: 69 IN | OXYGEN SATURATION: 98 % | BODY MASS INDEX: 46.65 KG/M2 | RESPIRATION RATE: 18 BRPM

## 2023-07-18 LAB
ANION GAP SERPL CALCULATED.3IONS-SCNC: 14 MMOL/L (ref 9–17)
BASOPHILS # BLD: 0.1 K/UL (ref 0–0.2)
BASOPHILS NFR BLD: 1 % (ref 0–2)
BUN SERPL-MCNC: 17 MG/DL (ref 6–20)
CALCIUM SERPL-MCNC: 9.7 MG/DL (ref 8.6–10.4)
CHLORIDE SERPL-SCNC: 101 MMOL/L (ref 98–107)
CO2 SERPL-SCNC: 26 MMOL/L (ref 20–31)
CREAT SERPL-MCNC: 0.8 MG/DL (ref 0.7–1.2)
EOSINOPHIL # BLD: 0.2 K/UL (ref 0–0.4)
EOSINOPHILS RELATIVE PERCENT: 1 % (ref 0–4)
ERYTHROCYTE [DISTWIDTH] IN BLOOD BY AUTOMATED COUNT: 15 % (ref 11.5–14.9)
GFR SERPL CREATININE-BSD FRML MDRD: >60 ML/MIN/1.73M2
GLUCOSE SERPL-MCNC: 150 MG/DL (ref 70–99)
HCT VFR BLD AUTO: 40.3 % (ref 41–53)
HGB BLD-MCNC: 13.4 G/DL (ref 13.5–17.5)
LYMPHOCYTES # BLD: 11 % (ref 24–44)
LYMPHOCYTES NFR BLD: 1.6 K/UL (ref 1–4.8)
MCH RBC QN AUTO: 26.5 PG (ref 26–34)
MCHC RBC AUTO-ENTMCNC: 33.4 G/DL (ref 31–37)
MCV RBC AUTO: 79.5 FL (ref 80–100)
MONOCYTES NFR BLD: 0.8 K/UL (ref 0.1–1.3)
MONOCYTES NFR BLD: 6 % (ref 1–7)
NEUTROPHILS NFR BLD: 81 % (ref 36–66)
NEUTS SEG NFR BLD: 11.7 K/UL (ref 1.3–9.1)
PLATELET # BLD AUTO: 314 K/UL (ref 150–450)
PMV BLD AUTO: 8.4 FL (ref 6–12)
POTASSIUM SERPL-SCNC: 4.3 MMOL/L (ref 3.7–5.3)
RBC # BLD AUTO: 5.07 M/UL (ref 4.5–5.9)
SODIUM SERPL-SCNC: 141 MMOL/L (ref 135–144)
WBC OTHER # BLD: 14.4 K/UL (ref 3.5–11)

## 2023-07-18 PROCEDURE — 36415 COLL VENOUS BLD VENIPUNCTURE: CPT

## 2023-07-18 PROCEDURE — 85027 COMPLETE CBC AUTOMATED: CPT

## 2023-07-18 PROCEDURE — 80048 BASIC METABOLIC PNL TOTAL CA: CPT

## 2023-07-18 RX ORDER — GABAPENTIN 300 MG/1
300 CAPSULE ORAL 3 TIMES DAILY
COMMUNITY

## 2023-07-18 ASSESSMENT — PAIN DESCRIPTION - FREQUENCY: FREQUENCY: CONTINUOUS

## 2023-07-18 ASSESSMENT — PAIN - FUNCTIONAL ASSESSMENT: PAIN_FUNCTIONAL_ASSESSMENT: PREVENTS OR INTERFERES SOME ACTIVE ACTIVITIES AND ADLS

## 2023-07-18 ASSESSMENT — PAIN SCALES - GENERAL: PAINLEVEL_OUTOF10: 7

## 2023-07-18 ASSESSMENT — PAIN DESCRIPTION - DESCRIPTORS: DESCRIPTORS: ACHING

## 2023-07-18 ASSESSMENT — PAIN DESCRIPTION - ORIENTATION: ORIENTATION: LOWER

## 2023-07-18 ASSESSMENT — PAIN DESCRIPTION - LOCATION: LOCATION: BACK

## 2023-07-18 ASSESSMENT — PAIN DESCRIPTION - PAIN TYPE: TYPE: CHRONIC PAIN

## 2023-07-18 NOTE — DISCHARGE INSTRUCTIONS
Pre-op Instructions For Out-Patient Surgery    Medication Instructions:  Please stop herbs and any supplements now (includes vitamins and minerals). Please contact your surgeon and prescribing physician for pre-op instructions for any blood thinners. If you have inhalers/aerosol treatments at home, please use them the morning of your surgery and bring the inhalers with you to the hospital.    Please take the following medications the morning of your surgery with a sip of water:    Metoprolol, Amlodipine, Lansoprazole    Surgery Instructions:  After midnight before surgery:  Do not eat or drink anything, including water, mints, gum, and hard candy. You may brush your teeth without swallowing. No smoking, chewing tobacco, or street drugs. Please shower or bathe before surgery. If you were given Surgical Scrub Chlorhexidine Gluconate Liquid (CHG), please shower the night before and the morning of your surgery following the detailed instructions you received during your pre-admission visit. Please do not wear any cologne, lotion, powder, deodorant, jewelry, piercings, perfume, makeup, nail polish, hair accessories, or hair spray on the day of surgery. Wear loose comfortable clothing. Leave your valuables at home. Bring a storage case for any glasses/contacts. An adult who is responsible for you MUST drive you home and should be with you for the first 24 hours after surgery. If having out-patient knee and foot surgeries, please arrange for planned crutches, walker, or wheelchair before arriving to the hospital.    The Day of Surgery:  Arrive at [facility] at the time directed by your surgeon and check in at the desk. If you have a living will or healthcare power of , please bring a copy. You will be taken to the pre-op holding area where you will be prepared for surgery.   A physical assessment will be performed by a

## 2023-07-21 ENCOUNTER — ANESTHESIA EVENT (OUTPATIENT)
Dept: OPERATING ROOM | Age: 58
End: 2023-07-21
Payer: COMMERCIAL

## 2023-07-21 NOTE — PRE-PROCEDURE INSTRUCTIONS
No answer, left message? N/A                            Unable to leave message ? N/A    When were you told to arrive at hospital ? 1200     Do you have a  ? Yes    Are you on any blood thinners ? No                  If yes when did you stop taking ? Do you have your prep Rx filled and instruction ? N/A    Nothing to eat the day before , only clear liquids. N/A    Are you experiencing any covid symptoms ? No    Do you have any infections or rash we should be aware of ? No      Do you have the Hibiclens soap to use the night before and the morning of surgery ? Yes    Nothing to eat or drink after midnight, only a sip of water to take any medication instructed to take the night before. Yes  Wear comfortable clothing, leave any valuables at home, remove any jewelry and body piercing .  Yes

## 2023-07-24 ENCOUNTER — HOSPITAL ENCOUNTER (OUTPATIENT)
Age: 58
Setting detail: OUTPATIENT SURGERY
Discharge: HOME | End: 2023-07-24
Attending: SURGERY | Admitting: SURGERY
Payer: COMMERCIAL

## 2023-07-24 ENCOUNTER — ANESTHESIA (OUTPATIENT)
Dept: OPERATING ROOM | Age: 58
End: 2023-07-24
Payer: COMMERCIAL

## 2023-07-24 VITALS
OXYGEN SATURATION: 95 % | RESPIRATION RATE: 14 BRPM | BODY MASS INDEX: 46.65 KG/M2 | HEART RATE: 83 BPM | DIASTOLIC BLOOD PRESSURE: 82 MMHG | HEIGHT: 69 IN | TEMPERATURE: 98.2 F | SYSTOLIC BLOOD PRESSURE: 162 MMHG | WEIGHT: 315 LBS

## 2023-07-24 DIAGNOSIS — K43.9 VENTRAL HERNIA WITHOUT OBSTRUCTION OR GANGRENE: Primary | ICD-10-CM

## 2023-07-24 PROCEDURE — C9399 UNCLASSIFIED DRUGS OR BIOLOG: HCPCS | Performed by: NURSE ANESTHETIST, CERTIFIED REGISTERED

## 2023-07-24 PROCEDURE — 6360000002 HC RX W HCPCS: Performed by: NURSE ANESTHETIST, CERTIFIED REGISTERED

## 2023-07-24 PROCEDURE — 7100000001 HC PACU RECOVERY - ADDTL 15 MIN: Performed by: SURGERY

## 2023-07-24 PROCEDURE — 7100000011 HC PHASE II RECOVERY - ADDTL 15 MIN: Performed by: SURGERY

## 2023-07-24 PROCEDURE — 2580000003 HC RX 258: Performed by: NURSE ANESTHETIST, CERTIFIED REGISTERED

## 2023-07-24 PROCEDURE — 6360000002 HC RX W HCPCS: Performed by: SURGERY

## 2023-07-24 PROCEDURE — 2720000010 HC SURG SUPPLY STERILE: Performed by: SURGERY

## 2023-07-24 PROCEDURE — 6370000000 HC RX 637 (ALT 250 FOR IP): Performed by: ANESTHESIOLOGY

## 2023-07-24 PROCEDURE — 7100000000 HC PACU RECOVERY - FIRST 15 MIN: Performed by: SURGERY

## 2023-07-24 PROCEDURE — 2500000003 HC RX 250 WO HCPCS: Performed by: NURSE ANESTHETIST, CERTIFIED REGISTERED

## 2023-07-24 PROCEDURE — 2580000003 HC RX 258: Performed by: ANESTHESIOLOGY

## 2023-07-24 PROCEDURE — 3600000012 HC SURGERY LEVEL 2 ADDTL 15MIN: Performed by: SURGERY

## 2023-07-24 PROCEDURE — 7100000010 HC PHASE II RECOVERY - FIRST 15 MIN: Performed by: SURGERY

## 2023-07-24 PROCEDURE — 7100000031 HC ASPR PHASE II RECOVERY - ADDTL 15 MIN: Performed by: SURGERY

## 2023-07-24 PROCEDURE — 3700000001 HC ADD 15 MINUTES (ANESTHESIA): Performed by: SURGERY

## 2023-07-24 PROCEDURE — 2709999900 HC NON-CHARGEABLE SUPPLY: Performed by: SURGERY

## 2023-07-24 PROCEDURE — 3600000002 HC SURGERY LEVEL 2 BASE: Performed by: SURGERY

## 2023-07-24 PROCEDURE — 7100000030 HC ASPR PHASE II RECOVERY - FIRST 15 MIN: Performed by: SURGERY

## 2023-07-24 PROCEDURE — C1781 MESH (IMPLANTABLE): HCPCS | Performed by: SURGERY

## 2023-07-24 PROCEDURE — 6360000002 HC RX W HCPCS: Performed by: ANESTHESIOLOGY

## 2023-07-24 PROCEDURE — 3700000000 HC ANESTHESIA ATTENDED CARE: Performed by: SURGERY

## 2023-07-24 DEVICE — PATCH HERN L DIA4.5IN UNCOATED MFIL PROPYLENE CIR ABSRB: Type: IMPLANTABLE DEVICE | Site: ABDOMEN | Status: FUNCTIONAL

## 2023-07-24 RX ORDER — SODIUM CHLORIDE 9 MG/ML
INJECTION, SOLUTION INTRAVENOUS PRN
Status: DISCONTINUED | OUTPATIENT
Start: 2023-07-24 | End: 2023-07-24 | Stop reason: HOSPADM

## 2023-07-24 RX ORDER — SODIUM CHLORIDE 0.9 % (FLUSH) 0.9 %
5-40 SYRINGE (ML) INJECTION PRN
Status: DISCONTINUED | OUTPATIENT
Start: 2023-07-24 | End: 2023-07-24 | Stop reason: HOSPADM

## 2023-07-24 RX ORDER — ONDANSETRON 2 MG/ML
4 INJECTION INTRAMUSCULAR; INTRAVENOUS
Status: DISCONTINUED | OUTPATIENT
Start: 2023-07-24 | End: 2023-07-24 | Stop reason: HOSPADM

## 2023-07-24 RX ORDER — ROCURONIUM BROMIDE 10 MG/ML
INJECTION, SOLUTION INTRAVENOUS PRN
Status: DISCONTINUED | OUTPATIENT
Start: 2023-07-24 | End: 2023-07-24 | Stop reason: SDUPTHER

## 2023-07-24 RX ORDER — SULFAMETHOXAZOLE AND TRIMETHOPRIM 800; 160 MG/1; MG/1
1 TABLET ORAL 2 TIMES DAILY
Qty: 28 TABLET | Refills: 0 | Status: SHIPPED | OUTPATIENT
Start: 2023-07-24 | End: 2023-08-07

## 2023-07-24 RX ORDER — ONDANSETRON 4 MG/1
TABLET, FILM COATED ORAL
Qty: 20 TABLET | Refills: 0 | Status: SHIPPED | OUTPATIENT
Start: 2023-07-24

## 2023-07-24 RX ORDER — SODIUM CHLORIDE, SODIUM LACTATE, POTASSIUM CHLORIDE, CALCIUM CHLORIDE 600; 310; 30; 20 MG/100ML; MG/100ML; MG/100ML; MG/100ML
INJECTION, SOLUTION INTRAVENOUS CONTINUOUS
Status: DISCONTINUED | OUTPATIENT
Start: 2023-07-24 | End: 2023-07-24 | Stop reason: HOSPADM

## 2023-07-24 RX ORDER — LIDOCAINE HYDROCHLORIDE 10 MG/ML
INJECTION, SOLUTION EPIDURAL; INFILTRATION; INTRACAUDAL; PERINEURAL PRN
Status: DISCONTINUED | OUTPATIENT
Start: 2023-07-24 | End: 2023-07-24

## 2023-07-24 RX ORDER — LABETALOL HYDROCHLORIDE 5 MG/ML
10 INJECTION, SOLUTION INTRAVENOUS
Status: DISCONTINUED | OUTPATIENT
Start: 2023-07-24 | End: 2023-07-24 | Stop reason: HOSPADM

## 2023-07-24 RX ORDER — METOCLOPRAMIDE HYDROCHLORIDE 5 MG/ML
10 INJECTION INTRAMUSCULAR; INTRAVENOUS
Status: DISCONTINUED | OUTPATIENT
Start: 2023-07-24 | End: 2023-07-24 | Stop reason: HOSPADM

## 2023-07-24 RX ORDER — ONDANSETRON 2 MG/ML
INJECTION INTRAMUSCULAR; INTRAVENOUS PRN
Status: DISCONTINUED | OUTPATIENT
Start: 2023-07-24 | End: 2023-07-24 | Stop reason: SDUPTHER

## 2023-07-24 RX ORDER — ONDANSETRON 2 MG/ML
INJECTION INTRAMUSCULAR; INTRAVENOUS PRN
Status: DISCONTINUED | OUTPATIENT
Start: 2023-07-24 | End: 2023-07-24

## 2023-07-24 RX ORDER — DIPHENHYDRAMINE HYDROCHLORIDE 50 MG/ML
12.5 INJECTION INTRAMUSCULAR; INTRAVENOUS
Status: COMPLETED | OUTPATIENT
Start: 2023-07-24 | End: 2023-07-24

## 2023-07-24 RX ORDER — SODIUM CHLORIDE 0.9 % (FLUSH) 0.9 %
5-40 SYRINGE (ML) INJECTION EVERY 12 HOURS SCHEDULED
Status: DISCONTINUED | OUTPATIENT
Start: 2023-07-24 | End: 2023-07-24 | Stop reason: HOSPADM

## 2023-07-24 RX ORDER — OXYCODONE HYDROCHLORIDE AND ACETAMINOPHEN 5; 325 MG/1; MG/1
1 TABLET ORAL EVERY 6 HOURS PRN
Qty: 28 TABLET | Refills: 0 | Status: SHIPPED | OUTPATIENT
Start: 2023-07-24 | End: 2023-07-31

## 2023-07-24 RX ORDER — DEXAMETHASONE SODIUM PHOSPHATE 4 MG/ML
INJECTION, SOLUTION INTRA-ARTICULAR; INTRALESIONAL; INTRAMUSCULAR; INTRAVENOUS; SOFT TISSUE PRN
Status: DISCONTINUED | OUTPATIENT
Start: 2023-07-24 | End: 2023-07-24 | Stop reason: SDUPTHER

## 2023-07-24 RX ORDER — PROPOFOL 10 MG/ML
INJECTION, EMULSION INTRAVENOUS PRN
Status: DISCONTINUED | OUTPATIENT
Start: 2023-07-24 | End: 2023-07-24 | Stop reason: SDUPTHER

## 2023-07-24 RX ORDER — GABAPENTIN 300 MG/1
300 CAPSULE ORAL ONCE
Status: COMPLETED | OUTPATIENT
Start: 2023-07-24 | End: 2023-07-24

## 2023-07-24 RX ORDER — FENTANYL CITRATE 50 UG/ML
INJECTION, SOLUTION INTRAMUSCULAR; INTRAVENOUS PRN
Status: DISCONTINUED | OUTPATIENT
Start: 2023-07-24 | End: 2023-07-24 | Stop reason: SDUPTHER

## 2023-07-24 RX ORDER — LIDOCAINE HYDROCHLORIDE 10 MG/ML
1 INJECTION, SOLUTION EPIDURAL; INFILTRATION; INTRACAUDAL; PERINEURAL
Status: DISCONTINUED | OUTPATIENT
Start: 2023-07-24 | End: 2023-07-24 | Stop reason: HOSPADM

## 2023-07-24 RX ORDER — FENTANYL CITRATE 0.05 MG/ML
25 INJECTION, SOLUTION INTRAMUSCULAR; INTRAVENOUS EVERY 5 MIN PRN
Status: DISCONTINUED | OUTPATIENT
Start: 2023-07-24 | End: 2023-07-24 | Stop reason: HOSPADM

## 2023-07-24 RX ORDER — ACETAMINOPHEN 500 MG
1000 TABLET ORAL ONCE
Status: COMPLETED | OUTPATIENT
Start: 2023-07-24 | End: 2023-07-24

## 2023-07-24 RX ORDER — SODIUM CHLORIDE, SODIUM LACTATE, POTASSIUM CHLORIDE, CALCIUM CHLORIDE 600; 310; 30; 20 MG/100ML; MG/100ML; MG/100ML; MG/100ML
INJECTION, SOLUTION INTRAVENOUS CONTINUOUS PRN
Status: DISCONTINUED | OUTPATIENT
Start: 2023-07-24 | End: 2023-07-24 | Stop reason: SDUPTHER

## 2023-07-24 RX ORDER — MIDAZOLAM HYDROCHLORIDE 1 MG/ML
INJECTION INTRAMUSCULAR; INTRAVENOUS PRN
Status: DISCONTINUED | OUTPATIENT
Start: 2023-07-24 | End: 2023-07-24 | Stop reason: SDUPTHER

## 2023-07-24 RX ORDER — BUPIVACAINE HYDROCHLORIDE 5 MG/ML
INJECTION, SOLUTION EPIDURAL; INTRACAUDAL PRN
Status: DISCONTINUED | OUTPATIENT
Start: 2023-07-24 | End: 2023-07-24 | Stop reason: ALTCHOICE

## 2023-07-24 RX ORDER — CEPHALEXIN 500 MG/1
CAPSULE ORAL
Qty: 21 CAPSULE | Refills: 0 | Status: SHIPPED | OUTPATIENT
Start: 2023-07-24

## 2023-07-24 RX ORDER — HYDROCODONE BITARTRATE AND ACETAMINOPHEN 5; 325 MG/1; MG/1
1 TABLET ORAL EVERY 6 HOURS PRN
Status: COMPLETED | OUTPATIENT
Start: 2023-07-24 | End: 2023-07-24

## 2023-07-24 RX ORDER — HYDRALAZINE HYDROCHLORIDE 20 MG/ML
10 INJECTION INTRAMUSCULAR; INTRAVENOUS
Status: DISCONTINUED | OUTPATIENT
Start: 2023-07-24 | End: 2023-07-24 | Stop reason: HOSPADM

## 2023-07-24 RX ORDER — LIDOCAINE HYDROCHLORIDE 10 MG/ML
INJECTION, SOLUTION EPIDURAL; INFILTRATION; INTRACAUDAL; PERINEURAL PRN
Status: DISCONTINUED | OUTPATIENT
Start: 2023-07-24 | End: 2023-07-24 | Stop reason: SDUPTHER

## 2023-07-24 RX ADMIN — HYDROMORPHONE HYDROCHLORIDE 0.5 MG: 1 INJECTION, SOLUTION INTRAMUSCULAR; INTRAVENOUS; SUBCUTANEOUS at 15:45

## 2023-07-24 RX ADMIN — SUGAMMADEX 200 MG: 100 INJECTION, SOLUTION INTRAVENOUS at 15:22

## 2023-07-24 RX ADMIN — ROCURONIUM BROMIDE 10 MG: 10 INJECTION, SOLUTION INTRAVENOUS at 14:40

## 2023-07-24 RX ADMIN — Medication 3000 MG: at 13:42

## 2023-07-24 RX ADMIN — ROCURONIUM BROMIDE 50 MG: 10 INJECTION, SOLUTION INTRAVENOUS at 13:50

## 2023-07-24 RX ADMIN — SODIUM CHLORIDE, POTASSIUM CHLORIDE, SODIUM LACTATE AND CALCIUM CHLORIDE: 600; 310; 30; 20 INJECTION, SOLUTION INTRAVENOUS at 15:09

## 2023-07-24 RX ADMIN — SODIUM CHLORIDE, POTASSIUM CHLORIDE, SODIUM LACTATE AND CALCIUM CHLORIDE: 600; 310; 30; 20 INJECTION, SOLUTION INTRAVENOUS at 13:39

## 2023-07-24 RX ADMIN — ONDANSETRON 4 MG: 2 INJECTION INTRAMUSCULAR; INTRAVENOUS at 13:52

## 2023-07-24 RX ADMIN — PROPOFOL 200 MG: 10 INJECTION, EMULSION INTRAVENOUS at 13:50

## 2023-07-24 RX ADMIN — FENTANYL CITRATE 50 MCG: 50 INJECTION, SOLUTION INTRAMUSCULAR; INTRAVENOUS at 14:15

## 2023-07-24 RX ADMIN — FENTANYL CITRATE 25 MCG: 0.05 INJECTION, SOLUTION INTRAMUSCULAR; INTRAVENOUS at 15:58

## 2023-07-24 RX ADMIN — HYDROCODONE BITARTRATE AND ACETAMINOPHEN 1 TABLET: 5; 325 TABLET ORAL at 16:25

## 2023-07-24 RX ADMIN — FENTANYL CITRATE 50 MCG: 50 INJECTION, SOLUTION INTRAMUSCULAR; INTRAVENOUS at 15:35

## 2023-07-24 RX ADMIN — PROPOFOL 50 MG: 10 INJECTION, EMULSION INTRAVENOUS at 15:35

## 2023-07-24 RX ADMIN — DEXAMETHASONE SODIUM PHOSPHATE 8 MG: 4 INJECTION, SOLUTION INTRAMUSCULAR; INTRAVENOUS at 13:50

## 2023-07-24 RX ADMIN — ROCURONIUM BROMIDE 20 MG: 10 INJECTION, SOLUTION INTRAVENOUS at 14:14

## 2023-07-24 RX ADMIN — FENTANYL CITRATE 100 MCG: 50 INJECTION, SOLUTION INTRAMUSCULAR; INTRAVENOUS at 13:50

## 2023-07-24 RX ADMIN — FENTANYL CITRATE 25 MCG: 0.05 INJECTION, SOLUTION INTRAMUSCULAR; INTRAVENOUS at 16:06

## 2023-07-24 RX ADMIN — ACETAMINOPHEN 1000 MG: 500 TABLET ORAL at 12:31

## 2023-07-24 RX ADMIN — GABAPENTIN 300 MG: 300 CAPSULE ORAL at 12:31

## 2023-07-24 RX ADMIN — ROCURONIUM BROMIDE 20 MG: 10 INJECTION, SOLUTION INTRAVENOUS at 15:14

## 2023-07-24 RX ADMIN — LIDOCAINE HYDROCHLORIDE 50 MG: 10 INJECTION, SOLUTION EPIDURAL; INFILTRATION; INTRACAUDAL; PERINEURAL at 13:52

## 2023-07-24 RX ADMIN — MIDAZOLAM 2 MG: 1 INJECTION INTRAMUSCULAR; INTRAVENOUS at 13:40

## 2023-07-24 RX ADMIN — DIPHENHYDRAMINE HYDROCHLORIDE 12.5 MG: 50 INJECTION, SOLUTION INTRAMUSCULAR; INTRAVENOUS at 15:47

## 2023-07-24 RX ADMIN — HYDROMORPHONE HYDROCHLORIDE 0.5 MG: 1 INJECTION, SOLUTION INTRAMUSCULAR; INTRAVENOUS; SUBCUTANEOUS at 15:51

## 2023-07-24 RX ADMIN — SODIUM CHLORIDE, POTASSIUM CHLORIDE, SODIUM LACTATE AND CALCIUM CHLORIDE: 600; 310; 30; 20 INJECTION, SOLUTION INTRAVENOUS at 12:54

## 2023-07-24 ASSESSMENT — PAIN DESCRIPTION - LOCATION
LOCATION: ABDOMEN

## 2023-07-24 ASSESSMENT — PAIN SCALES - GENERAL
PAINLEVEL_OUTOF10: 8

## 2023-07-24 ASSESSMENT — PAIN DESCRIPTION - PAIN TYPE
TYPE: SURGICAL PAIN
TYPE: SURGICAL PAIN

## 2023-07-24 ASSESSMENT — PAIN DESCRIPTION - DESCRIPTORS
DESCRIPTORS: SHARP;SHOOTING
DESCRIPTORS: SORE

## 2023-07-24 ASSESSMENT — PAIN - FUNCTIONAL ASSESSMENT
PAIN_FUNCTIONAL_ASSESSMENT: PREVENTS OR INTERFERES SOME ACTIVE ACTIVITIES AND ADLS
PAIN_FUNCTIONAL_ASSESSMENT: 0-10

## 2023-07-24 ASSESSMENT — PAIN DESCRIPTION - ORIENTATION: ORIENTATION: MID

## 2023-07-24 NOTE — OP NOTE
Wound was approximated using absorbable sutures in an interrupted fashion. Skin was approximated using staples. Sterile dressing was applied. Patient tolerated procedure well and was transferred to the recovery room in a stable condition. First assistant helped with positioning the patient, instrumentation and retraction and exposure. First assistant also help with wound closure. -  Recommendations: Discharge to home when criteria met. Discharge instructions in the chart. Prescriptions called in. Findings discussed with the family.

## 2023-07-24 NOTE — BRIEF OP NOTE
Brief Postoperative Note      Patient: Richard Parson  YOB: 1965  MRN: [id number]    Date of Procedure: 7/24/2023    Pre-Op Diagnosis Codes:      * Ventral hernia without obstruction or gangrene [K43.9]    Post-Op Diagnosis: Same       Procedure(s):  OPEN REPAIR PERIUMBILICAL HERNIA WITH MESH    Surgeon(s):  Darlene Chong MD    Assistant:  First Assistant: GILES Baca CNP    Anesthesia: General    Estimated Blood Loss (mL): less than 50     Complications: None    Specimens:   * No specimens in log *    Implants:  * No implants in log *      Drains: * No LDAs found *    Findings: Dictated      Electronically signed by Darlene Chong MD on 7/24/2023 at 1:57 PM

## 2023-07-24 NOTE — DISCHARGE INSTRUCTIONS
Dr. Paul Betancur Orders for Outpatient    1.) Diet:    [x]  As tolerated  []  Special     2.) Activity:    [x]  No lifting more than five to ten pounds 4-6 weeks  [x]  May Shower tomorrow  [x]  No driving until off pain medications     3.) Dressing:    [x]  Remove top dressing in 48 hours   [x]  Leave steri strips on     [x]  Remove and change dressing sooner if soaked    4.) Medication:    [x]  Take medication as prescribed   []  Resume blood thinners in  days    [x]  Resume home medications per AVS Summary    5.) Office visit: Follow up with Dr. Michelle Suggs. Call to schedule an appointment if one has not been made. 6.) Call [phone] if you have any questions or concerns. Electronically signed by Ene Nava MD on 7/24/2023 at 2:02 PM     [facility] [place]    In order to continue your care at home, please follow the instructions below. For General Anesthesia  Do not drink any alcoholic beverages or make any legal or important decisions for 24 hours. Diet    Drink plenty of fluids after surgery, unless you are on a fluid restriction. After general anesthesia, start out eating lightly (broth, soup, crackers, toast, etc.) advancing as tolerated to your usual diet. Try to avoid spicy or greasy/fatty foods for 24 hours. Avoid milk/milk product for several hours. You may notice that your bowel movements are not regular right after your surgery. This is common. Avoid constipation and straining with bowel movements. You may want to take a fiber supplement every day. If you have not had a bowel movement after a couple of days, ask your doctor about taking a mild laxative. Medications  Take medications as instructed by your surgeon. Please do not take prescribed pain medication with alcoholic beverages. When cleared to drive by your surgeon, please do not drive or operate machinery while taking any prescribed pain medication.     Activities  As instructed by your

## 2023-07-24 NOTE — PROGRESS NOTES
First Assist Operative Note      Patient: Bishop Rutherford  YOB: 1965  MRN: [id number]    Date of Procedure: 7/24/2023    Pre-Op Diagnosis Codes:  Chronically incarcerated periumbilical ventral hernia (3-10 cm)    Post-Op Diagnosis: Same       Procedure(s):  1) Open repair chronically incarcerated periumbilical ventral hernia with 11 cm circular synthetic mesh  2) Lysis of greater omental adhesions for 30 minutes. Surgeon(s):  Ladan Castro MD    Assistant: GILES Wise CNP was needed throughout the entirety of case for positioning, exposure, retraction and handling of the instruments and assistance in the procedure, as well as wound closure. Anesthesia: General    Detailed Description of Procedure: See Dr. Alondra Harper operative note from 7/24/2023 for further detail.     Electronically signed by GILES Wise CNP on 7/24/2023 at 4:39 PM

## 2023-07-28 DIAGNOSIS — K21.9 GASTROESOPHAGEAL REFLUX DISEASE, UNSPECIFIED WHETHER ESOPHAGITIS PRESENT: ICD-10-CM

## 2023-07-28 RX ORDER — MECOBALAMIN 5000 MCG
TABLET,DISINTEGRATING ORAL
Qty: 30 CAPSULE | Refills: 3 | Status: SHIPPED | OUTPATIENT
Start: 2023-07-28

## 2023-07-28 NOTE — TELEPHONE ENCOUNTER
Patient is calling about his refill request he asked for this morning. Patient states he is in a lot of pain and does not want to go through the weekend without it.     PCP out of office please advise

## 2024-06-07 DIAGNOSIS — I10 ESSENTIAL HYPERTENSION: ICD-10-CM

## 2024-06-07 RX ORDER — AMLODIPINE BESYLATE 10 MG/1
TABLET ORAL
Qty: 90 TABLET | Refills: 3 | Status: SHIPPED | OUTPATIENT
Start: 2024-06-07

## 2024-06-07 RX ORDER — METOPROLOL TARTRATE 50 MG/1
TABLET, FILM COATED ORAL
Qty: 180 TABLET | Refills: 3 | Status: SHIPPED | OUTPATIENT
Start: 2024-06-07

## 2024-06-07 NOTE — TELEPHONE ENCOUNTER
Patient calling to request refill for amLODIPine (NORVASC) 10 MG and metoprolol tartrate (LOPRESSOR) 50 MG . Please review and e-scribe if applicable.     Last Visit Date: 06/15/2023    Next Visit Date:  No future appointments.

## (undated) DEVICE — MERCY HEALTH ST CHARLES: Brand: MEDLINE INDUSTRIES, INC.

## (undated) DEVICE — GLOVE ORTHO 7 1/2   MSG9475

## (undated) DEVICE — SEALER ENDOSCP NANO COAT OPN DIV CRV L JAW LIGASURE IMPACT

## (undated) DEVICE — SHEET, T, LAPAROTOMY, STERILE: Brand: MEDLINE

## (undated) DEVICE — SUTURE ETHBND EXCEL SZ 0 L18IN NONABSORBABLE GRN L26MM MO-6 CX45D

## (undated) DEVICE — DRESSING HYDROCOLLOID BORDER 35X10 IN ALUM PRIMASEAL

## (undated) DEVICE — SINGLE PORT MANIFOLD: Brand: NEPTUNE 2

## (undated) DEVICE — SUTURE VCRL + SZ 3-0 L27IN ABSRB UD L26MM SH 1/2 CIR VCP416H

## (undated) DEVICE — COVER,MAYO STAND,STERILE: Brand: MEDLINE

## (undated) DEVICE — GOWN,AURORA,NONREINFORCED,LARGE: Brand: MEDLINE

## (undated) DEVICE — DRESSING TRNSPAR W5XL4.5IN FLM SHT SEMIPERMEABLE WIND

## (undated) DEVICE — HYPODERMIC SAFETY NEEDLE: Brand: MAGELLAN

## (undated) DEVICE — SYRINGE ONLY,20ML LUER LOCK: Brand: MEDLINE INDUSTRIES, INC.

## (undated) DEVICE — SUTURE VCRL + SZ 3-0 L18IN ABSRB VLT SH-1 L22MM 1/2 CIR VCP772D

## (undated) DEVICE — PAD,NON-ADHERENT,3X8,STERILE,LF,1/PK: Brand: MEDLINE

## (undated) DEVICE — ST CHARLES MINOR ABDOMINAL PK: Brand: MEDLINE INDUSTRIES, INC.

## (undated) DEVICE — SPONGE LAP W18XL18IN WHT COT 4 PLY FLD STRUNG RADPQ DISP ST 2 PER PACK

## (undated) DEVICE — SUTURE PERMAHAND SZ 0 L30IN NONABSORBABLE BLK FSL L30MM 3/8 680H

## (undated) DEVICE — SUTURE ETHBND EXCEL SZ 0 L18IN NONABSORBABLE GRN L36MM CT-1 CX21D

## (undated) DEVICE — FORCEPS BX L240CM JAW DIA22MM ORNG STD CAP W NDL RAD JAW 4

## (undated) DEVICE — SPONGE DRN W4XL4IN RAYON/POLYESTER 6 PLY NONWOVEN PRECUT 2 PER PK

## (undated) DEVICE — STRIP,CLOSURE,WOUND,MEDI-STRIP,1/2X4: Brand: MEDLINE

## (undated) DEVICE — KIT DRN FLAT W/ 100CC EVAC 10MM FULL PERF

## (undated) DEVICE — Device

## (undated) DEVICE — LIQUIBAND RAPID ADHESIVE 36/CS 0.8ML: Brand: MEDLINE